# Patient Record
Sex: MALE | Race: BLACK OR AFRICAN AMERICAN | Employment: FULL TIME | URBAN - METROPOLITAN AREA
[De-identification: names, ages, dates, MRNs, and addresses within clinical notes are randomized per-mention and may not be internally consistent; named-entity substitution may affect disease eponyms.]

---

## 2018-04-30 ENCOUNTER — HOSPITAL ENCOUNTER (EMERGENCY)
Age: 33
Discharge: HOME | End: 2018-04-30
Attending: EMERGENCY MEDICINE

## 2018-04-30 VITALS
HEIGHT: 71 IN | TEMPERATURE: 98.7 F | HEART RATE: 88 BPM | SYSTOLIC BLOOD PRESSURE: 129 MMHG | DIASTOLIC BLOOD PRESSURE: 61 MMHG | OXYGEN SATURATION: 98 % | BODY MASS INDEX: 20.3 KG/M2 | WEIGHT: 145 LBS | RESPIRATION RATE: 16 BRPM

## 2018-04-30 DIAGNOSIS — S39.012A STRAIN OF LUMBAR REGION, INITIAL ENCOUNTER: Primary | ICD-10-CM

## 2018-04-30 LAB
BILIRUBIN URINE: NEGATIVE
BLOOD, URINE: NEGATIVE
CLARITY: CLEAR
COLOR: YELLOW
GLUCOSE URINE: NEGATIVE MG/DL
KETONES, URINE: NEGATIVE MG/DL
LEUKOCYTE ESTERASE, URINE: NEGATIVE
NITRITE, URINE: NEGATIVE
PH UA: 6 (ref 5–9)
PROTEIN UA: NEGATIVE MG/DL
SPECIFIC GRAVITY UA: >=1.03 (ref 1–1.03)
UROBILINOGEN, URINE: 0.2 E.U./DL

## 2018-04-30 PROCEDURE — 96372 THER/PROPH/DIAG INJ SC/IM: CPT

## 2018-04-30 PROCEDURE — 81003 URINALYSIS AUTO W/O SCOPE: CPT

## 2018-04-30 PROCEDURE — 6370000000 HC RX 637 (ALT 250 FOR IP): Performed by: EMERGENCY MEDICINE

## 2018-04-30 PROCEDURE — 6360000002 HC RX W HCPCS: Performed by: EMERGENCY MEDICINE

## 2018-04-30 PROCEDURE — 99284 EMERGENCY DEPT VISIT MOD MDM: CPT

## 2018-04-30 RX ORDER — DEXAMETHASONE SODIUM PHOSPHATE 10 MG/ML
10 INJECTION INTRAMUSCULAR; INTRAVENOUS ONCE
Status: COMPLETED | OUTPATIENT
Start: 2018-04-30 | End: 2018-04-30

## 2018-04-30 RX ORDER — CYCLOBENZAPRINE HCL 10 MG
10 TABLET ORAL 2 TIMES DAILY PRN
Qty: 20 TABLET | Refills: 0 | Status: SHIPPED | OUTPATIENT
Start: 2018-04-30 | End: 2022-08-17 | Stop reason: ALTCHOICE

## 2018-04-30 RX ORDER — CYCLOBENZAPRINE HCL 10 MG
10 TABLET ORAL ONCE
Status: COMPLETED | OUTPATIENT
Start: 2018-04-30 | End: 2018-04-30

## 2018-04-30 RX ORDER — KETOROLAC TROMETHAMINE 30 MG/ML
30 INJECTION, SOLUTION INTRAMUSCULAR; INTRAVENOUS ONCE
Status: COMPLETED | OUTPATIENT
Start: 2018-04-30 | End: 2018-04-30

## 2018-04-30 RX ORDER — PREDNISONE 10 MG/1
40 TABLET ORAL DAILY
Qty: 16 TABLET | Refills: 0 | Status: SHIPPED | OUTPATIENT
Start: 2018-04-30 | End: 2018-05-04

## 2018-04-30 RX ADMIN — CYCLOBENZAPRINE HYDROCHLORIDE 10 MG: 10 TABLET, FILM COATED ORAL at 21:37

## 2018-04-30 RX ADMIN — KETOROLAC TROMETHAMINE 30 MG: 30 INJECTION, SOLUTION INTRAMUSCULAR at 21:36

## 2018-04-30 RX ADMIN — DEXAMETHASONE SODIUM PHOSPHATE 10 MG: 10 INJECTION INTRAMUSCULAR; INTRAVENOUS at 21:36

## 2018-04-30 ASSESSMENT — ENCOUNTER SYMPTOMS
NAUSEA: 0
EYE DISCHARGE: 0
DIARRHEA: 0
VOMITING: 0
BACK PAIN: 1
WHEEZING: 0
SHORTNESS OF BREATH: 0
SORE THROAT: 0
SINUS PRESSURE: 0
COUGH: 0
ABDOMINAL PAIN: 0
EYE REDNESS: 0
EYE PAIN: 0

## 2018-04-30 ASSESSMENT — PAIN SCALES - GENERAL
PAINLEVEL_OUTOF10: 7
PAINLEVEL_OUTOF10: 7

## 2018-04-30 ASSESSMENT — PAIN DESCRIPTION - LOCATION: LOCATION: FLANK

## 2018-04-30 ASSESSMENT — PAIN DESCRIPTION - PAIN TYPE: TYPE: ACUTE PAIN

## 2019-09-12 ENCOUNTER — HOSPITAL ENCOUNTER (EMERGENCY)
Age: 34
Discharge: HOME | End: 2019-09-12

## 2019-09-12 VITALS
OXYGEN SATURATION: 95 % | SYSTOLIC BLOOD PRESSURE: 114 MMHG | RESPIRATION RATE: 18 BRPM | TEMPERATURE: 97.9 F | HEART RATE: 61 BPM | DIASTOLIC BLOOD PRESSURE: 69 MMHG

## 2019-09-12 DIAGNOSIS — K08.89 PAIN, DENTAL: Primary | ICD-10-CM

## 2019-09-12 DIAGNOSIS — K04.7 DENTAL INFECTION: ICD-10-CM

## 2019-09-12 PROCEDURE — 6370000000 HC RX 637 (ALT 250 FOR IP): Performed by: NURSE PRACTITIONER

## 2019-09-12 PROCEDURE — 99282 EMERGENCY DEPT VISIT SF MDM: CPT

## 2019-09-12 RX ORDER — AMOXICILLIN 250 MG/1
500 CAPSULE ORAL ONCE
Status: COMPLETED | OUTPATIENT
Start: 2019-09-12 | End: 2019-09-12

## 2019-09-12 RX ORDER — ACETAMINOPHEN 500 MG
1000 TABLET ORAL 4 TIMES DAILY PRN
Qty: 40 TABLET | Refills: 0 | Status: SHIPPED | OUTPATIENT
Start: 2019-09-12 | End: 2022-08-17 | Stop reason: ALTCHOICE

## 2019-09-12 RX ORDER — IBUPROFEN 800 MG/1
800 TABLET ORAL EVERY 6 HOURS PRN
Qty: 40 TABLET | Refills: 0 | Status: SHIPPED | OUTPATIENT
Start: 2019-09-12 | End: 2022-08-17 | Stop reason: ALTCHOICE

## 2019-09-12 RX ORDER — ACETAMINOPHEN 500 MG
1000 TABLET ORAL ONCE
Status: COMPLETED | OUTPATIENT
Start: 2019-09-12 | End: 2019-09-12

## 2019-09-12 RX ORDER — IBUPROFEN 800 MG/1
800 TABLET ORAL ONCE
Status: COMPLETED | OUTPATIENT
Start: 2019-09-12 | End: 2019-09-12

## 2019-09-12 RX ORDER — AMOXICILLIN 500 MG/1
500 CAPSULE ORAL 3 TIMES DAILY
Qty: 30 CAPSULE | Refills: 0 | Status: SHIPPED | OUTPATIENT
Start: 2019-09-12 | End: 2019-09-22

## 2019-09-12 RX ADMIN — AMOXICILLIN 500 MG: 250 CAPSULE ORAL at 21:57

## 2019-09-12 RX ADMIN — IBUPROFEN 800 MG: 800 TABLET, FILM COATED ORAL at 21:57

## 2019-09-12 RX ADMIN — ACETAMINOPHEN 1000 MG: 500 TABLET ORAL at 21:57

## 2019-09-12 ASSESSMENT — PAIN DESCRIPTION - LOCATION: LOCATION: TEETH

## 2019-09-12 ASSESSMENT — PAIN DESCRIPTION - PAIN TYPE: TYPE: ACUTE PAIN

## 2019-09-12 ASSESSMENT — PAIN SCALES - GENERAL: PAINLEVEL_OUTOF10: 10

## 2019-09-12 NOTE — LETTER
[facility] Emergency Department  [address]  [place] [address]  Phone: [phone]             September 12, 2019    Patient: Susi Cazares   YOB: 1985   Date of Visit: 9/12/2019       To Whom It May Concern:    Susi Cazares was seen and treated in our emergency department on 9/12/2019.  He may return to Work/School on the following date 9/14/2019      Sincerely,       Merle Wellington RN         Signature:__________________________________

## 2019-09-13 NOTE — ED PROVIDER NOTES
Clear to auscultation and breath sounds equal.    · CV: Regular rate and rhythm, normal heart sounds, without pathological murmurs, ectopy, gallops, or rubs. · Skin:  No rashes, erythema or lesions present, unless noted elsewhere. .  · Lymphatics: No lymphangitis or adenopathy noted. · Neurological:  Oriented. Motor functions intact. Lab / Imaging Results   (All laboratory and radiology results have been personally reviewed by myself)  Labs:  No results found for this visit on 09/12/19. Imaging: All Radiology results interpreted by Radiologist unless otherwise noted. No orders to display     ED Course / Medical Decision Making     Medications   ibuprofen (ADVIL;MOTRIN) tablet 800 mg (800 mg Oral Given 9/12/19 2157)   acetaminophen (TYLENOL) tablet 1,000 mg (1,000 mg Oral Given 9/12/19 2157)   amoxicillin (AMOXIL) capsule 500 mg (500 mg Oral Given 9/12/19 2157)        Consult(s):   Dental Resident was not consulted to see patient regarding complaint. Procedure(s):    none. Counseling/MDM:    The emergency provider has spoken with the patient and discussed todays results, in addition to providing specific details for the plan of care and counseling regarding the diagnosis and prognosis. Questions are answered at this time and they are agreeable with the plan. Assessment      1. Pain, dental    2.  Dental infection      Plan   Discharge to home  Patient condition is stable    New Medications     Discharge Medication List as of 9/12/2019  9:23 PM      START taking these medications    Details   acetaminophen (TYLENOL) 500 MG tablet Take 2 tablets by mouth 4 times daily as needed for Pain, Disp-40 tablet, R-0Print      ibuprofen (ADVIL;MOTRIN) 800 MG tablet Take 1 tablet by mouth every 6 hours as needed for Pain, Disp-40 tablet, R-0Print      Magic Mouthwash (MIRACLE MOUTHWASH) Swish and spit 5 mLs 4 times daily as needed for Irritation, Disp-240 mL, R-0Benadryl/nystatin/lidocaine  1:1:1Print amoxicillin (AMOXIL) 500 MG capsule Take 1 capsule by mouth 3 times daily for 10 days, Disp-30 capsule, R-0Print           Electronically signed by KARY Shetty CNP   DD: 9/12/19  **This report was transcribed using voice recognition software. Every effort was made to ensure accuracy; however, inadvertent computerized transcription errors may be present.   END OF ED PROVIDER NOTE     KARY Shetty CNP  09/13/19 0321

## 2022-08-17 ENCOUNTER — APPOINTMENT (OUTPATIENT)
Dept: CT IMAGING | Age: 37
DRG: 351 | End: 2022-08-17
Payer: MEDICAID

## 2022-08-17 ENCOUNTER — APPOINTMENT (OUTPATIENT)
Dept: GENERAL RADIOLOGY | Age: 37
DRG: 351 | End: 2022-08-17
Payer: MEDICAID

## 2022-08-17 ENCOUNTER — HOSPITAL ENCOUNTER (INPATIENT)
Age: 37
LOS: 16 days | Discharge: HOME | DRG: 351 | End: 2022-09-02
Attending: EMERGENCY MEDICINE | Admitting: STUDENT IN AN ORGANIZED HEALTH CARE EDUCATION/TRAINING PROGRAM
Payer: MEDICAID

## 2022-08-17 ENCOUNTER — APPOINTMENT (OUTPATIENT)
Dept: ULTRASOUND IMAGING | Age: 37
DRG: 351 | End: 2022-08-17
Payer: MEDICAID

## 2022-08-17 DIAGNOSIS — T80.1XXA IV INFILTRATE, INITIAL ENCOUNTER: ICD-10-CM

## 2022-08-17 DIAGNOSIS — E87.5 HYPERKALEMIA: ICD-10-CM

## 2022-08-17 DIAGNOSIS — N17.9 ACUTE KIDNEY INJURY (HCC): Primary | ICD-10-CM

## 2022-08-17 DIAGNOSIS — Z49.01 ENCOUNTER FOR DIALYSIS CATHETER CARE (HCC): ICD-10-CM

## 2022-08-17 DIAGNOSIS — D72.829 LEUKOCYTOSIS, UNSPECIFIED TYPE: ICD-10-CM
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
## 2022-08-17 DIAGNOSIS — T79.6XXA TRAUMATIC RHABDOMYOLYSIS, INITIAL ENCOUNTER (HCC): ICD-10-CM

## 2022-08-17 DIAGNOSIS — K72.00 SHOCK LIVER: ICD-10-CM

## 2022-08-17 LAB
ACETAMINOPHEN LEVEL: <5 MCG/ML (ref 10–30)
ACETAMINOPHEN LEVEL: <5 MCG/ML (ref 10–30)
ALBUMIN SERPL-MCNC: 3.1 G/DL (ref 3.5–5.2)
ALBUMIN SERPL-MCNC: 3.9 G/DL (ref 3.5–5.2)
ALP BLD-CCNC: 76 U/L (ref 40–129)
ALP BLD-CCNC: 90 U/L (ref 40–129)
ALT SERPL-CCNC: 1005 U/L (ref 0–40)
ALT SERPL-CCNC: 903 U/L (ref 0–40)
AMPHETAMINE SCREEN, URINE: NOT DETECTED
ANION GAP SERPL CALCULATED.3IONS-SCNC: 14 MMOL/L (ref 7–16)
ANION GAP SERPL CALCULATED.3IONS-SCNC: 16 MMOL/L (ref 7–16)
ANION GAP SERPL CALCULATED.3IONS-SCNC: 19 MMOL/L (ref 7–16)
AST SERPL-CCNC: 3096 U/L (ref 0–39)
AST SERPL-CCNC: 3665 U/L (ref 0–39)
BACTERIA: ABNORMAL /HPF
BARBITURATE SCREEN URINE: NOT DETECTED
BASOPHILS ABSOLUTE: 0.03 E9/L (ref 0–0.2)
BASOPHILS RELATIVE PERCENT: 0.2 % (ref 0–2)
BENZODIAZEPINE SCREEN, URINE: NOT DETECTED
BILIRUB SERPL-MCNC: 0.4 MG/DL (ref 0–1.2)
BILIRUB SERPL-MCNC: 0.4 MG/DL (ref 0–1.2)
BILIRUBIN DIRECT: <0.2 MG/DL (ref 0–0.3)
BILIRUBIN URINE: ABNORMAL
BILIRUBIN, INDIRECT: ABNORMAL MG/DL (ref 0–1)
BLOOD, URINE: ABNORMAL
BUN BLDV-MCNC: 32 MG/DL (ref 6–20)
BUN BLDV-MCNC: 35 MG/DL (ref 6–20)
BUN BLDV-MCNC: 40 MG/DL (ref 6–20)
CALCIUM SERPL-MCNC: 6.8 MG/DL (ref 8.6–10.2)
CALCIUM SERPL-MCNC: 6.8 MG/DL (ref 8.6–10.2)
CALCIUM SERPL-MCNC: 7.3 MG/DL (ref 8.6–10.2)
CANNABINOID SCREEN URINE: NOT DETECTED
CHLORIDE BLD-SCNC: 90 MMOL/L (ref 98–107)
CHLORIDE BLD-SCNC: 94 MMOL/L (ref 98–107)
CHLORIDE BLD-SCNC: 97 MMOL/L (ref 98–107)
CHLORIDE URINE RANDOM: 53 MMOL/L
CHP ED QC CHECK: NORMAL
CLARITY: ABNORMAL
CO2: 18 MMOL/L (ref 22–29)
CO2: 20 MMOL/L (ref 22–29)
CO2: 26 MMOL/L (ref 22–29)
COCAINE METABOLITE SCREEN URINE: POSITIVE
COLOR: ABNORMAL
CREAT SERPL-MCNC: 4.3 MG/DL (ref 0.7–1.2)
CREAT SERPL-MCNC: 4.4 MG/DL (ref 0.7–1.2)
CREAT SERPL-MCNC: 5 MG/DL (ref 0.7–1.2)
CREATININE URINE: 172 MG/DL (ref 40–278)
EKG ATRIAL RATE: 82 BPM
EKG P AXIS: 77 DEGREES
EKG P-R INTERVAL: 136 MS
EKG Q-T INTERVAL: 348 MS
EKG QRS DURATION: 88 MS
EKG QTC CALCULATION (BAZETT): 406 MS
EKG R AXIS: 54 DEGREES
EKG T AXIS: 52 DEGREES
EKG VENTRICULAR RATE: 82 BPM
EOSINOPHILS ABSOLUTE: 0.02 E9/L (ref 0.05–0.5)
EOSINOPHILS RELATIVE PERCENT: 0.1 % (ref 0–6)
ETHANOL: <10 MG/DL (ref 0–0.08)
FENTANYL SCREEN, URINE: POSITIVE
GFR AFRICAN AMERICAN: 16
GFR AFRICAN AMERICAN: 18
GFR AFRICAN AMERICAN: 19
GFR NON-AFRICAN AMERICAN: 16 ML/MIN/1.73
GFR NON-AFRICAN AMERICAN: 18 ML/MIN/1.73
GFR NON-AFRICAN AMERICAN: 19 ML/MIN/1.73
GLUCOSE BLD-MCNC: 124 MG/DL
GLUCOSE BLD-MCNC: 128 MG/DL (ref 74–99)
GLUCOSE BLD-MCNC: 146 MG/DL (ref 74–99)
GLUCOSE BLD-MCNC: 172 MG/DL
GLUCOSE BLD-MCNC: 180 MG/DL
GLUCOSE BLD-MCNC: 183 MG/DL (ref 74–99)
GLUCOSE BLD-MCNC: 187 MG/DL
GLUCOSE BLD-MCNC: 94 MG/DL
GLUCOSE URINE: 100 MG/DL
HCT VFR BLD CALC: 59.5 % (ref 37–54)
HEMOGLOBIN: 19.8 G/DL (ref 12.5–16.5)
IMMATURE GRANULOCYTES #: 0.1 E9/L
IMMATURE GRANULOCYTES %: 0.6 % (ref 0–5)
KETONES, URINE: ABNORMAL MG/DL
LACTIC ACID: 3.9 MMOL/L (ref 0.5–2.2)
LEUKOCYTE ESTERASE, URINE: ABNORMAL
LYMPHOCYTES ABSOLUTE: 1.39 E9/L (ref 1.5–4)
LYMPHOCYTES RELATIVE PERCENT: 8.2 % (ref 20–42)
Lab: ABNORMAL
MAGNESIUM: 2.3 MG/DL (ref 1.6–2.6)
MCH RBC QN AUTO: 29.8 PG (ref 26–35)
MCHC RBC AUTO-ENTMCNC: 33.3 % (ref 32–34.5)
MCV RBC AUTO: 89.5 FL (ref 80–99.9)
METER GLUCOSE: 124 MG/DL (ref 74–99)
METER GLUCOSE: 142 MG/DL (ref 74–99)
METER GLUCOSE: 172 MG/DL (ref 74–99)
METER GLUCOSE: 176 MG/DL (ref 74–99)
METER GLUCOSE: 180 MG/DL (ref 74–99)
METER GLUCOSE: 187 MG/DL (ref 74–99)
METER GLUCOSE: 199 MG/DL (ref 74–99)
METER GLUCOSE: 430 MG/DL (ref 74–99)
METER GLUCOSE: 94 MG/DL (ref 74–99)
METHADONE SCREEN, URINE: NOT DETECTED
MONOCYTES ABSOLUTE: 1.31 E9/L (ref 0.1–0.95)
MONOCYTES RELATIVE PERCENT: 7.7 % (ref 2–12)
NEUTROPHILS ABSOLUTE: 14.11 E9/L (ref 1.8–7.3)
NEUTROPHILS RELATIVE PERCENT: 83.2 % (ref 43–80)
NITRITE, URINE: POSITIVE
OPIATE SCREEN URINE: NOT DETECTED
OSMOLALITY URINE: 406 MOSM/KG (ref 300–900)
OXYCODONE URINE: NOT DETECTED
PDW BLD-RTO: 16 FL (ref 11.5–15)
PH UA: 6.5 (ref 5–9)
PHENCYCLIDINE SCREEN URINE: NOT DETECTED
PLATELET # BLD: 423 E9/L (ref 130–450)
PMV BLD AUTO: 9.4 FL (ref 7–12)
POTASSIUM REFLEX MAGNESIUM: 6.5 MMOL/L (ref 3.5–5)
POTASSIUM REFLEX MAGNESIUM: 8.3 MMOL/L (ref 3.5–5)
POTASSIUM SERPL-SCNC: 5.2 MMOL/L (ref 3.5–5)
POTASSIUM SERPL-SCNC: 5.6 MMOL/L (ref 3.5–5)
POTASSIUM, UR: 61.6 MMOL/L
PROTEIN PROTEIN: 346 MG/DL (ref 0–12)
PROTEIN UA: >=300 MG/DL
PROTEIN/CREAT RATIO: 2
PROTEIN/CREAT RATIO: 2 (ref 0–0.2)
RBC # BLD: 6.65 E12/L (ref 3.8–5.8)
RBC UA: ABNORMAL /HPF (ref 0–2)
SALICYLATE, SERUM: 0.5 MG/DL (ref 0–30)
SODIUM BLD-SCNC: 130 MMOL/L (ref 132–146)
SODIUM BLD-SCNC: 131 MMOL/L (ref 132–146)
SODIUM BLD-SCNC: 133 MMOL/L (ref 132–146)
SODIUM URINE: 82 MMOL/L
SPECIFIC GRAVITY UA: 1.02 (ref 1–1.03)
TOTAL PROTEIN: 6.3 G/DL (ref 6.4–8.3)
TOTAL PROTEIN: 7.8 G/DL (ref 6.4–8.3)
TRICYCLIC ANTIDEPRESSANTS SCREEN SERUM: NEGATIVE NG/ML
UROBILINOGEN, URINE: 2 E.U./DL
WBC # BLD: 17 E9/L (ref 4.5–11.5)
WBC UA: ABNORMAL /HPF (ref 0–5)

## 2022-08-17 PROCEDURE — 96374 THER/PROPH/DIAG INJ IV PUSH: CPT

## 2022-08-17 PROCEDURE — 2580000003 HC RX 258: Performed by: NURSE PRACTITIONER

## 2022-08-17 PROCEDURE — 93005 ELECTROCARDIOGRAM TRACING: CPT

## 2022-08-17 PROCEDURE — 6370000000 HC RX 637 (ALT 250 FOR IP): Performed by: EMERGENCY MEDICINE

## 2022-08-17 PROCEDURE — 84133 ASSAY OF URINE POTASSIUM: CPT

## 2022-08-17 PROCEDURE — 2580000003 HC RX 258: Performed by: EMERGENCY MEDICINE

## 2022-08-17 PROCEDURE — 73060 X-RAY EXAM OF HUMERUS: CPT

## 2022-08-17 PROCEDURE — 6360000002 HC RX W HCPCS: Performed by: INTERNAL MEDICINE

## 2022-08-17 PROCEDURE — 36415 COLL VENOUS BLD VENIPUNCTURE: CPT

## 2022-08-17 PROCEDURE — 99285 EMERGENCY DEPT VISIT HI MDM: CPT

## 2022-08-17 PROCEDURE — 6360000002 HC RX W HCPCS: Performed by: STUDENT IN AN ORGANIZED HEALTH CARE EDUCATION/TRAINING PROGRAM

## 2022-08-17 PROCEDURE — 94640 AIRWAY INHALATION TREATMENT: CPT

## 2022-08-17 PROCEDURE — 84300 ASSAY OF URINE SODIUM: CPT

## 2022-08-17 PROCEDURE — 85025 COMPLETE CBC W/AUTO DIFF WBC: CPT

## 2022-08-17 PROCEDURE — 2500000003 HC RX 250 WO HCPCS: Performed by: INTERNAL MEDICINE

## 2022-08-17 PROCEDURE — 6360000002 HC RX W HCPCS

## 2022-08-17 PROCEDURE — 80179 DRUG ASSAY SALICYLATE: CPT

## 2022-08-17 PROCEDURE — 80053 COMPREHEN METABOLIC PANEL: CPT

## 2022-08-17 PROCEDURE — 2580000003 HC RX 258

## 2022-08-17 PROCEDURE — 96361 HYDRATE IV INFUSION ADD-ON: CPT

## 2022-08-17 PROCEDURE — 76705 ECHO EXAM OF ABDOMEN: CPT

## 2022-08-17 PROCEDURE — 76770 US EXAM ABDO BACK WALL COMP: CPT

## 2022-08-17 PROCEDURE — 82436 ASSAY OF URINE CHLORIDE: CPT

## 2022-08-17 PROCEDURE — 83735 ASSAY OF MAGNESIUM: CPT

## 2022-08-17 PROCEDURE — 2500000003 HC RX 250 WO HCPCS: Performed by: EMERGENCY MEDICINE

## 2022-08-17 PROCEDURE — 2580000003 HC RX 258: Performed by: INTERNAL MEDICINE

## 2022-08-17 PROCEDURE — 83935 ASSAY OF URINE OSMOLALITY: CPT

## 2022-08-17 PROCEDURE — 2060000000 HC ICU INTERMEDIATE R&B

## 2022-08-17 PROCEDURE — 99254 IP/OBS CNSLTJ NEW/EST MOD 60: CPT | Performed by: ORTHOPAEDIC SURGERY

## 2022-08-17 PROCEDURE — 6370000000 HC RX 637 (ALT 250 FOR IP): Performed by: INTERNAL MEDICINE

## 2022-08-17 PROCEDURE — 96375 TX/PRO/DX INJ NEW DRUG ADDON: CPT

## 2022-08-17 PROCEDURE — 80143 DRUG ASSAY ACETAMINOPHEN: CPT

## 2022-08-17 PROCEDURE — 72125 CT NECK SPINE W/O DYE: CPT

## 2022-08-17 PROCEDURE — 80307 DRUG TEST PRSMV CHEM ANLYZR: CPT

## 2022-08-17 PROCEDURE — 73080 X-RAY EXAM OF ELBOW: CPT

## 2022-08-17 PROCEDURE — 84156 ASSAY OF PROTEIN URINE: CPT

## 2022-08-17 PROCEDURE — 6370000000 HC RX 637 (ALT 250 FOR IP): Performed by: NURSE PRACTITIONER

## 2022-08-17 PROCEDURE — 73090 X-RAY EXAM OF FOREARM: CPT

## 2022-08-17 PROCEDURE — 81001 URINALYSIS AUTO W/SCOPE: CPT

## 2022-08-17 PROCEDURE — 82077 ASSAY SPEC XCP UR&BREATH IA: CPT

## 2022-08-17 PROCEDURE — 82962 GLUCOSE BLOOD TEST: CPT

## 2022-08-17 PROCEDURE — 82550 ASSAY OF CK (CPK): CPT

## 2022-08-17 PROCEDURE — 99223 1ST HOSP IP/OBS HIGH 75: CPT | Performed by: STUDENT IN AN ORGANIZED HEALTH CARE EDUCATION/TRAINING PROGRAM

## 2022-08-17 PROCEDURE — 6370000000 HC RX 637 (ALT 250 FOR IP)

## 2022-08-17 PROCEDURE — 83605 ASSAY OF LACTIC ACID: CPT

## 2022-08-17 PROCEDURE — 82570 ASSAY OF URINE CREATININE: CPT

## 2022-08-17 PROCEDURE — 70450 CT HEAD/BRAIN W/O DYE: CPT

## 2022-08-17 PROCEDURE — 80048 BASIC METABOLIC PNL TOTAL CA: CPT

## 2022-08-17 PROCEDURE — 80076 HEPATIC FUNCTION PANEL: CPT

## 2022-08-17 PROCEDURE — 94664 DEMO&/EVAL PT USE INHALER: CPT

## 2022-08-17 PROCEDURE — 72170 X-RAY EXAM OF PELVIS: CPT

## 2022-08-17 PROCEDURE — 73030 X-RAY EXAM OF SHOULDER: CPT

## 2022-08-17 PROCEDURE — 84132 ASSAY OF SERUM POTASSIUM: CPT

## 2022-08-17 RX ORDER — DEXTROSE MONOHYDRATE 25 G/50ML
25 INJECTION, SOLUTION INTRAVENOUS ONCE
Status: COMPLETED | OUTPATIENT
Start: 2022-08-17 | End: 2022-08-17

## 2022-08-17 RX ORDER — POLYETHYLENE GLYCOL 3350 17 G/17G
17 POWDER, FOR SOLUTION ORAL DAILY PRN
Status: DISCONTINUED | OUTPATIENT
Start: 2022-08-17 | End: 2022-09-02 | Stop reason: HOSPADM

## 2022-08-17 RX ORDER — ACETAMINOPHEN 650 MG/1
650 SUPPOSITORY RECTAL EVERY 6 HOURS PRN
Status: CANCELLED | OUTPATIENT
Start: 2022-08-17

## 2022-08-17 RX ORDER — ENOXAPARIN SODIUM 100 MG/ML
30 INJECTION SUBCUTANEOUS DAILY
Status: CANCELLED | OUTPATIENT
Start: 2022-08-17

## 2022-08-17 RX ORDER — MORPHINE SULFATE 2 MG/ML
1 INJECTION, SOLUTION INTRAMUSCULAR; INTRAVENOUS EVERY 6 HOURS PRN
Status: DISCONTINUED | OUTPATIENT
Start: 2022-08-17 | End: 2022-08-18

## 2022-08-17 RX ORDER — ACETAMINOPHEN 325 MG/1
650 TABLET ORAL EVERY 6 HOURS PRN
Status: CANCELLED | OUTPATIENT
Start: 2022-08-17

## 2022-08-17 RX ORDER — SODIUM CHLORIDE 0.9 % (FLUSH) 0.9 %
5-40 SYRINGE (ML) INJECTION EVERY 12 HOURS SCHEDULED
Status: DISCONTINUED | OUTPATIENT
Start: 2022-08-17 | End: 2022-09-02 | Stop reason: HOSPADM

## 2022-08-17 RX ORDER — SODIUM CHLORIDE 9 MG/ML
1000 INJECTION, SOLUTION INTRAVENOUS CONTINUOUS
Status: DISCONTINUED | OUTPATIENT
Start: 2022-08-17 | End: 2022-08-17

## 2022-08-17 RX ORDER — DEXTROSE MONOHYDRATE 100 MG/ML
INJECTION, SOLUTION INTRAVENOUS CONTINUOUS PRN
Status: DISCONTINUED | OUTPATIENT
Start: 2022-08-17 | End: 2022-09-02 | Stop reason: HOSPADM

## 2022-08-17 RX ORDER — 0.9 % SODIUM CHLORIDE 0.9 %
1000 INTRAVENOUS SOLUTION INTRAVENOUS ONCE
Status: COMPLETED | OUTPATIENT
Start: 2022-08-17 | End: 2022-08-17

## 2022-08-17 RX ORDER — SODIUM CHLORIDE 9 MG/ML
INJECTION, SOLUTION INTRAVENOUS CONTINUOUS
Status: DISCONTINUED | OUTPATIENT
Start: 2022-08-17 | End: 2022-08-26

## 2022-08-17 RX ORDER — SODIUM CHLORIDE 0.9 % (FLUSH) 0.9 %
5-40 SYRINGE (ML) INJECTION PRN
Status: DISCONTINUED | OUTPATIENT
Start: 2022-08-17 | End: 2022-09-02 | Stop reason: HOSPADM

## 2022-08-17 RX ORDER — SODIUM CHLORIDE 9 MG/ML
INJECTION, SOLUTION INTRAVENOUS PRN
Status: DISCONTINUED | OUTPATIENT
Start: 2022-08-17 | End: 2022-09-02 | Stop reason: HOSPADM

## 2022-08-17 RX ORDER — CALCIUM GLUCONATE 94 MG/ML
1000 INJECTION, SOLUTION INTRAVENOUS ONCE
Status: COMPLETED | OUTPATIENT
Start: 2022-08-17 | End: 2022-08-17

## 2022-08-17 RX ADMIN — DEXTROSE MONOHYDRATE 25 G: 25 INJECTION, SOLUTION INTRAVENOUS at 18:40

## 2022-08-17 RX ADMIN — SODIUM BICARBONATE 50 MEQ: 84 INJECTION, SOLUTION INTRAVENOUS at 18:39

## 2022-08-17 RX ADMIN — CALCIUM GLUCONATE 1000 MG: 98 INJECTION, SOLUTION INTRAVENOUS at 11:46

## 2022-08-17 RX ADMIN — SODIUM ZIRCONIUM CYCLOSILICATE 10 G: 10 POWDER, FOR SUSPENSION ORAL at 20:00

## 2022-08-17 RX ADMIN — INSULIN HUMAN 10 UNITS: 100 INJECTION, SOLUTION PARENTERAL at 18:45

## 2022-08-17 RX ADMIN — SODIUM BICARBONATE: 84 INJECTION, SOLUTION INTRAVENOUS at 16:47

## 2022-08-17 RX ADMIN — SODIUM CHLORIDE 1000 ML: 9 INJECTION, SOLUTION INTRAVENOUS at 11:08

## 2022-08-17 RX ADMIN — Medication 10 ML: at 19:59

## 2022-08-17 RX ADMIN — DEXTROSE MONOHYDRATE 250 ML: 100 INJECTION, SOLUTION INTRAVENOUS at 11:45

## 2022-08-17 RX ADMIN — SODIUM ZIRCONIUM CYCLOSILICATE 10 G: 10 POWDER, FOR SUSPENSION ORAL at 13:00

## 2022-08-17 RX ADMIN — INSULIN HUMAN 10 UNITS: 100 INJECTION, SOLUTION PARENTERAL at 11:49

## 2022-08-17 RX ADMIN — ALBUTEROL SULFATE 10 MG: 2.5 SOLUTION RESPIRATORY (INHALATION) at 12:34

## 2022-08-17 RX ADMIN — CALCIUM GLUCONATE 1000 MG: 98 INJECTION, SOLUTION INTRAVENOUS at 18:51

## 2022-08-17 RX ADMIN — SODIUM CHLORIDE: 9 INJECTION, SOLUTION INTRAVENOUS at 22:04

## 2022-08-17 RX ADMIN — SODIUM BICARBONATE: 84 INJECTION, SOLUTION INTRAVENOUS at 12:16

## 2022-08-17 RX ADMIN — MORPHINE SULFATE 1 MG: 2 INJECTION, SOLUTION INTRAMUSCULAR; INTRAVENOUS at 20:10

## 2022-08-17 ASSESSMENT — ENCOUNTER SYMPTOMS
WHEEZING: 0
SHORTNESS OF BREATH: 0
BLOOD IN STOOL: 0
EYE DISCHARGE: 0
COUGH: 0
ABDOMINAL PAIN: 0
EYE REDNESS: 0
PHOTOPHOBIA: 0
DIARRHEA: 0
CONSTIPATION: 0
ABDOMINAL DISTENTION: 0
SORE THROAT: 0
SINUS PRESSURE: 0
BACK PAIN: 0
TROUBLE SWALLOWING: 0
VOMITING: 0
NAUSEA: 0

## 2022-08-17 ASSESSMENT — PAIN DESCRIPTION - ORIENTATION
ORIENTATION: LEFT
ORIENTATION: RIGHT
ORIENTATION: RIGHT

## 2022-08-17 ASSESSMENT — LIFESTYLE VARIABLES
HOW MANY STANDARD DRINKS CONTAINING ALCOHOL DO YOU HAVE ON A TYPICAL DAY: PATIENT DOES NOT DRINK
HOW OFTEN DO YOU HAVE A DRINK CONTAINING ALCOHOL: NEVER

## 2022-08-17 ASSESSMENT — PAIN - FUNCTIONAL ASSESSMENT
PAIN_FUNCTIONAL_ASSESSMENT: PREVENTS OR INTERFERES WITH MANY ACTIVE NOT PASSIVE ACTIVITIES
PAIN_FUNCTIONAL_ASSESSMENT: PREVENTS OR INTERFERES SOME ACTIVE ACTIVITIES AND ADLS
PAIN_FUNCTIONAL_ASSESSMENT: PREVENTS OR INTERFERES WITH MANY ACTIVE NOT PASSIVE ACTIVITIES

## 2022-08-17 ASSESSMENT — PAIN DESCRIPTION - LOCATION
LOCATION: LEG;SHOULDER
LOCATION: HIP;LEG
LOCATION: HIP;LEG

## 2022-08-17 ASSESSMENT — PAIN DESCRIPTION - PAIN TYPE
TYPE: ACUTE PAIN

## 2022-08-17 ASSESSMENT — PAIN SCALES - GENERAL
PAINLEVEL_OUTOF10: 10

## 2022-08-17 ASSESSMENT — PAIN DESCRIPTION - FREQUENCY
FREQUENCY: CONTINUOUS

## 2022-08-17 ASSESSMENT — PAIN DESCRIPTION - DESCRIPTORS
DESCRIPTORS: ACHING
DESCRIPTORS: SQUEEZING
DESCRIPTORS: SHARP

## 2022-08-17 ASSESSMENT — PAIN DESCRIPTION - ONSET
ONSET: ON-GOING
ONSET: ON-GOING

## 2022-08-17 NOTE — ED PROVIDER NOTES
[facility]  Department of Emergency Medicine     Written by: Mari Bowman MD  Patient Name: Cheyenne Aguilar  Attending Provider: Urvashi De Luna DO  Admit Date: 2022  9:25 AM  MRN: [id number]                   : 1985        Chief Complaint   Patient presents with    Leg Pain     Left leg numbness. Extremity Weakness     Numbess to left shoulder and hand    Fall     Last night, stated he passed out.     - Chief complaint    The patient is a 38-year-old male with no past medical history presenting with left leg pain, numbness to the left shoulder and hand, and a sustained fall last night. The patient reports \"taking a hit\" of his vape pen after getting home from work, and then immediately passing out. He then wakes up the next morning because his mother finds him on the floor. It was at this time he noted he had left upper and lower extremity soreness, numbness, tingling, and decreased strength. He called an ambulance and was brought to the hospital.  The patient says that his playpen only contains tobacco.  He denies alcohol or drug use. The patient denies headache, fevers, lightheadedness, dizziness, chest pain, shortness of breath, nausea, vomiting, abdominal pain, numbness, weakness, or loss of sensation prior to his fall. The patient also denies sore throat, cough, dysuria, increasing or decreasing urinary frequency, hematuria, blood in stool, constipation, diarrhea, leg swelling, leg pain, recent surgery, recent travel, or sick contacts. Review of Systems   Constitutional:  Negative for activity change, chills, fatigue and fever. HENT:  Negative for congestion, drooling, ear discharge, ear pain, mouth sores, postnasal drip, sinus pressure, sore throat and trouble swallowing. Eyes:  Negative for photophobia, discharge, redness and visual disturbance. Respiratory:  Negative for cough, shortness of breath and wheezing.     Cardiovascular:  Negative for chest pain and leg swelling. Gastrointestinal:  Negative for abdominal distention, abdominal pain, blood in stool, constipation, diarrhea, nausea and vomiting. Endocrine: Negative for cold intolerance and heat intolerance. Genitourinary:  Negative for decreased urine volume, difficulty urinating, dysuria, flank pain, frequency, hematuria and urgency. Musculoskeletal:  Negative for arthralgias, back pain, joint swelling and myalgias. Skin:  Negative for rash and wound. Allergic/Immunologic: Negative for immunocompromised state. Neurological:  Positive for syncope, weakness and numbness. Negative for dizziness, seizures, facial asymmetry, speech difficulty, light-headedness and headaches. Hematological:  Does not bruise/bleed easily. Psychiatric/Behavioral:  Negative for behavioral problems and hallucinations. Physical Exam  Constitutional:       General: He is not in acute distress. Appearance: Normal appearance. He is not toxic-appearing. HENT:      Head: Normocephalic. Abrasion present. No raccoon eyes, Mercer's sign, contusion or masses. Hair is normal.      Jaw: No tenderness, swelling or pain on movement. Right Ear: Hearing and tympanic membrane normal.      Left Ear: Hearing and tympanic membrane normal.      Nose: Nose normal. No nasal deformity or septal deviation. Right Nostril: No epistaxis or septal hematoma. Left Nostril: No epistaxis or septal hematoma. Mouth/Throat:      Lips: Pink. Mouth: Mucous membranes are moist. No injury, lacerations, oral lesions or angioedema. Tongue: No lesions. Pharynx: Oropharynx is clear. Comments: Evidence of tongue biting noted  Eyes:      General: No visual field deficit. Extraocular Movements: Extraocular movements intact. Conjunctiva/sclera: Conjunctivae normal.      Pupils: Pupils are equal, round, and reactive to light. Cardiovascular:      Rate and Rhythm: Normal rate and regular rhythm. Pulses: Normal pulses. Heart sounds: S1 normal and S2 normal.   Pulmonary:      Effort: Pulmonary effort is normal. No respiratory distress. Breath sounds: Normal breath sounds and air entry. No wheezing or rales. Abdominal:      General: Abdomen is flat. Bowel sounds are normal.      Palpations: Abdomen is soft. There is no mass. Tenderness: There is no abdominal tenderness. There is no right CVA tenderness, left CVA tenderness or guarding. Musculoskeletal:         General: No swelling. Right shoulder: Normal.      Left shoulder: Swelling and tenderness present. No deformity, effusion, laceration, bony tenderness or crepitus. Decreased range of motion. Decreased strength. Normal pulse. Right upper arm: Normal.      Left upper arm: No swelling, edema, deformity, lacerations, tenderness or bony tenderness. Right elbow: Normal.      Left elbow: No swelling, deformity, effusion or lacerations. Normal range of motion. No tenderness. Right forearm: Normal.      Left forearm: No swelling, edema, deformity, lacerations, tenderness or bony tenderness. Left wrist: No swelling, deformity, tenderness, bony tenderness, snuff box tenderness or crepitus. Normal range of motion. Normal pulse. Right hand: Normal.      Left hand: Swelling present. No tenderness or bony tenderness. Normal range of motion. Normal strength. Normal sensation. Normal capillary refill. Normal pulse. Cervical back: Full passive range of motion without pain, normal range of motion and neck supple. No swelling, edema, deformity, erythema, signs of trauma, lacerations, rigidity, tenderness, bony tenderness or crepitus. No pain with movement. Normal range of motion. Thoracic back: No swelling, edema, deformity, signs of trauma, lacerations or tenderness. Normal range of motion. Lumbar back: No swelling, edema, deformity, signs of trauma, lacerations, tenderness or bony tenderness.  Normal range of normal.         Behavior: Behavior is cooperative. EKG Interpretation    Interpreted by emergency department physician    Rhythm: normal sinus   Rate: normal  Axis: normal  Ectopy: none  Conduction: normal  ST Segments: nonspecific changes  T Waves: peaked in multiple  Q Waves: none    Clinical Impression: No evidence of acute ischemic injury but peaked T waves visible in multiple leads. Suspicious for hyperkalemia    Vibha Swartz MD    Procedures       MDM  Number of Diagnoses or Management Options  Acute kidney injury [facility])  Fall, initial encounter  Hyperkalemia  Leukocytosis, unspecified type  Shock liver  Traumatic rhabdomyolysis, initial encounter [facility])  Diagnosis management comments: The patient is a 58-year-old male with no past medical history presenting with left leg pain, numbness to the left shoulder and hand, and a sustained fall last night. At the time of examination the patient was vitally stable. EKG as above. CBC showed leukocytosis and polycythemia. CMP showed hyperkalemia at 8.3. It also showed hypochloremia, bicarb of 20, BUN 32, creatinine of 4.3, with a GFR of 19, hypocalcemia, ALT of 1005, and AST of 3665. CK levels were greater than 22,000. Hyperkalemia protocol was started. Patient was also started on 2 times maintenance fluids for rhabdomyolysis. Call out to nephrology was made to evaluate need for emergent hemodialysis. Dr. Ana Colon agreed with hyperkalemia protocol and suggested different maintenance fluids as outlined in the ED course. CT head and cervical spine showed no acute abnormality. Left shoulder and pelvis x-rays showed no acute abnormalities. Patient was reassessed and is resting comfortably. He is still experiencing some discomfort, numbness, and tingling. Serum drug screen is negative for ethanol, acetaminophen and salicylate. Repeat BMP showed reduction of the patient's potassium level to 5.6.   Please run this time that Dr. Varun Monzon, intensivist, was consulted about the need for intensive care admission for this patient. At this time the patient does not require intensive care unit admission as there is no need for emergent or urgent hemodialysis or need for sedation. Dr. Lamont Tyson believes this patient could be managed on an intermediate care with telemetry. Dr. Rhianna Mendez, hospitalist, was consulted for admission and agreed to admit the patient to telemetry intermediate care. She asked that GI be consulted for patient's shock liver. Dr. Macho Mejia, gastroenterologist, recommended further liver labs and right upper quadrant ultrasound and to be reached out to later following results. At this time urine studies returned. Urine drug screen was positive for cocaine and fentanyl. Urinalysis revealed large bilirubin and blood with few RBCs. Patient was educated about his condition and demonstrated good understanding. At time of admission patient questions and was vitally stable. ED Course as of 08/17/22 2057   Wed Aug 17, 2022   1150 Consult to nephrology, spoke with Dr. Bryanna Sheth. Agrees with hyperkalemia protocol and reassessment of potassium, can repeat treatment if potassium is still elevated. Recommended Lokelma 10 g every 8 hours x 3 doses. Recommended D5W with 150 sodium bicarb at 250 cc an hour x2 L. He will follow inpatient course in the ICU, but does not appear to need emergent dialysis from the ED at this time [PB]   1300 Patient reassessed, continues to be vitally stable. Still reporting weakness and pain on the left side of his body. [VG]   1520 Drug Screen Comment[de-identified] see below [VG]   1 Spoke with Dr. Lamont Tyson, intensivist, about potential need for ICU admission for this patient. He suggested that since the patient's potassium level has improved significantly and he is mentating well and not requiring any sedation the patient does not need to be admitted to the unit but can be managed with intermediate care on telemetry.  [VG]   5809 Spoke with Dr. Es Reese who agreed to admit the patient to intermediate care on telemetry. She also asked that gastroenterology be consulted for shock liver in the setting of rhabdomyolysis and hyperkalemia. [VG]   65 Spoke with Dr. Mitra Rogers, Gastroenterologist, who suggested that we collect a PT/INR as well as dedicated hepatic function to assess extent of liver disease. He also suggested a right upper quadrant ultrasound to visualize the liver. He also has to be kept in the loop when these results are back. Report given to [facility] on the fourth floor. [VG]      ED Course User Index  [PB] Sherry Coy,   [VG] Roselia Bobby MD        ----------------------------------------------- PAST HISTORY --------------------------------------------  Past Medical History:  has a past medical history of Femur fracture ([facility] [address] Past Surgical History:  has no past surgical history on file. Social History:  reports that he has been smoking cigarettes. He has been smoking an average of .5 packs per day. He has never used smokeless tobacco. He reports current drug use. He reports that he does not drink alcohol. Family History: family history includes Cancer in his brother; Diabetes in his father and mother; Hypertension in his father and mother. The patients home medications have been reviewed. Allergies: Patient has no known allergies.     ------------------------------------------------ RESULTS ---------------------------------------------------    Labs:  Results for orders placed or performed during the hospital encounter of 08/17/22   CBC with Auto Differential   Result Value Ref Range    WBC 17.0 (H) 4.5 - 11.5 E9/L    RBC 6.65 (H) 3.80 - 5.80 E12/L    Hemoglobin 19.8 (H) 12.5 - 16.5 g/dL    Hematocrit 59.5 (H) 37.0 - 54.0 %    MCV 89.5 80.0 - 99.9 fL    MCH 29.8 26.0 - 35.0 pg    MCHC 33.3 32.0 - 34.5 %    RDW 16.0 (H) 11.5 - 15.0 fL    Platelets 850 384 - 957 E9/L    MPV 9.4 7.0 - 12.0 fL    Neutrophils % 83.2 (H) 43.0 - 80.0 %    Immature Granulocytes % 0.6 0.0 - 5.0 %    Lymphocytes % 8.2 (L) 20.0 - 42.0 %    Monocytes % 7.7 2.0 - 12.0 %    Eosinophils % 0.1 0.0 - 6.0 %    Basophils % 0.2 0.0 - 2.0 %    Neutrophils Absolute 14.11 (H) 1.80 - 7.30 E9/L    Immature Granulocytes # 0.10 E9/L    Lymphocytes Absolute 1.39 (L) 1.50 - 4.00 E9/L    Monocytes Absolute 1.31 (H) 0.10 - 0.95 E9/L    Eosinophils Absolute 0.02 (L) 0.05 - 0.50 E9/L    Basophils Absolute 0.03 0.00 - 0.20 E9/L   Comprehensive Metabolic Panel w/ Reflex to MG   Result Value Ref Range    Sodium 133 132 - 146 mmol/L    Potassium reflex Magnesium 8.3 (HH) 3.5 - 5.0 mmol/L    Chloride 94 (L) 98 - 107 mmol/L    CO2 20 (L) 22 - 29 mmol/L    Anion Gap 19 (H) 7 - 16 mmol/L    Glucose 128 (H) 74 - 99 mg/dL    BUN 32 (H) 6 - 20 mg/dL    Creatinine 4.3 (H) 0.7 - 1.2 mg/dL    GFR Non-African American 19 >=60 mL/min/1.73    GFR African American 19     Calcium 7.3 (L) 8.6 - 10.2 mg/dL    Total Protein 7.8 6.4 - 8.3 g/dL    Albumin 3.9 3.5 - 5.2 g/dL    Total Bilirubin 0.4 0.0 - 1.2 mg/dL    Alkaline Phosphatase 90 40 - 129 U/L    ALT 1,005 (H) 0 - 40 U/L    AST 3,665 (H) 0 - 39 U/L   CK   Result Value Ref Range    Total CK >22,000 (H) 20 - 200 U/L   Potassium w/ Reflex to Magnesium   Result Value Ref Range    Potassium reflex Magnesium 6.5 (H) 3.5 - 5.0 mmol/L   Urinalysis with Microscopic   Result Value Ref Range    Color, UA BROWN (A) Straw/Yellow    Clarity, UA CLOUDY (A) Clear    Glucose, Ur 100 (A) Negative mg/dL    Bilirubin Urine LARGE (A) Negative    Ketones, Urine TRACE (A) Negative mg/dL    Specific Gravity, UA 1.025 1.005 - 1.030    Blood, Urine LARGE (A) Negative    pH, UA 6.5 5.0 - 9.0    Protein, UA >=300 (A) Negative mg/dL    Urobilinogen, Urine 2.0 (A) <2.0 E.U./dL    Nitrite, Urine POSITIVE (A) Negative    Leukocyte Esterase, Urine SMALL (A) Negative    WBC, UA 10-20 (A) 0 - 5 /HPF    RBC, UA 5-10 (A) 0 - 2 /HPF    Bacteria, UA RARE (A) None Seen /HPF   URINE DRUG SCREEN   Result Value Ref Range    Amphetamine Screen, Urine NOT DETECTED Negative <1000 ng/mL    Barbiturate Screen, Ur NOT DETECTED Negative < 200 ng/mL    Benzodiazepine Screen, Urine NOT DETECTED Negative < 200 ng/mL    Cannabinoid Scrn, Ur NOT DETECTED Negative < 50ng/mL    Cocaine Metabolite Screen, Urine POSITIVE (A) Negative < 300 ng/mL    Opiate Scrn, Ur NOT DETECTED Negative < 300ng/mL    PCP Screen, Urine NOT DETECTED Negative < 25 ng/mL    Methadone Screen, Urine NOT DETECTED Negative <300 ng/mL    Oxycodone Urine NOT DETECTED Negative <100 ng/mL    FENTANYL SCREEN, URINE POSITIVE (A) Negative <1 ng/mL    Drug Screen Comment: see below    Basic Metabolic Panel   Result Value Ref Range    Sodium 131 (L) 132 - 146 mmol/L    Potassium 5.6 (H) 3.5 - 5.0 mmol/L    Chloride 97 (L) 98 - 107 mmol/L    CO2 18 (L) 22 - 29 mmol/L    Anion Gap 16 7 - 16 mmol/L    Glucose 183 (H) 74 - 99 mg/dL    BUN 35 (H) 6 - 20 mg/dL    Creatinine 4.4 (H) 0.7 - 1.2 mg/dL    GFR Non-African American 18 >=60 mL/min/1.73    GFR African American 18     Calcium 6.8 (L) 8.6 - 10.2 mg/dL   Protein / creatinine ratio, urine   Result Value Ref Range    Protein, Ur 346 (H) 0 - 12 mg/dL    Creatinine, Ur 172 40 - 278 mg/dL    Protein/Creat Ratio 2.0 (H) 0.0 - 0.2    Protein/Creat Ratio 2.0    Osmolality, Urine   Result Value Ref Range    Osmolality, Ur 406 300 - 900 mOsm/kg   Serum Drug Screen   Result Value Ref Range    Ethanol Lvl <10 mg/dL    Acetaminophen Level <5.0 (L) 10.0 - 89.9 mcg/mL    Salicylate, Serum 0.5 0.0 - 30.0 mg/dL   Urine electrolytes   Result Value Ref Range    Sodium, Ur 82 Not Established mmol/L    Potassium, Ur 61.6 Not Established mmol/L    Chloride 53 Not Established mmol/L   Acetaminophen Level   Result Value Ref Range    Acetaminophen Level <5.0 (L) 10.0 - 30.0 mcg/mL   Hepatic Function Panel   Result Value Ref Range    Total Protein 6.3 (L) 6.4 - 8.3 g/dL    Albumin 3.1 (L) 3.5 - 5.2 g/dL Alkaline Phosphatase 76 40 - 129 U/L     (H) 0 - 40 U/L    AST 3,096 (H) 0 - 39 U/L    Total Bilirubin 0.4 0.0 - 1.2 mg/dL    Bilirubin, Direct <0.2 0.0 - 0.3 mg/dL    Bilirubin, Indirect see below 0.0 - 1.0 mg/dL   POCT Glucose   Result Value Ref Range    Glucose 94 mg/dL    QC OK? pass    POCT Glucose   Result Value Ref Range    Glucose 124 mg/dL    QC OK? ok    POCT Glucose   Result Value Ref Range    Glucose 172 mg/dL    QC OK? pass    POCT Glucose   Result Value Ref Range    Glucose 180 mg/dL    QC OK? ok    POCT Glucose   Result Value Ref Range    Glucose 187 mg/dL    QC OK? ok    POCT Glucose   Result Value Ref Range    Meter Glucose 94 74 - 99 mg/dL   POCT Glucose   Result Value Ref Range    Meter Glucose 124 (H) 74 - 99 mg/dL   POCT Glucose   Result Value Ref Range    Meter Glucose 172 (H) 74 - 99 mg/dL   POCT Glucose   Result Value Ref Range    Meter Glucose 180 (H) 74 - 99 mg/dL   POCT Glucose   Result Value Ref Range    Meter Glucose 430 (H) 74 - 99 mg/dL   POCT Glucose   Result Value Ref Range    Meter Glucose 187 (H) 74 - 99 mg/dL   POCT Glucose   Result Value Ref Range    Meter Glucose 176 (H) 74 - 99 mg/dL   POCT Glucose   Result Value Ref Range    Meter Glucose 199 (H) 74 - 99 mg/dL   EKG 12 Lead   Result Value Ref Range    Ventricular Rate 82 BPM    Atrial Rate 82 BPM    P-R Interval 136 ms    QRS Duration 88 ms    Q-T Interval 348 ms    QTc Calculation (Bazett) 406 ms    P Axis 77 degrees    R Axis 54 degrees    T Axis 52 degrees     Imaging: All Radiology results interpreted by Radiologist unless otherwise noted. US GALLBLADDER RUQ   Final Result   Unremarkable right upper quadrant ultrasound. US RETROPERITONEAL COMPLETE   Final Result   Bilateral echogenic renal cortices. Echogenic debris within the floor of the urinary bladder. Follow-up   recommended. CT Cervical Spine WO Contrast   Final Result   No acute abnormality of the cervical spine.          CT Head WO Contrast   Final Result   No acute intracranial abnormality. XR SHOULDER LEFT (MIN 2 VIEWS)   Final Result   No acute abnormality. XR PELVIS (1-2 VIEWS)   Final Result   No acute abnormality of the pelvis. US ABDOMEN COMPLETE    (Results Pending)   XR HUMERUS LEFT (MIN 2 VIEWS)    (Results Pending)   XR RADIUS ULNA LEFT (2 VIEWS)    (Results Pending)   XR ELBOW LEFT (MIN 3 VIEWS)    (Results Pending)       ---------------------------- NURSING NOTES AND VITALS REVIEWED -------------------------   The nursing notes within the ED encounter and vital signs as below have been reviewed.    BP (!) 142/96   Pulse 83   Temp 97.8 °F (36.6 °C) (Oral)   Resp 22   Ht 5' 10\" (1.778 m)   Wt 165 lb (74.8 kg)   SpO2 99%   BMI 23.68 kg/m²   Oxygen Saturation Interpretation: Normal      ------------------------------------------PROGRESS NOTES -------------------------------------------    ED COURSE MEDICATIONS:                Medications   glucose chewable tablet 16 g (has no administration in time range)   dextrose bolus 10% 125 mL (has no administration in time range)     Or   dextrose bolus 10% 250 mL (has no administration in time range)   glucagon (rDNA) injection 1 mg (has no administration in time range)   dextrose 10 % infusion (has no administration in time range)   sodium zirconium cyclosilicate (LOKELMA) oral suspension 10 g (10 g Oral Given 8/17/22 2000)   sodium bicarbonate 150 mEq in dextrose 5 % 1,000 mL infusion ( IntraVENous New Bag 8/17/22 1647)   sodium chloride flush 0.9 % injection 5-40 mL (10 mLs IntraVENous Given 8/17/22 1959)   sodium chloride flush 0.9 % injection 5-40 mL (has no administration in time range)   0.9 % sodium chloride infusion (has no administration in time range)   polyethylene glycol (GLYCOLAX) packet 17 g (has no administration in time range)   morphine (PF) injection 1 mg (1 mg IntraVENous Given 8/17/22 2010)   0.9 % sodium chloride bolus (0 mLs IntraVENous re-evaluations, IV medications, cardiac monitoring, continuous pulse oximetry, and complex medical decision making and emergency management    This patient has remained hemodynamically stable, improved, and been closely monitored during their ED course. DISPOSITION:  Disposition: Admit to telemetry. Patient condition is stable. CRITICAL CARE:   40 MINUTES. Please note that the withdrawal or failure to initiate urgent interventions for this patient would likely result in a life threatening deterioration or permanent disability. Accordingly this patient received the above mentioned time, excluding separately billable procedures. Patient was seen and evaluated by myself and my attending Michael Scanlon DO. Assessment and Plan discussed with attending provider, please see attestation for final plan of care.      MD Justus García MD  Resident  08/17/22 3597

## 2022-08-17 NOTE — CONSULTS
Department of Orthopedic Surgery  [facility] Alexei Garcia MD  Consult      Reason for Consult:  concern for left arm compartment syndrome    Consulting Physician: Dr Jonny Singh MD    HISTORY OF PRESENT ILLNESS:                The patient is a 42 y.o. male who was admitted early today with Rhabdomyolysis. Patient was found by father late last night on floor. Patient was able to be awakened but did not speak until today. Patient relates no pain in left arm until IV was placed. IV has since been removed. Localized swelling at old IV site. Denies numbness or tingling in left arm. Patient denies IV drug abuse but does admit to use of \"pills\". Past Medical History:        Diagnosis Date    Femur fracture [facility])      Past Surgical History:    History reviewed. No pertinent surgical history. Current Medications:   Current Facility-Administered Medications: glucose chewable tablet 16 g, 4 tablet, Oral, PRN  dextrose bolus 10% 125 mL, 125 mL, IntraVENous, PRN **OR** dextrose bolus 10% 250 mL, 250 mL, IntraVENous, PRN  glucagon (rDNA) injection 1 mg, 1 mg, SubCUTAneous, PRN  dextrose 10 % infusion, , IntraVENous, Continuous PRN  sodium zirconium cyclosilicate (LOKELMA) oral suspension 10 g, 10 g, Oral, Q8H  sodium bicarbonate 150 mEq in dextrose 5 % 1,000 mL infusion, , IntraVENous, Continuous  sodium chloride flush 0.9 % injection 5-40 mL, 5-40 mL, IntraVENous, 2 times per day  sodium chloride flush 0.9 % injection 5-40 mL, 5-40 mL, IntraVENous, PRN  0.9 % sodium chloride infusion, , IntraVENous, PRN  polyethylene glycol (GLYCOLAX) packet 17 g, 17 g, Oral, Daily PRN  calcium gluconate 1,000 mg in dextrose 5 % 100 mL IVPB, 1,000 mg, IntraVENous, Once  morphine (PF) injection 1 mg, 1 mg, IntraVENous, Q6H PRN  Allergies:  Patient has no known allergies. Social History:   TOBACCO:   reports that he has been smoking cigarettes. He has been smoking an average of .5 packs per day.  He has never used smokeless tobacco.  ETOH: reports no history of alcohol use. DRUGS:   reports current drug use. ACTIVITIES OF DAILY LIVING:    OCCUPATION:    Family History:       Problem Relation Age of Onset    Diabetes Mother     Hypertension Mother     Hypertension Father     Diabetes Father     Cancer Brother        REVIEW OF SYSTEMS:  Admits to unknown \"drug\" use. Pain to LUE/LLE. Loss of consciousness. Denies fevers, chills, cp, sob, n/v, ha, vision/hearing changes, wt changes, hot/cold flashes, other open skin lesions, diarrhea, constipation, dysuria/hematuria unless noted in HPI. Complete ROS performed with the patient and is otherwise negative. PHYSICAL EXAM:    VITALS:  BP (!) 124/93   Pulse 91   Temp 98.1 °F (36.7 °C) (Oral)   Resp 20   Ht 5' 10\" (1.778 m)   Wt 165 lb (74.8 kg)   SpO2 97%   BMI 23.68 kg/m²   CONSTITUTIONAL:  awake, alert, cooperative, no apparent distress, and appears stated age  EYES:  Lids and lashes normal, pupils equal, round and reactive to light, extra ocular muscles intact, sclera clear, conjunctiva normal  ENT:  Normocephalic, without obvious abnormality, atraumatic, sinuses nontender on palpation, external ears without lesions, oral pharynx with moist mucus membranes, tonsils without erythema or exudates, gums normal and good dentition. NECK:  Supple, symmetrical, trachea midline, no adenopathy, thyroid symmetric, not enlarged and no tenderness, skin normal  NEUROLOGIC:  Awake, alert, oriented to name, place and time. Cranial nerves II-XII are grossly intact. Motor is 5 out of 5 bilaterally. Sensory is intact. MUSCULOSKELETAL:    Left upper extremity: Nontender over the shoulder. Soft and compressible anterior and posterior upper arm compartments. Nontender over the medial lateral elbow. Puncture wound of the antecubital fossa with localized swelling in this region. No warmth erythema. Anterior and posterior forearm compartments soft and compressible. 2+ radial and ulnar pulses.   Hand is warm and well perfused and brisk capillary refill of the thumb index middle ring and small fingers. Able to fully flex and extend the thumb index middle ring and small fingers. Mild pain with passive extension of the index through small fingers. Sensation intact in the radial, ulnar, and median nerve distributions distally. Motor testing 5/5 grossly. Decreased elbow flexion extension secondary discomfort over the anterior antecubital fossa region. Vearl Pippins DATA:    CBC:   Lab Results   Component Value Date/Time    WBC 17.0 08/17/2022 10:38 AM    RBC 6.65 08/17/2022 10:38 AM    HGB 19.8 08/17/2022 10:38 AM    HCT 59.5 08/17/2022 10:38 AM    MCV 89.5 08/17/2022 10:38 AM    MCH 29.8 08/17/2022 10:38 AM    MCHC 33.3 08/17/2022 10:38 AM    RDW 16.0 08/17/2022 10:38 AM     08/17/2022 10:38 AM    MPV 9.4 08/17/2022 10:38 AM     PT/INR:  No results found for: PROTIME, INR    Radiology Review: X-rays of the left shoulder from earlier today at 10:25 AM were reviewed. Negative for any acute fracture dislocations. X-rays of the left humerus, left elbow, and left forearm were ordered but not completed at the time of this dictation. Toxicology positive for cocaine and fentanyl. IMPRESSION:  No signs of arm or forearm compartment syndrome at this time  Findings are compatible with a IV infiltrate of the left antecubital fossa. PLAN:  I have explained today's findings with the patient. He does not have any findings suggestive of a compartment syndrome at this time. Findings are compatible with a IV infiltrated antecubital fossa. Recommended local treatments with ice and compression. We will await x-ray findings. Anticipate these findings to be negative for fracture given patient's clinical history.     Electronically signed by Basim Castañeda MD on 8/17/2022 at 7:49 PM

## 2022-08-17 NOTE — ED NOTES
Lab reporting critical Potassium level of 8.3; LIP notified, new orders for hypokalemia protocol entered.      Chhaya Fischer, CHAPO  08/17/22 2165

## 2022-08-17 NOTE — H&P
[facility]   History and Physical      CHIEF COMPLAINT:  S/P fall LLE/pain and numbness    History of Present Illness:  42 y.o. male with no significant medical history presents with LLE numbness and pain following fall at home. Pt complaint is moderate in severity, persistent, worsened by nothing. Pt states he does not recall what happened. Mother at bedside. States last known well about 3pm yesterday. States father went to check on pt at about midnight. Found pt on floor and not responsive. States pt did awaken, but did not begin to speak until this morning. Continued pain pt brought into ED  for further evaluation. When asked about drug use pt states \"Pills\" when asked what type of pills, pt states he does not know. Denies N/VD, fevers, chills, CP, SOB. Renal US Bilateral echogenic renal cortices. Echogenic debris within floor bladder. CT Head negative. CT Cervical Spine negative. XR shoulder negative. XR Pelvis negative. K+ 5.6. Na+ 131. BUN/Crt 35/4.4. WBC 17.0. H/H 19.8/59.5. ALT 1,005. AST 3665. CCK >22K. Ethanol<10. Cynthia Bloodgood UA+nitrates Small Leukocyte Esterase. Urine culture pending. Nephrology and GI consulted. Decision to admit patient for further evaluation and treatment. Informant(s) for H&P:Patient, mother, and EMR    REVIEW OF SYSTEMS:  Admits to unknown \"drug\" use. Pain to LUE/LLE. Loss of consciousness. Denies fevers, chills, cp, sob, n/v, ha, vision/hearing changes, wt changes, hot/cold flashes, other open skin lesions, diarrhea, constipation, dysuria/hematuria unless noted in HPI. Complete ROS performed with the patient and is otherwise negative. PMH:  No past medical history on file. Surgical History:  No past surgical history on file. Medications Prior to Admission:    Prior to Admission medications    Medication Sig Start Date End Date Taking?  Authorizing Provider   acetaminophen (TYLENOL) 500 MG tablet Take 2 tablets by mouth 4 times daily as needed for Pain 9/12/19 9/22/19  Drucella Tacoma, APRN - CNP   ibuprofen (ADVIL;MOTRIN) 800 MG tablet Take 1 tablet by mouth every 6 hours as needed for Pain 9/12/19 9/22/19  Drucella Tacoma, APRN - CNP   Magic Mouthwash (MIRACLE MOUTHWASH) Swish and spit 5 mLs 4 times daily as needed for Irritation 9/12/19   Drucella Tacoma, APRN - CNP   cyclobenzaprine (FLEXERIL) 10 MG tablet Take 1 tablet by mouth 2 times daily as needed for Muscle spasms 4/30/18   Forrestine Grater, DO   albuterol sulfate HFA (PROVENTIL HFA) 108 (90 Base) MCG/ACT inhaler Inhale 2 puffs into the lungs every 4 hours as needed for Wheezing 9/14/17 9/14/18  Carla Ann PA-C       Allergies:    Patient has no known allergies. Social History:    reports that he has been smoking. He has been smoking an average of .5 packs per day. He has never used smokeless tobacco. He reports that he does not drink alcohol and does not use drugs. Family History:   family history is not on file.  Reviewed with mother updated  PHYSICAL EXAM:  Vitals:  /70   Pulse 84   Temp 97.9 °F (36.6 °C) (Oral)   Resp 18   Ht 5' 10\" (1.778 m)   Wt 165 lb (74.8 kg)   SpO2 98%   BMI 23.68 kg/m²     General Appearance: alert and oriented to person, place and time and in no acute distress  Skin: warm and dry  Head: normocephalic and atraumatic  Eyes: pupils equal, round, and reactive to light, extraocular eye movements intact, conjunctivae normal  Neck: neck supple and non tender without mass   Pulmonary/Chest: clear to auscultation bilaterally- no wheezes, rales or rhonchi, normal air movement, no respiratory distress  Cardiovascular: normal rate, normal S1 and S2 and no RGM  Abdomen: soft, non-tender, non-distended, normal bowel sounds  Extremities: no cyanosis, no clubbing and no edema  Neurologic: no cranial nerve deficit and speech normal    LABS:  Recent Labs     08/17/22  1038 08/17/22  1150 08/17/22  1401 08/17/22  1411 08/17/22  1508 08/17/22  1558     -- 131*  --   --   --    K 8.3*  --  5.6*  --   --   --    CL 94*  --  97*  --   --   --    CO2 20*  --  18*  --   --   --    BUN 32*  --  35*  --   --   --    CREATININE 4.3*  --  4.4*  --   --   --    GLUCOSE 128*   < > 183* 172 180 187   CALCIUM 7.3*  --  6.8*  --   --   --     < > = values in this interval not displayed. Recent Labs     08/17/22  1038   WBC 17.0*   RBC 6.65*   HGB 19.8*   HCT 59.5*   MCV 89.5   MCH 29.8   MCHC 33.3   RDW 16.0*      MPV 9.4       No results for input(s): POCGLU in the last 72 hours. Radiology: XR PELVIS (1-2 VIEWS)    Result Date: 8/17/2022  EXAMINATION: ONE XRAY VIEW OF THE PELVIS 8/17/2022 10:21 am COMPARISON: None. HISTORY: ORDERING SYSTEM PROVIDED HISTORY: fall TECHNOLOGIST PROVIDED HISTORY: Reason for exam:->fall FINDINGS: No evidence of pelvic fracture. Bilateral hips demonstrate normal alignment. No focal osseous lesion. SI joints are symmetric. No acute abnormality of the pelvis. CT Head WO Contrast    Result Date: 8/17/2022  EXAMINATION: CT OF THE HEAD WITHOUT CONTRAST  8/17/2022 10:34 am TECHNIQUE: CT of the head was performed without the administration of intravenous contrast. Automated exposure control, iterative reconstruction, and/or weight based adjustment of the mA/kV was utilized to reduce the radiation dose to as low as reasonably achievable. COMPARISON: None. HISTORY: ORDERING SYSTEM PROVIDED HISTORY: fall TECHNOLOGIST PROVIDED HISTORY: Reason for exam:->fall Has a \"code stroke\" or \"stroke alert\" been called? ->No Decision Support Exception - unselect if not a suspected or confirmed emergency medical condition->Emergency Medical Condition (MA) FINDINGS: BRAIN/VENTRICLES: There is no acute intracranial hemorrhage, mass effect or midline shift. No abnormal extra-axial fluid collection. The gray-white differentiation is maintained without evidence of an acute infarct. There is no evidence of hydrocephalus.  ORBITS: The visualized portion of the orbits demonstrate no acute abnormality. SINUSES: The visualized paranasal sinuses and mastoid air cells demonstrate no acute abnormality. SOFT TISSUES/SKULL:  No acute abnormality of the visualized skull or soft tissues. No acute intracranial abnormality. CT Cervical Spine WO Contrast    Result Date: 8/17/2022  EXAMINATION: CT OF THE CERVICAL SPINE WITHOUT CONTRAST 8/17/2022 10:34 am TECHNIQUE: CT of the cervical spine was performed without the administration of intravenous contrast. Multiplanar reformatted images are provided for review. Automated exposure control, iterative reconstruction, and/or weight based adjustment of the mA/kV was utilized to reduce the radiation dose to as low as reasonably achievable. COMPARISON: None. HISTORY: ORDERING SYSTEM PROVIDED HISTORY: fall TECHNOLOGIST PROVIDED HISTORY: Reason for exam:->fall Decision Support Exception - unselect if not a suspected or confirmed emergency medical condition->Emergency Medical Condition (MA) FINDINGS: BONES/ALIGNMENT: There is no acute fracture or traumatic malalignment. There is straightening of lordotic curvature likely due to muscular spasm and/or positioning of the neck. DEGENERATIVE CHANGES: No significant degenerative changes. SOFT TISSUES: There is no prevertebral soft tissue swelling. Lung apices: Small right apical bullae. No acute abnormality of the cervical spine. XR SHOULDER LEFT (MIN 2 VIEWS)    Result Date: 8/17/2022  EXAMINATION: THREE XRAY VIEWS OF THE LEFT SHOULDER 8/17/2022 10:21 am COMPARISON: None. HISTORY: ORDERING SYSTEM PROVIDED HISTORY: fall TECHNOLOGIST PROVIDED HISTORY: Reason for exam:->fall FINDINGS: Glenohumeral joint is normally aligned. No evidence of acute fracture or dislocation. No abnormal periarticular calcifications. The [facility] joint is unremarkable in appearance. Visualized lung is unremarkable. No acute abnormality.      US RETROPERITONEAL COMPLETE    Result Date: 8/17/2022  EXAMINATION: RETROPERITONEAL ULTRASOUND OF THE KIDNEYS AND URINARY BLADDER 8/17/2022 COMPARISON: None HISTORY: ORDERING SYSTEM PROVIDED HISTORY: helga, r/o hydronephrosis TECHNOLOGIST PROVIDED HISTORY: Reason for exam:->helga, r/o hydronephrosis What reading provider will be dictating this exam?->CRC FINDINGS: Kidneys: The right kidney measures 10.0 cm in length and the left kidney measures 11.6 cm in length. Bilateral echogenic renal cortices. No evidence of hydronephrosis or intrarenal stones. Bladder: Echogenic debris within the floor of the urinary bladder. Bilateral echogenic renal cortices. Echogenic debris within the floor of the urinary bladder. Follow-up recommended. EKG: Interpretation per ED Physician: No evidence of acute ischemic injury, but peaked T waves visible in multiple leads. ASSESSMENT:      Principal Problem:    Hyperkalemia  Resolved Problems:    * No resolved hospital problems. *      PLAN:    Rhabdomyolysis- S/P Fall at home. Unknown downtime. Last well 3pm. Found at about midnight by father. CK >22K. Received 1LNSS bolus. HCO3 infusion initiated. Continue NSS @250/hr. Follow CK. HELGA- BUN/Crt 35/4.4. 1L NSS bolus. HCO3 infusion initiated. Avoid Nephro toxic agents. Continue IVF hydration. Nephrology input appreciated. Hyperkalemia- K+ 5.6. Ca+Gluc/HCO3/Regular Insulin/Lokelma given in ED Course. EKG No evidence of acute ischemic injury, but peaked T waves visible in multiple leads. Nephrology input appreciated. Suspected Shock Liver- In the setting of Rhabdomyolysis. Does admit to taking \"Pills\" unknow what type. Will check Acetaminophen level. Ethanol <10. UDS pending. Abdomina; US pending. Hepatitis panel/HIV/INR pending. ALT 1005. AST 3665. Avoind heptotoxic agents GI contacted while in ED course with recommendations and updates per ED physician. GI input appreciated   Unknown Drug Use- Admits to taking \"pills\". Not sure as to what. UDS pending.  Social work for possible community resources/DC planning.  cessation. Follow for withdrawal. Consider COWS protocol. Elevated LFTs- ALT 1005 AST 3665. Bilirubin 0.4. Alk Phos 90. Monitor. Elevated Glucose- No known DM. Glucose 187. A1C in am. Follow      Code Status: Full   DVT prophylaxis: Heparin    NOTE: This report was transcribed using voice recognition software. Every effort was made to ensure accuracy; however, inadvertent computerized transcription errors may be present. Electronically signed by Tapan Ahn CNP on 8/17/2022 at 4:15 PM

## 2022-08-17 NOTE — PROGRESS NOTES
Dr. Andrew Blackman called the unit to let me know she talked to the house doctor and he does not do central lines so we can call the icu NP to see if they will do it, called Ian Rice and he let me know he is not covered  to place a central line on thefloor that we would have to transfer a patient down to icu to get a central line. Also spoke with IV team RN who stated the pt has no viable compressive veins and a picc is not an option she searched with the ultrasound machine for a vein. Instructed the pt would need an IJ will follow up with the RN, and hospitalist to see how we will obtain labs. Also called down to the ER to see if previous RN could let us know which medication was given to cause the infiltrate that RN  was not here.

## 2022-08-18 ENCOUNTER — APPOINTMENT (OUTPATIENT)
Dept: GENERAL RADIOLOGY | Age: 37
DRG: 351 | End: 2022-08-18
Payer: MEDICAID

## 2022-08-18 ENCOUNTER — APPOINTMENT (OUTPATIENT)
Dept: ULTRASOUND IMAGING | Age: 37
DRG: 351 | End: 2022-08-18
Payer: MEDICAID

## 2022-08-18 LAB
ALBUMIN SERPL-MCNC: 2.6 G/DL (ref 3.5–5.2)
ALP BLD-CCNC: 76 U/L (ref 40–129)
ALT SERPL-CCNC: 743 U/L (ref 0–40)
ANION GAP SERPL CALCULATED.3IONS-SCNC: 14 MMOL/L (ref 7–16)
ANION GAP SERPL CALCULATED.3IONS-SCNC: 14 MMOL/L (ref 7–16)
ANION GAP SERPL CALCULATED.3IONS-SCNC: 17 MMOL/L (ref 7–16)
AST SERPL-CCNC: 2297 U/L (ref 0–39)
BACTERIA: ABNORMAL /HPF
BASOPHILS ABSOLUTE: 0.02 E9/L (ref 0–0.2)
BASOPHILS RELATIVE PERCENT: 0.1 % (ref 0–2)
BILIRUB SERPL-MCNC: 0.4 MG/DL (ref 0–1.2)
BILIRUBIN URINE: ABNORMAL
BLOOD, URINE: ABNORMAL
BUN BLDV-MCNC: 40 MG/DL (ref 6–20)
BUN BLDV-MCNC: 44 MG/DL (ref 6–20)
BUN BLDV-MCNC: 48 MG/DL (ref 6–20)
CALCIUM SERPL-MCNC: 6.3 MG/DL (ref 8.6–10.2)
CALCIUM SERPL-MCNC: 6.4 MG/DL (ref 8.6–10.2)
CALCIUM SERPL-MCNC: 7.8 MG/DL (ref 8.6–10.2)
CHLORIDE BLD-SCNC: 89 MMOL/L (ref 98–107)
CHLORIDE BLD-SCNC: 90 MMOL/L (ref 98–107)
CHLORIDE BLD-SCNC: 92 MMOL/L (ref 98–107)
CHOLESTEROL, TOTAL: 156 MG/DL (ref 0–199)
CLARITY: ABNORMAL
CO2: 23 MMOL/L (ref 22–29)
CO2: 23 MMOL/L (ref 22–29)
CO2: 28 MMOL/L (ref 22–29)
COLOR: ABNORMAL
CREAT SERPL-MCNC: 5.5 MG/DL (ref 0.7–1.2)
CREAT SERPL-MCNC: 6.1 MG/DL (ref 0.7–1.2)
CREAT SERPL-MCNC: 7.5 MG/DL (ref 0.7–1.2)
EOSINOPHILS ABSOLUTE: 0.01 E9/L (ref 0.05–0.5)
EOSINOPHILS RELATIVE PERCENT: 0.1 % (ref 0–6)
GFR AFRICAN AMERICAN: 10
GFR AFRICAN AMERICAN: 12
GFR AFRICAN AMERICAN: 14
GFR NON-AFRICAN AMERICAN: 10 ML/MIN/1.73
GFR NON-AFRICAN AMERICAN: 12 ML/MIN/1.73
GFR NON-AFRICAN AMERICAN: 14 ML/MIN/1.73
GLUCOSE BLD-MCNC: 110 MG/DL (ref 74–99)
GLUCOSE BLD-MCNC: 139 MG/DL (ref 74–99)
GLUCOSE BLD-MCNC: 180 MG/DL (ref 74–99)
GLUCOSE URINE: 100 MG/DL
HBA1C MFR BLD: 5.9 % (ref 4–5.6)
HCT VFR BLD CALC: 45.8 % (ref 37–54)
HDLC SERPL-MCNC: 39 MG/DL
HEMOGLOBIN: 15.9 G/DL (ref 12.5–16.5)
IMMATURE GRANULOCYTES #: 0.04 E9/L
IMMATURE GRANULOCYTES %: 0.3 % (ref 0–5)
KETONES, URINE: ABNORMAL MG/DL
LDL CHOLESTEROL CALCULATED: 92 MG/DL (ref 0–99)
LEUKOCYTE ESTERASE, URINE: ABNORMAL
LYMPHOCYTES ABSOLUTE: 2.06 E9/L (ref 1.5–4)
LYMPHOCYTES RELATIVE PERCENT: 14.5 % (ref 20–42)
MAGNESIUM: 2 MG/DL (ref 1.6–2.6)
MCH RBC QN AUTO: 29.6 PG (ref 26–35)
MCHC RBC AUTO-ENTMCNC: 34.7 % (ref 32–34.5)
MCV RBC AUTO: 85.1 FL (ref 80–99.9)
METER GLUCOSE: 104 MG/DL (ref 74–99)
METER GLUCOSE: 121 MG/DL (ref 74–99)
MONOCYTES ABSOLUTE: 1.22 E9/L (ref 0.1–0.95)
MONOCYTES RELATIVE PERCENT: 8.6 % (ref 2–12)
NEUTROPHILS ABSOLUTE: 10.88 E9/L (ref 1.8–7.3)
NEUTROPHILS RELATIVE PERCENT: 76.4 % (ref 43–80)
NITRITE, URINE: POSITIVE
PDW BLD-RTO: 14.5 FL (ref 11.5–15)
PH UA: 7 (ref 5–9)
PHOSPHORUS: 6.5 MG/DL (ref 2.5–4.5)
PLATELET # BLD: 294 E9/L (ref 130–450)
PMV BLD AUTO: 9.3 FL (ref 7–12)
POTASSIUM REFLEX MAGNESIUM: 4.6 MMOL/L (ref 3.5–5)
POTASSIUM SERPL-SCNC: 4.7 MMOL/L (ref 3.5–5)
POTASSIUM SERPL-SCNC: 5 MMOL/L (ref 3.5–5)
PROTEIN UA: >=300 MG/DL
RBC # BLD: 5.38 E12/L (ref 3.8–5.8)
RBC UA: ABNORMAL /HPF (ref 0–2)
SODIUM BLD-SCNC: 129 MMOL/L (ref 132–146)
SODIUM BLD-SCNC: 129 MMOL/L (ref 132–146)
SODIUM BLD-SCNC: 132 MMOL/L (ref 132–146)
SPECIFIC GRAVITY UA: 1.02 (ref 1–1.03)
TOTAL CK: ABNORMAL U/L (ref 20–200)
TOTAL PROTEIN: 5.2 G/DL (ref 6.4–8.3)
TRIGL SERPL-MCNC: 125 MG/DL (ref 0–149)
UROBILINOGEN, URINE: 2 E.U./DL
VLDLC SERPL CALC-MCNC: 25 MG/DL
WBC # BLD: 14.2 E9/L (ref 4.5–11.5)
WBC UA: ABNORMAL /HPF (ref 0–5)

## 2022-08-18 PROCEDURE — 80048 BASIC METABOLIC PNL TOTAL CA: CPT

## 2022-08-18 PROCEDURE — 99233 SBSQ HOSP IP/OBS HIGH 50: CPT | Performed by: PHYSICIAN ASSISTANT

## 2022-08-18 PROCEDURE — 2580000003 HC RX 258: Performed by: INTERNAL MEDICINE

## 2022-08-18 PROCEDURE — 02HV33Z INSERTION OF INFUSION DEVICE INTO SUPERIOR VENA CAVA, PERCUTANEOUS APPROACH: ICD-10-PCS | Performed by: STUDENT IN AN ORGANIZED HEALTH CARE EDUCATION/TRAINING PROGRAM

## 2022-08-18 PROCEDURE — 36415 COLL VENOUS BLD VENIPUNCTURE: CPT

## 2022-08-18 PROCEDURE — 80053 COMPREHEN METABOLIC PANEL: CPT

## 2022-08-18 PROCEDURE — 2580000003 HC RX 258: Performed by: NURSE PRACTITIONER

## 2022-08-18 PROCEDURE — 82550 ASSAY OF CK (CPK): CPT

## 2022-08-18 PROCEDURE — 82962 GLUCOSE BLOOD TEST: CPT

## 2022-08-18 PROCEDURE — 83735 ASSAY OF MAGNESIUM: CPT

## 2022-08-18 PROCEDURE — 81001 URINALYSIS AUTO W/SCOPE: CPT

## 2022-08-18 PROCEDURE — 87088 URINE BACTERIA CULTURE: CPT

## 2022-08-18 PROCEDURE — 99233 SBSQ HOSP IP/OBS HIGH 50: CPT | Performed by: STUDENT IN AN ORGANIZED HEALTH CARE EDUCATION/TRAINING PROGRAM

## 2022-08-18 PROCEDURE — 99253 IP/OBS CNSLTJ NEW/EST LOW 45: CPT | Performed by: SURGERY

## 2022-08-18 PROCEDURE — 85025 COMPLETE CBC W/AUTO DIFF WBC: CPT

## 2022-08-18 PROCEDURE — 6370000000 HC RX 637 (ALT 250 FOR IP): Performed by: NURSE PRACTITIONER

## 2022-08-18 PROCEDURE — 93971 EXTREMITY STUDY: CPT

## 2022-08-18 PROCEDURE — 6360000002 HC RX W HCPCS: Performed by: INTERNAL MEDICINE

## 2022-08-18 PROCEDURE — 84100 ASSAY OF PHOSPHORUS: CPT

## 2022-08-18 PROCEDURE — 80061 LIPID PANEL: CPT

## 2022-08-18 PROCEDURE — 73552 X-RAY EXAM OF FEMUR 2/>: CPT

## 2022-08-18 PROCEDURE — 99232 SBSQ HOSP IP/OBS MODERATE 35: CPT | Performed by: ORTHOPAEDIC SURGERY

## 2022-08-18 PROCEDURE — 2060000000 HC ICU INTERMEDIATE R&B

## 2022-08-18 PROCEDURE — 6360000002 HC RX W HCPCS: Performed by: NURSE PRACTITIONER

## 2022-08-18 PROCEDURE — 83036 HEMOGLOBIN GLYCOSYLATED A1C: CPT

## 2022-08-18 RX ORDER — HEPARIN SODIUM (PORCINE) LOCK FLUSH IV SOLN 100 UNIT/ML 100 UNIT/ML
100 SOLUTION INTRAVENOUS PRN
Status: DISCONTINUED | OUTPATIENT
Start: 2022-08-18 | End: 2022-09-02 | Stop reason: HOSPADM

## 2022-08-18 RX ORDER — MORPHINE SULFATE 4 MG/ML
4 INJECTION, SOLUTION INTRAMUSCULAR; INTRAVENOUS EVERY 4 HOURS PRN
Status: DISCONTINUED | OUTPATIENT
Start: 2022-08-18 | End: 2022-08-19

## 2022-08-18 RX ADMIN — CALCIUM GLUCONATE 2000 MG: 98 INJECTION, SOLUTION INTRAVENOUS at 19:53

## 2022-08-18 RX ADMIN — MORPHINE SULFATE 4 MG: 4 INJECTION, SOLUTION INTRAMUSCULAR; INTRAVENOUS at 10:15

## 2022-08-18 RX ADMIN — MORPHINE SULFATE 4 MG: 4 INJECTION, SOLUTION INTRAMUSCULAR; INTRAVENOUS at 18:48

## 2022-08-18 RX ADMIN — MORPHINE SULFATE 4 MG: 4 INJECTION, SOLUTION INTRAMUSCULAR; INTRAVENOUS at 05:24

## 2022-08-18 RX ADMIN — SODIUM CHLORIDE: 9 INJECTION, SOLUTION INTRAVENOUS at 04:08

## 2022-08-18 RX ADMIN — Medication 10 ML: at 14:49

## 2022-08-18 RX ADMIN — SODIUM ZIRCONIUM CYCLOSILICATE 10 G: 10 POWDER, FOR SUSPENSION ORAL at 04:06

## 2022-08-18 RX ADMIN — MORPHINE SULFATE 4 MG: 4 INJECTION, SOLUTION INTRAMUSCULAR; INTRAVENOUS at 14:46

## 2022-08-18 RX ADMIN — SODIUM CHLORIDE: 9 INJECTION, SOLUTION INTRAVENOUS at 23:07

## 2022-08-18 RX ADMIN — MORPHINE SULFATE 4 MG: 4 INJECTION, SOLUTION INTRAMUSCULAR; INTRAVENOUS at 01:16

## 2022-08-18 ASSESSMENT — PAIN - FUNCTIONAL ASSESSMENT
PAIN_FUNCTIONAL_ASSESSMENT: PREVENTS OR INTERFERES SOME ACTIVE ACTIVITIES AND ADLS
PAIN_FUNCTIONAL_ASSESSMENT: PREVENTS OR INTERFERES WITH MANY ACTIVE NOT PASSIVE ACTIVITIES
PAIN_FUNCTIONAL_ASSESSMENT: PREVENTS OR INTERFERES SOME ACTIVE ACTIVITIES AND ADLS

## 2022-08-18 ASSESSMENT — PAIN DESCRIPTION - ONSET
ONSET: ON-GOING
ONSET: ON-GOING

## 2022-08-18 ASSESSMENT — PAIN SCALES - GENERAL
PAINLEVEL_OUTOF10: 10
PAINLEVEL_OUTOF10: 7
PAINLEVEL_OUTOF10: 10

## 2022-08-18 ASSESSMENT — PAIN DESCRIPTION - LOCATION
LOCATION: ARM;LEG
LOCATION: LEG;ARM
LOCATION: ARM;LEG

## 2022-08-18 ASSESSMENT — PAIN DESCRIPTION - ORIENTATION
ORIENTATION: LEFT

## 2022-08-18 ASSESSMENT — PAIN DESCRIPTION - PAIN TYPE
TYPE: ACUTE PAIN

## 2022-08-18 ASSESSMENT — PAIN DESCRIPTION - DESCRIPTORS
DESCRIPTORS: SHARP;SHOOTING
DESCRIPTORS: SHOOTING;SHARP

## 2022-08-18 ASSESSMENT — PAIN DESCRIPTION - FREQUENCY
FREQUENCY: CONTINUOUS
FREQUENCY: CONTINUOUS

## 2022-08-18 NOTE — PROGRESS NOTES
Dr. Cisco Rothman notified that stat IR PICC would have to be done downtown. Per Dr. Cisco Rothman no transfer downtown, surgical residents will place central line today. IR and bedside RN updated.

## 2022-08-18 NOTE — CARE COORDINATION
Met w/ patient. Explained role of  and plan of care. Lives w/ his mother in an apartment-6 steps to entrance. No Hx DME or HHC. Hx inpatient drug rehab [facility] approx 2 months ago. [facility] offered and declined. [facility] list offered and declined. States he is not interested in rehab. Does not have a PCP- PCP Pre-service # placed on discharge instructions. Does not drive. Per pt, plan is to return home on discharge- states a friend or family member will provide transportation.  Will follow Ivonne Rogel RNcase manager

## 2022-08-18 NOTE — PROCEDURES
Gulshan Garza is a 42 y.o. male patient. 1. Hyperkalemia    2. Traumatic rhabdomyolysis, initial encounter ([facility] [address]    3. Shock liver    4. Fall, initial encounter    5. Leukocytosis, unspecified type    6. Acute kidney injury [facility])      Past Medical History:   Diagnosis Date    Femur fracture (HCC)      Blood pressure (!) 143/94, pulse 74, temperature 97.7 °F (36.5 °C), temperature source Oral, resp. rate 20, height 5' 10\" (1.778 m), weight 165 lb (74.8 kg), SpO2 97 %. Central Line    Date/Time: 8/18/2022 1:44 PM  Performed by: Polly Hawthorne MD  Authorized by: Polly Hawthorne MD   Consent: Verbal consent obtained. Written consent obtained.   Risks and benefits: risks, benefits and alternatives were discussed  Consent given by: patient  Patient understanding: patient states understanding of the procedure being performed  Patient consent: the patient's understanding of the procedure matches consent given  Procedure consent: procedure consent matches procedure scheduled  Patient identity confirmed: arm band  Indications: vascular access  Anesthesia: local infiltration    Anesthesia:  Local Anesthetic: lidocaine 1% with epinephrine    Sedation:  Patient sedated: no    Preparation: skin prepped with 2% chlorhexidine  Skin prep agent dried: skin prep agent completely dried prior to procedure  Sterile barriers: all five maximum sterile barriers used - cap, mask, sterile gown, sterile gloves, and large sterile sheet  Hand hygiene: hand hygiene performed prior to central venous catheter insertion  Location details: right femoral  Patient position: flat  Catheter type: triple lumen  Catheter size: 8.5 Fr  Pre-procedure: landmarks identified  Ultrasound guidance: yes  Sterile ultrasound techniques: sterile gel and sterile probe covers were used  Number of attempts: 1  Successful placement: yes  Post-procedure: line sutured and dressing applied  Assessment: blood return through all ports and free fluid flow  Patient tolerance: patient tolerated the procedure well with no immediate complications        Verna Edge MD  8/18/2022

## 2022-08-18 NOTE — CONSULTS
Department of Orthopedic Surgery  History and Physical      CHIEF COMPLAINT:  left thigh pain    HISTORY OF PRESENT ILLNESS:                The patient is a 42 y.o. male who presents with left thigh pain. Patient has been seen in the hospital with IV infiltrate of the left antecubital fossa. Patient was admitted with Rhabdomyolysis. Patient relates no pain in left arm until IV was placed. IV has since been removed. Localized swelling at old IV site. Denies numbness or tingling in left arm. Patient denies IV drug abuse but does admit to use of \"pills\". He reports continued pain to his left upper extremity. This now includes his shoulder. We were then consulted for left thigh pain. Patient reports pain started yesterday. He reports \"numbness\" to his lateral thigh but when questioned further he starts this feels more like \"fullness\" to his thigh. He denies any trauma to his thigh. On exam patient is very tired and hard to keep awake. Past Medical History:        Diagnosis Date    Femur fracture [facility])      Past Surgical History:    History reviewed. No pertinent surgical history.   Current Medications:   Current Facility-Administered Medications: morphine sulfate (PF) injection 4 mg, 4 mg, IntraVENous, Q4H PRN  heparin flush 100 UNIT/ML injection 100 Units, 100 Units, IntraCATHeter, PRN  glucose chewable tablet 16 g, 4 tablet, Oral, PRN  dextrose bolus 10% 125 mL, 125 mL, IntraVENous, PRN **OR** dextrose bolus 10% 250 mL, 250 mL, IntraVENous, PRN  glucagon (rDNA) injection 1 mg, 1 mg, SubCUTAneous, PRN  dextrose 10 % infusion, , IntraVENous, Continuous PRN  sodium chloride flush 0.9 % injection 5-40 mL, 5-40 mL, IntraVENous, 2 times per day  sodium chloride flush 0.9 % injection 5-40 mL, 5-40 mL, IntraVENous, PRN  0.9 % sodium chloride infusion, , IntraVENous, PRN  polyethylene glycol (GLYCOLAX) packet 17 g, 17 g, Oral, Daily PRN  0.9 % sodium chloride infusion, , IntraVENous, Continuous  Allergies:  Patient has no known allergies. Social History:   TOBACCO:   reports that he has been smoking cigarettes. He has been smoking an average of .5 packs per day. He has never used smokeless tobacco.  ETOH:   reports no history of alcohol use. DRUGS:   reports current drug use. ACTIVITIES OF DAILY LIVING:    OCCUPATION:    Family History:       Problem Relation Age of Onset    Diabetes Mother     Hypertension Mother     Hypertension Father     Diabetes Father     Cancer Brother        REVIEW OF SYSTEMS:  CONSTITUTIONAL:  negative  EYES:  negative  HEENT:  negative  RESPIRATORY:  negative  CARDIOVASCULAR:  negative  MUSCULOSKELETAL:  left thigh and left arm pain  NEUROLOGICAL:  negative    PHYSICAL EXAM:    VITALS:  BP (!) 142/76   Pulse 67   Temp 98.2 °F (36.8 °C) (Axillary)   Resp 16   Ht 5' 10\" (1.778 m)   Wt 165 lb (74.8 kg)   SpO2 100%   BMI 23.68 kg/m²   CONSTITUTIONAL:  awake, alert, cooperative, no apparent distress, and appears stated age    MUSCULOSKELETAL:      Left upper extremity: skin intact. Left shoulder with mild erythema to the lateral portion of his shoulder. Left upper extremity swollen including hand and fingers. Compartments are firm but compressible. Forearm with fullness proximally near elbow. Firm and compressible distal forearm volar and dorsal. 2+ radial and ulnar pulses at wrist. Able to flex and extend digits, wrist, and elbow with mild discomfort. Left upper extremity warm. BCR fingers. Radial, ulnar and median nerves intact to light touch      Left lower extremity: skin is intact. No erythema or signs of infection. Thigh swollen with compartments that are firm but compressible with some discomfort to the lateral thigh. Mild discomfort with passive and active knee flexion and extension and hip internal and external rotation. 2+ DP/PT pulses. No pain to the knee, calf, or ankle. Distal sensory intact. Extremity warm and well perfused. Brisk capillary refill.        DATA:    CBC:   Lab Results Component Value Date/Time    WBC 14.2 08/18/2022 05:50 AM    RBC 5.38 08/18/2022 05:50 AM    HGB 15.9 08/18/2022 05:50 AM    HCT 45.8 08/18/2022 05:50 AM    MCV 85.1 08/18/2022 05:50 AM    MCH 29.6 08/18/2022 05:50 AM    MCHC 34.7 08/18/2022 05:50 AM    RDW 14.5 08/18/2022 05:50 AM     08/18/2022 05:50 AM    MPV 9.3 08/18/2022 05:50 AM     PT/INR:  No results found for: PROTIME, INR    Radiology Review:  Xray: x-rays of the left femur were obtained today in the office and reviewed with the patient. 2 views: AP/lateral : demonstrate no acute fractures or dislocations  Impression: no acute fractures or dislocations     IMPRESSION:  Left forearm IV infiltrate  Left thigh pain/swelling/contusion    PLAN:  Discussed findings with the patient at today's visit. Compartments are warm, well perfused, and compressible to the left upper and lower extremity. No signs of compartment syndrome at this time to the left upper and lower extremity. Continue with ice and compression and conservative management. Discussed with Dr. Lavern Funk. All questions answered.

## 2022-08-18 NOTE — PROGRESS NOTES
Dr Lacey Cassidy notified pt still c/o pain left leg,  thigh area is swollen, pedal pulses 2+, limited movement

## 2022-08-18 NOTE — PLAN OF CARE
Called regarding increased pain in left arm. Pain medication increased earlier from 1 mg of morphine to 4 mg of morphine. Patient seen and physically assessed by me. Sleeping and had to be woke up for assessment. CWS intact with 2 + radial pulses bilat. Patient encouraged to keep arm elevated. Will not increase pain medication at this time.

## 2022-08-18 NOTE — PROGRESS NOTES
Patient resting comfortably in bed. Easily awoken. Complains of pain in left arm and leg. No new complaints. Past Medical History:   Diagnosis Date    Femur fracture ([address]      History reviewed. No pertinent surgical history.     Current Facility-Administered Medications:     morphine sulfate (PF) injection 4 mg, 4 mg, IntraVENous, Q4H PRN, KARY Ya - CNP, 4 mg at 22 0524    glucose chewable tablet 16 g, 4 tablet, Oral, PRN, KARY Osborne - CNP    dextrose bolus 10% 125 mL, 125 mL, IntraVENous, PRN **OR** dextrose bolus 10% 250 mL, 250 mL, IntraVENous, PRN, KARY Osborne - CNP    glucagon (rDNA) injection 1 mg, 1 mg, SubCUTAneous, PRN, KARY Osborne - CNP    dextrose 10 % infusion, , IntraVENous, Continuous PRN, KARY Osborne - CNP    sodium chloride flush 0.9 % injection 5-40 mL, 5-40 mL, IntraVENous, 2 times per day, KARY Osborne - CNP, 10 mL at 22 1959    sodium chloride flush 0.9 % injection 5-40 mL, 5-40 mL, IntraVENous, PRN, KARY Osborne - CNP    0.9 % sodium chloride infusion, , IntraVENous, PRN, KARY Osborne - CNP    polyethylene glycol (GLYCOLAX) packet 17 g, 17 g, Oral, Daily PRN, KARY Osborne - CNP    0.9 % sodium chloride infusion, , IntraVENous, Continuous, Gosia Calderon MD, Last Rate: 200 mL/hr at 22 0408, New Bag at 22 0408  No Known Allergies  Social History     Socioeconomic History    Marital status: Single     Spouse name: Not on file    Number of children: Not on file    Years of education: Not on file    Highest education level: Not on file   Occupational History    Not on file   Tobacco Use    Smoking status: Every Day     Packs/day: 0.50     Types: Cigarettes     Last attempt to quit: 2017     Years since quittin.6    Smokeless tobacco: Never   Vaping Use    Vaping Use: Every day   Substance and Sexual Activity    Alcohol use: No    Drug use: Yes     Comment: \"Pills\" unknown pill type    Sexual activity: Not on file   Other Topics Concern    Not on file   Social History Narrative    Not on file     Social Determinants of Health     Financial Resource Strain: Not on file   Food Insecurity: Not on file   Transportation Needs: Not on file   Physical Activity: Not on file   Stress: Not on file   Social Connections: Not on file   Intimate Partner Violence: Not on file   Housing Stability: Not on file     Family History   Problem Relation Age of Onset    Diabetes Mother     Hypertension Mother     Hypertension Father     Diabetes Father     Cancer Brother        Skin: (-) rash,(-) psoriasis,(-) eczema, (-)skin cancer. Musculoskeletal: (-) fractures,  (-) dislocations,(-) collagen vascular disease, (-) fibromyalgia, (-) multiple sclerosis, (-) muscular dystrophy, (-) RSD,(-) joint pain (-)swelling, (-) joint pain,swelling. Neurologic: (-) epilepsy, (-)seizures,(-) brain tumor,(-) TIA, (-)stroke, (-)headaches, (-)Parkinson disease,(-) memory loss, (-) LOC. Cardiovascular: (-) Chest pain, (-) swelling in legs/feet, (-) SOB, (-) cramping in legs/feet with walking. SUBJECTIVE:      Constitutional:    The patient is alert and oriented x 3, appears to be stated age and in no distress. Left UE: Arm soft and compressible. Forearm with fullness proximally near elbow. Soft and compressible distal forearm volar and dorsal. 2+ radial and ulnar pulses at wrist. Able to flex and extend digits with mild discomfort. BCR fingers. Radial, ulnar and median nerves intact to light touch    Xrays:     EXAMINATION:   THREE X-RAY VIEWS OF THE LEFT ELBOW       8/17/2022 7:58 pm       COMPARISON:   None       HISTORY:   ORDERING SYSTEM PROVIDED HISTORY: Pain   TECHNOLOGIST PROVIDED HISTORY:   Reason for Exam:  Pain       FINDINGS:   Alignment: No traumatic malalignment. Osseous: Intact without a definite acute fracture identified.        Joints: No significant arthritic disease is present. Other: There is prominent appearing soft tissue swelling and apparently   linear air anteriorly, advise clinical correlation. Impression   No acute fracture is identified. Prominent soft tissue swelling and apparent air tracking. RECOMMENDATION:   Clinical correlation. MINATION:   TWO XRAY VIEWS OF THE LEFT FOREARM       8/17/2022 7:58 pm       COMPARISON:   None. HISTORY:   ORDERING SYSTEM PROVIDED HISTORY: Pain   TECHNOLOGIST PROVIDED HISTORY:   Reason for exam:->Pain       FINDINGS:   Alignment: No traumatic malalignment. Osseous: Intact without a definite acute fracture identified. Joints: No significant arthritic disease is present. Other: There is prominent appearing soft tissue swelling and apparently   linear air anteriorly, advise clinical correlation for relating to procedure   versus complication. .. Impression   No acute fracture is identified. Prominent soft tissue swelling an apparent air tracking. Results were discussed with the ordering physician. RECOMMENDATION:   Clinical correlation. Radiographic findings reviewed with patient      Impression: Left forearm IV infiltrate      Plan:   X-rays demonstrate air within anterior proximal forearm soft tissues consistent with IV infiltrate. Local care for IV infiltrate. Will continue to monitor compartments.     Electronically signed by Delane Romberg, MD on 8/18/2022 at 6:58 AM

## 2022-08-18 NOTE — PROGRESS NOTES
Paged Dr. Delmar Velarde to see if there are any surgical residents that can place a central line in this patient so we can obtain labs and monitor pt throughout the night. Dr. Margarita Websters us know there is no residents in house to assist with line placement that we can obtain tomorrow.

## 2022-08-18 NOTE — CONSULTS
swollen    LABS:    CBC  Recent Labs     08/17/22  1038   WBC 17.0*   HGB 19.8*   HCT 59.5*        BMP  Recent Labs     08/18/22  0120   *   K 4.6   CL 89*   CO2 23   BUN 40*   CREATININE 5.5*   CALCIUM 7.8*     Liver Function  Recent Labs     08/17/22  1401   BILITOT 0.4   BILIDIR <0.2   AST 3,096*   *   ALKPHOS 76   PROT 6.3*   LABALBU 3.1*     No results for input(s): LACTATE in the last 72 hours. No results for input(s): INR, PTT in the last 72 hours. Invalid input(s): PT    RADIOLOGY    XR PELVIS (1-2 VIEWS)    Result Date: 8/17/2022  EXAMINATION: ONE XRAY VIEW OF THE PELVIS 8/17/2022 10:21 am COMPARISON: None. HISTORY: ORDERING SYSTEM PROVIDED HISTORY: fall TECHNOLOGIST PROVIDED HISTORY: Reason for exam:->fall FINDINGS: No evidence of pelvic fracture. Bilateral hips demonstrate normal alignment. No focal osseous lesion. SI joints are symmetric. No acute abnormality of the pelvis. XR HUMERUS LEFT (MIN 2 VIEWS)    Result Date: 8/17/2022  EXAMINATION: TWO XRAY VIEWS OF THE LEFT HUMERUS 8/17/2022 7:58 pm COMPARISON: None. HISTORY: ORDERING SYSTEM PROVIDED HISTORY: Pain TECHNOLOGIST PROVIDED HISTORY: Reason for exam:->Pain FINDINGS: Alignment: No traumatic malalignment. Osseous: Intact without a definite acute fracture identified. Joints: No significant arthritic disease is present. Other: There is prominent appearing soft tissue swelling and apparently linear air anteriorly, advise clinical correlation. .     No acute fracture is identified. Prominent soft tissue swelling an apparent air tracking in the antecubital region. RECOMMENDATION: Clinical correlation. XR ELBOW LEFT (MIN 3 VIEWS)    Result Date: 8/17/2022  EXAMINATION: THREE X-RAY VIEWS OF THE LEFT ELBOW 8/17/2022 7:58 pm COMPARISON: None HISTORY: ORDERING SYSTEM PROVIDED HISTORY: Pain TECHNOLOGIST PROVIDED HISTORY: Reason for Exam:  Pain FINDINGS: Alignment: No traumatic malalignment.  Osseous: Intact without a definite acute fracture identified. Joints: No significant arthritic disease is present. Other: There is prominent appearing soft tissue swelling and apparently linear air anteriorly, advise clinical correlation. No acute fracture is identified. Prominent soft tissue swelling and apparent air tracking. RECOMMENDATION: Clinical correlation. XR RADIUS ULNA LEFT (2 VIEWS)    Result Date: 8/17/2022  EXAMINATION: TWO XRAY VIEWS OF THE LEFT FOREARM 8/17/2022 7:58 pm COMPARISON: None. HISTORY: ORDERING SYSTEM PROVIDED HISTORY: Pain TECHNOLOGIST PROVIDED HISTORY: Reason for exam:->Pain FINDINGS: Alignment: No traumatic malalignment. Osseous: Intact without a definite acute fracture identified. Joints: No significant arthritic disease is present. Other: There is prominent appearing soft tissue swelling and apparently linear air anteriorly, advise clinical correlation for relating to procedure versus complication. .. No acute fracture is identified. Prominent soft tissue swelling an apparent air tracking. Results were discussed with the ordering physician. RECOMMENDATION: Clinical correlation. CT Head WO Contrast    Result Date: 8/17/2022  EXAMINATION: CT OF THE HEAD WITHOUT CONTRAST  8/17/2022 10:34 am TECHNIQUE: CT of the head was performed without the administration of intravenous contrast. Automated exposure control, iterative reconstruction, and/or weight based adjustment of the mA/kV was utilized to reduce the radiation dose to as low as reasonably achievable. COMPARISON: None. HISTORY: ORDERING SYSTEM PROVIDED HISTORY: fall TECHNOLOGIST PROVIDED HISTORY: Reason for exam:->fall Has a \"code stroke\" or \"stroke alert\" been called? ->No Decision Support Exception - unselect if not a suspected or confirmed emergency medical condition->Emergency Medical Condition (MA) FINDINGS: BRAIN/VENTRICLES: There is no acute intracranial hemorrhage, mass effect or midline shift. No abnormal extra-axial fluid collection.   The gray-white differentiation is maintained without evidence of an acute infarct. There is no evidence of hydrocephalus. ORBITS: The visualized portion of the orbits demonstrate no acute abnormality. SINUSES: The visualized paranasal sinuses and mastoid air cells demonstrate no acute abnormality. SOFT TISSUES/SKULL:  No acute abnormality of the visualized skull or soft tissues. No acute intracranial abnormality. CT Cervical Spine WO Contrast    Result Date: 8/17/2022  EXAMINATION: CT OF THE CERVICAL SPINE WITHOUT CONTRAST 8/17/2022 10:34 am TECHNIQUE: CT of the cervical spine was performed without the administration of intravenous contrast. Multiplanar reformatted images are provided for review. Automated exposure control, iterative reconstruction, and/or weight based adjustment of the mA/kV was utilized to reduce the radiation dose to as low as reasonably achievable. COMPARISON: None. HISTORY: ORDERING SYSTEM PROVIDED HISTORY: fall TECHNOLOGIST PROVIDED HISTORY: Reason for exam:->fall Decision Support Exception - unselect if not a suspected or confirmed emergency medical condition->Emergency Medical Condition (MA) FINDINGS: BONES/ALIGNMENT: There is no acute fracture or traumatic malalignment. There is straightening of lordotic curvature likely due to muscular spasm and/or positioning of the neck. DEGENERATIVE CHANGES: No significant degenerative changes. SOFT TISSUES: There is no prevertebral soft tissue swelling. Lung apices: Small right apical bullae. No acute abnormality of the cervical spine. XR SHOULDER LEFT (MIN 2 VIEWS)    Result Date: 8/17/2022  EXAMINATION: THREE XRAY VIEWS OF THE LEFT SHOULDER 8/17/2022 10:21 am COMPARISON: None. HISTORY: ORDERING SYSTEM PROVIDED HISTORY: fall TECHNOLOGIST PROVIDED HISTORY: Reason for exam:->fall FINDINGS: Glenohumeral joint is normally aligned. No evidence of acute fracture or dislocation. No abnormal periarticular calcifications.   The [facility] joint is unremarkable in appearance. Visualized lung is unremarkable. No acute abnormality. US GALLBLADDER RUQ    Result Date: 8/17/2022  EXAMINATION: RIGHT UPPER QUADRANT ULTRASOUND 8/17/2022 5:35 pm COMPARISON: None. HISTORY: ORDERING SYSTEM PROVIDED HISTORY: shock liver TECHNOLOGIST PROVIDED HISTORY: Reason for exam:->shock liver What reading provider will be dictating this exam?->CRC FINDINGS: LIVER:  The liver demonstrates normal echogenicity without evidence of intrahepatic biliary ductal dilatation. BILIARY SYSTEM:  Gallbladder is unremarkable without evidence of pericholecystic fluid, wall thickening or stones. Negative sonographic Davies's sign. Common bile duct is within normal limits measuring . PANCREAS:  Visualized portions of the pancreas are unremarkable. OTHER: No evidence of right upper quadrant ascites. Unremarkable right upper quadrant ultrasound. US RETROPERITONEAL COMPLETE    Result Date: 8/17/2022  EXAMINATION: RETROPERITONEAL ULTRASOUND OF THE KIDNEYS AND URINARY BLADDER 8/17/2022 COMPARISON: None HISTORY: ORDERING SYSTEM PROVIDED HISTORY: ryne, r/o hydronephrosis TECHNOLOGIST PROVIDED HISTORY: Reason for exam:->ryne, r/o hydronephrosis What reading provider will be dictating this exam?->CRC FINDINGS: Kidneys: The right kidney measures 10.0 cm in length and the left kidney measures 11.6 cm in length. Bilateral echogenic renal cortices. No evidence of hydronephrosis or intrarenal stones. Bladder: Echogenic debris within the floor of the urinary bladder. Bilateral echogenic renal cortices. Echogenic debris within the floor of the urinary bladder. Follow-up recommended.        ASSESSMENT:  42 y.o. male with infiltrated IV and unable to obtain IV adequate access    PLAN:  - Discussed with Dr. Flaca Santillan    Electronically signed by Verna Edge MD on 8/18/22 at 6:31 AM EDT    Attending Physician Statement:    Chief Complaint:   Chief Complaint   Patient presents with    Leg Pain     Left

## 2022-08-18 NOTE — PROGRESS NOTES
[facility] Progress Note    Admitting Date and Time: 8/17/2022  9:25 AM  Admit Dx: Hyperkalemia [E87.5]  Shock liver [K72.00]  Acute kidney injury ([facility] [address] [N17.9]  Fall, initial encounter [W19. XXXA]  Traumatic rhabdomyolysis, initial encounter ([facility] Genevieve Arechiga. 6XXA]  Leukocytosis, unspecified type [D72.829]    Subjective:  Patient is being followed for Hyperkalemia [E87.5]  Shock liver [K72.00]  Acute kidney injury ([facility] [address] [N17.9]  Fall, initial encounter [W19. XXXA]  Traumatic rhabdomyolysis, initial encounter ([facility] Genevieve Maciasevaria. 6XXA]  Leukocytosis, unspecified type [D72.829]   Pt  still feels 10/10 pain left upper and lower extremity. Per RN: No major concerns except worsening left LL swelling, needs IV central access.     ROS: denies fever, chills, cp, sob, n/v, HA unless stated above.      sodium chloride flush  5-40 mL IntraVENous 2 times per day     morphine, 4 mg, Q4H PRN  glucose, 4 tablet, PRN  dextrose bolus, 125 mL, PRN   Or  dextrose bolus, 250 mL, PRN  glucagon (rDNA), 1 mg, PRN  dextrose, , Continuous PRN  sodium chloride flush, 5-40 mL, PRN  sodium chloride, , PRN  polyethylene glycol, 17 g, Daily PRN         Objective:    BP (!) 143/94   Pulse 74   Temp 97.7 °F (36.5 °C) (Oral)   Resp 20   Ht 5' 10\" (1.778 m)   Wt 165 lb (74.8 kg)   SpO2 97%   BMI 23.68 kg/m²     General Appearance: alert and oriented to person, place and time and in mod acute distress from pain  Skin: warm and dry, bruises left face, tattoos  Head: normocephalic and atraumatic  Eyes: pupils equal, round, and reactive to light, extraocular eye movements intact, conjunctivae normal  Neck: neck supple and non tender without mass   Pulmonary/Chest: clear to auscultation bilaterally- no wheezes, rales or rhonchi, normal air movement, no respiratory distress  Cardiovascular: normal rate, normal S1 and S2 and no carotid bruits  Abdomen: soft, non-tender, non-distended, normal bowel sounds, no masses or organomegaly  Extremities: no cyanosis, no clubbing and + edema, swelling, restricted ROM, pain exacerbated by passive stretch left upper and lower extremity, very firm to palpation, paresthesias, numbness+, distal pulses palpable, rt femoral cvc  Neurologic: no cranial nerve deficit and speech normal      Recent Labs     08/17/22  2045 08/18/22  0120 08/18/22  0550   * 129* 132   K 5.2* 4.6 5.0   CL 90* 89* 90*   CO2 26 23 28   BUN 40* 40* 44*   CREATININE 5.0* 5.5* 6.1*   GLUCOSE 146* 180* 139*   CALCIUM 6.8* 7.8* 6.4*       Recent Labs     08/17/22  1038 08/18/22  0550   WBC 17.0* 14.2*   RBC 6.65* 5.38   HGB 19.8* 15.9   HCT 59.5* 45.8   MCV 89.5 85.1   MCH 29.8 29.6   MCHC 33.3 34.7*   RDW 16.0* 14.5    294   MPV 9.4 9.3       Micro:  No components found for: [facility])    Radiology:   XR PELVIS (1-2 VIEWS)    Result Date: 8/17/2022  EXAMINATION: ONE XRAY VIEW OF THE PELVIS 8/17/2022 10:21 am COMPARISON: None. HISTORY: ORDERING SYSTEM PROVIDED HISTORY: fall TECHNOLOGIST PROVIDED HISTORY: Reason for exam:->fall FINDINGS: No evidence of pelvic fracture. Bilateral hips demonstrate normal alignment. No focal osseous lesion. SI joints are symmetric. No acute abnormality of the pelvis. XR HUMERUS LEFT (MIN 2 VIEWS)    Result Date: 8/17/2022  EXAMINATION: TWO XRAY VIEWS OF THE LEFT HUMERUS 8/17/2022 7:58 pm COMPARISON: None. HISTORY: ORDERING SYSTEM PROVIDED HISTORY: Pain TECHNOLOGIST PROVIDED HISTORY: Reason for exam:->Pain FINDINGS: Alignment: No traumatic malalignment. Osseous: Intact without a definite acute fracture identified. Joints: No significant arthritic disease is present. Other: There is prominent appearing soft tissue swelling and apparently linear air anteriorly, advise clinical correlation. .     No acute fracture is identified. Prominent soft tissue swelling an apparent air tracking in the antecubital region. RECOMMENDATION: Clinical correlation.      XR ELBOW LEFT (MIN 3 VIEWS)    Result Date: Decision Support Exception - unselect if not a suspected or confirmed emergency medical condition->Emergency Medical Condition (MA) FINDINGS: BRAIN/VENTRICLES: There is no acute intracranial hemorrhage, mass effect or midline shift. No abnormal extra-axial fluid collection. The gray-white differentiation is maintained without evidence of an acute infarct. There is no evidence of hydrocephalus. ORBITS: The visualized portion of the orbits demonstrate no acute abnormality. SINUSES: The visualized paranasal sinuses and mastoid air cells demonstrate no acute abnormality. SOFT TISSUES/SKULL:  No acute abnormality of the visualized skull or soft tissues. No acute intracranial abnormality. CT Cervical Spine WO Contrast    Result Date: 8/17/2022  EXAMINATION: CT OF THE CERVICAL SPINE WITHOUT CONTRAST 8/17/2022 10:34 am TECHNIQUE: CT of the cervical spine was performed without the administration of intravenous contrast. Multiplanar reformatted images are provided for review. Automated exposure control, iterative reconstruction, and/or weight based adjustment of the mA/kV was utilized to reduce the radiation dose to as low as reasonably achievable. COMPARISON: None. HISTORY: ORDERING SYSTEM PROVIDED HISTORY: fall TECHNOLOGIST PROVIDED HISTORY: Reason for exam:->fall Decision Support Exception - unselect if not a suspected or confirmed emergency medical condition->Emergency Medical Condition (MA) FINDINGS: BONES/ALIGNMENT: There is no acute fracture or traumatic malalignment. There is straightening of lordotic curvature likely due to muscular spasm and/or positioning of the neck. DEGENERATIVE CHANGES: No significant degenerative changes. SOFT TISSUES: There is no prevertebral soft tissue swelling. Lung apices: Small right apical bullae. No acute abnormality of the cervical spine.      XR SHOULDER LEFT (MIN 2 VIEWS)    Result Date: 8/17/2022  EXAMINATION: THREE XRAY VIEWS OF THE LEFT SHOULDER 8/17/2022 10:21 am COMPARISON: None. HISTORY: ORDERING SYSTEM PROVIDED HISTORY: fall TECHNOLOGIST PROVIDED HISTORY: Reason for exam:->fall FINDINGS: Glenohumeral joint is normally aligned. No evidence of acute fracture or dislocation. No abnormal periarticular calcifications. [facility] joint is unremarkable in appearance. Visualized lung is unremarkable. No acute abnormality. US GALLBLADDER RUQ    Result Date: 8/17/2022  EXAMINATION: RIGHT UPPER QUADRANT ULTRASOUND 8/17/2022 5:35 pm COMPARISON: None. HISTORY: ORDERING SYSTEM PROVIDED HISTORY: shock liver TECHNOLOGIST PROVIDED HISTORY: Reason for exam:->shock liver What reading provider will be dictating this exam?->CRC FINDINGS: LIVER:  The liver demonstrates normal echogenicity without evidence of intrahepatic biliary ductal dilatation. BILIARY SYSTEM:  Gallbladder is unremarkable without evidence of pericholecystic fluid, wall thickening or stones. Negative sonographic Davies's sign. Common bile duct is within normal limits measuring . PANCREAS:  Visualized portions of the pancreas are unremarkable. OTHER: No evidence of right upper quadrant ascites. Unremarkable right upper quadrant ultrasound. US RETROPERITONEAL COMPLETE    Result Date: 8/17/2022  EXAMINATION: RETROPERITONEAL ULTRASOUND OF THE KIDNEYS AND URINARY BLADDER 8/17/2022 COMPARISON: None HISTORY: ORDERING SYSTEM PROVIDED HISTORY: ryne, r/o hydronephrosis TECHNOLOGIST PROVIDED HISTORY: Reason for exam:->ryne, r/o hydronephrosis What reading provider will be dictating this exam?->CRC FINDINGS: Kidneys: The right kidney measures 10.0 cm in length and the left kidney measures 11.6 cm in length. Bilateral echogenic renal cortices. No evidence of hydronephrosis or intrarenal stones. Bladder: Echogenic debris within the floor of the urinary bladder. Bilateral echogenic renal cortices. Echogenic debris within the floor of the urinary bladder. Follow-up recommended.        Assessment:    Principal Problem: Hyperkalemia  Active Problems:    Acute kidney injury ([facility] [address]    Fall    IV infiltrate, initial encounter  Resolved Problems:    * No resolved hospital problems. *      Plan:  Traumatic Rhabdomyolysis- questionable mech Fall - Hyperkalemia, acidosis, HELGA- Vigorous hydration, IVFs with bicarb, f/u CK, Nephrology on board, s/p  Ca+Gluc/HCO3/Regular Insulin/Lokelma, EKG with changes related to hyperkalemia. Transaminitis - ? Shock liver vs Drug overdose - utox positive for cocaine, fentanyl, tylenol level wnl, hepatitis panel, hiv, vdrl, INR, USG GB/Liver unremarkable, Ethanol <10, GI consulted. 3. Hyperglycemia - No known DM. Glucose 187 on admission. A1C - 5.9,  lipid panel wnl. Follow, currently on IVFs with dextrose. 4. Possible UTI - leukocytosis, UA abnormal, debris in bladder on US retroperitoneum, f/u urine cx.  5. Incipient compartment synd - Left upper arm and left upper thigh, \"out of proportion pain \"swelling, decreased ROM, paresthesias, numbness, pain exacerbated by passive stretch left upper and lower extremity, very firm to palpation, increased girth relative to contralateral extremity, in the settings of rhabdomyolysis, hyperkalemia, acidosis, though pulses intact with intact motor strength is usually a late finding), consulted ortho for urgent consult, prn analgesics for severe 10/10 pain. Dopplers neg for clot, imaging neg for fractures. Will defer further surgical management including prophylactic fasciotomy to ortho surgeon per his expertise. Rt femoral CVC for vascular access. DVT prophylaxis - SCDs      NOTE: This report was transcribed using voice recognition software. Every effort was made to ensure accuracy; however, inadvertent computerized transcription errors may be present.   Electronically signed by Joshua Schwarz MD on 8/18/2022 at 12:17 PM

## 2022-08-18 NOTE — CONSULTS
to show normal echogenicity without evidence of intrahepatic biliary ductal dilatation. Viral hepatitis panel is pending    Information sources:   -Patient  -medical record  -health care team    PMHx:  Past Medical History:   Diagnosis Date    Femur fracture [facility])        PSHx:History reviewed. No pertinent surgical history.     Meds:  Current Facility-Administered Medications   Medication Dose Route Frequency Provider Last Rate Last Admin    morphine sulfate (PF) injection 4 mg  4 mg IntraVENous Q4H PRN Oral Mars, APRN - CNP   4 mg at 08/18/22 1015    glucose chewable tablet 16 g  4 tablet Oral PRN Saint Franck, APRN - CNP        dextrose bolus 10% 125 mL  125 mL IntraVENous PRN Saint Franck, APRN - CNP        Or    dextrose bolus 10% 250 mL  250 mL IntraVENous PRN Saint Franck, APRN - CNP        glucagon (rDNA) injection 1 mg  1 mg SubCUTAneous PRN Saint Franck, APRN - CNP        dextrose 10 % infusion   IntraVENous Continuous PRN Saint Franck, APRN - CNP        sodium chloride flush 0.9 % injection 5-40 mL  5-40 mL IntraVENous 2 times per day Saint Franck, APRN - CNP   10 mL at 08/17/22 1959    sodium chloride flush 0.9 % injection 5-40 mL  5-40 mL IntraVENous PRN Saint Franck, APRN - CNP        0.9 % sodium chloride infusion   IntraVENous PRN Saint Franck, APRN - CNP        polyethylene glycol (GLYCOLAX) packet 17 g  17 g Oral Daily PRN Saint Franck, APRN - CNP        0.9 % sodium chloride infusion   IntraVENous Continuous Marci Nolasco  mL/hr at 08/18/22 0408 New Bag at 08/18/22 0408        SocHx:  Social History     Socioeconomic History    Marital status: Single     Spouse name: Not on file    Number of children: Not on file    Years of education: Not on file    Highest education level: Not on file   Occupational History    Not on file   Tobacco Use    Smoking status: Every Day     Packs/day: 0.50     Types: Cigarettes     Last attempt to quit: 12/12/2017 Years since quittin.6    Smokeless tobacco: Never   Vaping Use    Vaping Use: Every day   Substance and Sexual Activity    Alcohol use: No    Drug use: Yes     Comment: \"Pills\" unknown pill type    Sexual activity: Not on file   Other Topics Concern    Not on file   Social History Narrative    Not on file     Social Determinants of Health     Financial Resource Strain: Not on file   Food Insecurity: Not on file   Transportation Needs: Not on file   Physical Activity: Not on file   Stress: Not on file   Social Connections: Not on file   Intimate Partner Violence: Not on file   Housing Stability: Not on file       FamHx:  Family History   Problem Relation Age of Onset    Diabetes Mother     Hypertension Mother     Hypertension Father     Diabetes Father     Cancer Brother        Allergy:No Known Allergies      ROS: As described in the HPI and in addition is negative upon detailed review of systems or unobtainable unless otherwise stated in this dictation. PE:  BP (!) 143/94   Pulse 74   Temp 97.7 °F (36.5 °C) (Oral)   Resp 20   Ht 5' 10\" (1.778 m)   Wt 165 lb (74.8 kg)   SpO2 97%   BMI 23.68 kg/m²     Gen.: NAD/-American male. AAOx3  Head: Chronic with some bruising on left eye/eyebrow  Eyes: Anicteric sclera/EOM I  ENT: Moist oral mucosa/no discharge nose ears  Neck: Supple with trachea midline  Chest: CTA B  Cor: Regular  Abd.: Soft/nondistended nontender  Extr.:  No significant peripheral edema  Muscles:  Tone and bulk, consistent with age and condition  Skin: Warm and dry/anicteric/multiple tattoos        DATA:     Lab Results   Component Value Date/Time    WBC 14.2 2022 05:50 AM    RBC 5.38 2022 05:50 AM    HGB 15.9 2022 05:50 AM    HCT 45.8 2022 05:50 AM    MCV 85.1 2022 05:50 AM    MCH 29.6 2022 05:50 AM    MCHC 34.7 2022 05:50 AM    RDW 14.5 2022 05:50 AM     2022 05:50 AM    MPV 9.3 2022 05:50 AM     Lab Results Component Value Date/Time     08/18/2022 05:50 AM    K 5.0 08/18/2022 05:50 AM    K 4.6 08/18/2022 01:20 AM    CL 90 08/18/2022 05:50 AM    CO2 28 08/18/2022 05:50 AM    BUN 44 08/18/2022 05:50 AM    CREATININE 6.1 08/18/2022 05:50 AM    CALCIUM 6.4 08/18/2022 05:50 AM    PROT 5.2 08/18/2022 05:50 AM    LABALBU 2.6 08/18/2022 05:50 AM    BILITOT 0.4 08/18/2022 05:50 AM    ALKPHOS 76 08/18/2022 05:50 AM    AST 2,297 08/18/2022 05:50 AM     08/18/2022 05:50 AM     No results found for: LIPASE  No results found for: AMYLASE      ASSESSMENT/PLAN:  Patient Active Problem List   Diagnosis    Hyperkalemia    Acute kidney injury ([address]    Fall    IV infiltrate, initial encounter     1. Transaminitis  -Likely from muscle origin with possible component of shock liver precipitated by LOC/cocaine  -Monitor liver synthetic function with INR and monitor patient mental status  -Await viral hepatitis serology  -Have low threshold for transfer to tertiary care facility in case of precipitous worsening in liver profile/function and mental status changes    2. Loss of consciousness/substance abuse/rhabdomyolysis/renal failure/pertinent consultants  -Per admitting/pertinent consultants    Thank you for the opportunity to see this patient in consultation    Aretha Lane MD  8/18/2022  1:15 PM    NOTE:  This report was transcribed using voice recognition software. Every effort was made to ensure accuracy; however, inadvertent computerized transcription errors may be present.

## 2022-08-18 NOTE — PROGRESS NOTES
Ir order received for STAT IR PICC placement, unable to place line until tomorrow therefore patient to be transferred to IR Main for placement. Floor RN notified to obtain PT/INR for procedure, maintain NPO status and keep blood thinners on hold for procedure. Main IR notified of PICC placement order.

## 2022-08-18 NOTE — PROGRESS NOTES
Patient continues to complain of increase in pain in left arm and leg after pain medication given. Henrry Hernandez NP notified.

## 2022-08-19 ENCOUNTER — APPOINTMENT (OUTPATIENT)
Dept: CT IMAGING | Age: 37
DRG: 351 | End: 2022-08-19
Payer: MEDICAID

## 2022-08-19 LAB
ALBUMIN SERPL-MCNC: 2.3 G/DL (ref 3.5–5.2)
ALP BLD-CCNC: 60 U/L (ref 40–129)
ALT SERPL-CCNC: 582 U/L (ref 0–40)
ANION GAP SERPL CALCULATED.3IONS-SCNC: 15 MMOL/L (ref 7–16)
AST SERPL-CCNC: 1681 U/L (ref 0–39)
BASOPHILS ABSOLUTE: 0.01 E9/L (ref 0–0.2)
BASOPHILS RELATIVE PERCENT: 0.1 % (ref 0–2)
BILIRUB SERPL-MCNC: 0.4 MG/DL (ref 0–1.2)
BILIRUBIN DIRECT: <0.2 MG/DL (ref 0–0.3)
BILIRUBIN, INDIRECT: NORMAL MG/DL (ref 0–1)
BUN BLDV-MCNC: 54 MG/DL (ref 6–20)
CALCIUM SERPL-MCNC: 6.5 MG/DL (ref 8.6–10.2)
CHLORIDE BLD-SCNC: 93 MMOL/L (ref 98–107)
CO2: 22 MMOL/L (ref 22–29)
CREAT SERPL-MCNC: 8.6 MG/DL (ref 0.7–1.2)
EOSINOPHILS ABSOLUTE: 0.01 E9/L (ref 0.05–0.5)
EOSINOPHILS RELATIVE PERCENT: 0.1 % (ref 0–6)
GFR AFRICAN AMERICAN: 8
GFR NON-AFRICAN AMERICAN: 8 ML/MIN/1.73
GLUCOSE BLD-MCNC: 98 MG/DL (ref 74–99)
HCT VFR BLD CALC: 34.7 % (ref 37–54)
HEMOGLOBIN: 12 G/DL (ref 12.5–16.5)
IMMATURE GRANULOCYTES #: 0.04 E9/L
IMMATURE GRANULOCYTES %: 0.4 % (ref 0–5)
INR BLD: 1
LYMPHOCYTES ABSOLUTE: 1.94 E9/L (ref 1.5–4)
LYMPHOCYTES RELATIVE PERCENT: 18.9 % (ref 20–42)
MAGNESIUM: 2 MG/DL (ref 1.6–2.6)
MCH RBC QN AUTO: 30 PG (ref 26–35)
MCHC RBC AUTO-ENTMCNC: 34.6 % (ref 32–34.5)
MCV RBC AUTO: 86.8 FL (ref 80–99.9)
MONOCYTES ABSOLUTE: 0.8 E9/L (ref 0.1–0.95)
MONOCYTES RELATIVE PERCENT: 7.8 % (ref 2–12)
NEUTROPHILS ABSOLUTE: 7.49 E9/L (ref 1.8–7.3)
NEUTROPHILS RELATIVE PERCENT: 72.7 % (ref 43–80)
PDW BLD-RTO: 14.1 FL (ref 11.5–15)
PHOSPHORUS: 7.1 MG/DL (ref 2.5–4.5)
PLATELET # BLD: 207 E9/L (ref 130–450)
PMV BLD AUTO: 9.6 FL (ref 7–12)
POTASSIUM REFLEX MAGNESIUM: 4.4 MMOL/L (ref 3.5–5)
PROTHROMBIN TIME: 10.9 SEC (ref 9.3–12.4)
RBC # BLD: 4 E12/L (ref 3.8–5.8)
SODIUM BLD-SCNC: 130 MMOL/L (ref 132–146)
TOTAL CK: ABNORMAL U/L (ref 20–200)
TOTAL PROTEIN: 4.8 G/DL (ref 6.4–8.3)
WBC # BLD: 10.3 E9/L (ref 4.5–11.5)

## 2022-08-19 PROCEDURE — 80053 COMPREHEN METABOLIC PANEL: CPT

## 2022-08-19 PROCEDURE — 6360000002 HC RX W HCPCS: Performed by: STUDENT IN AN ORGANIZED HEALTH CARE EDUCATION/TRAINING PROGRAM

## 2022-08-19 PROCEDURE — 85025 COMPLETE CBC W/AUTO DIFF WBC: CPT

## 2022-08-19 PROCEDURE — 2580000003 HC RX 258: Performed by: INTERNAL MEDICINE

## 2022-08-19 PROCEDURE — 85610 PROTHROMBIN TIME: CPT

## 2022-08-19 PROCEDURE — 99232 SBSQ HOSP IP/OBS MODERATE 35: CPT | Performed by: PHYSICIAN ASSISTANT

## 2022-08-19 PROCEDURE — 73700 CT LOWER EXTREMITY W/O DYE: CPT

## 2022-08-19 PROCEDURE — 82550 ASSAY OF CK (CPK): CPT

## 2022-08-19 PROCEDURE — 36415 COLL VENOUS BLD VENIPUNCTURE: CPT

## 2022-08-19 PROCEDURE — 83735 ASSAY OF MAGNESIUM: CPT

## 2022-08-19 PROCEDURE — 6360000002 HC RX W HCPCS: Performed by: NURSE PRACTITIONER

## 2022-08-19 PROCEDURE — 80076 HEPATIC FUNCTION PANEL: CPT

## 2022-08-19 PROCEDURE — 84100 ASSAY OF PHOSPHORUS: CPT

## 2022-08-19 PROCEDURE — 99233 SBSQ HOSP IP/OBS HIGH 50: CPT | Performed by: STUDENT IN AN ORGANIZED HEALTH CARE EDUCATION/TRAINING PROGRAM

## 2022-08-19 PROCEDURE — 2580000003 HC RX 258: Performed by: NURSE PRACTITIONER

## 2022-08-19 PROCEDURE — 2060000000 HC ICU INTERMEDIATE R&B

## 2022-08-19 RX ORDER — MORPHINE SULFATE 4 MG/ML
4 INJECTION, SOLUTION INTRAMUSCULAR; INTRAVENOUS EVERY 6 HOURS PRN
Status: DISCONTINUED | OUTPATIENT
Start: 2022-08-19 | End: 2022-08-21

## 2022-08-19 RX ADMIN — Medication 100 UNITS: at 06:17

## 2022-08-19 RX ADMIN — MORPHINE SULFATE 4 MG: 4 INJECTION, SOLUTION INTRAMUSCULAR; INTRAVENOUS at 00:01

## 2022-08-19 RX ADMIN — MORPHINE SULFATE 4 MG: 4 INJECTION, SOLUTION INTRAMUSCULAR; INTRAVENOUS at 15:53

## 2022-08-19 RX ADMIN — SODIUM CHLORIDE: 9 INJECTION, SOLUTION INTRAVENOUS at 13:46

## 2022-08-19 RX ADMIN — MORPHINE SULFATE 4 MG: 4 INJECTION, SOLUTION INTRAMUSCULAR; INTRAVENOUS at 10:10

## 2022-08-19 RX ADMIN — SODIUM CHLORIDE: 9 INJECTION, SOLUTION INTRAVENOUS at 21:02

## 2022-08-19 RX ADMIN — MORPHINE SULFATE 4 MG: 4 INJECTION, SOLUTION INTRAMUSCULAR; INTRAVENOUS at 22:22

## 2022-08-19 RX ADMIN — MORPHINE SULFATE 4 MG: 4 INJECTION, SOLUTION INTRAMUSCULAR; INTRAVENOUS at 06:10

## 2022-08-19 RX ADMIN — SODIUM CHLORIDE: 9 INJECTION, SOLUTION INTRAVENOUS at 04:03

## 2022-08-19 RX ADMIN — Medication 10 ML: at 22:32

## 2022-08-19 ASSESSMENT — PAIN DESCRIPTION - DESCRIPTORS
DESCRIPTORS: SHARP;SHOOTING
DESCRIPTORS: ACHING
DESCRIPTORS: SHOOTING;SHARP

## 2022-08-19 ASSESSMENT — PAIN SCALES - GENERAL
PAINLEVEL_OUTOF10: 10

## 2022-08-19 ASSESSMENT — PAIN DESCRIPTION - LOCATION
LOCATION: LEG;ARM
LOCATION: LEG;ARM;HIP
LOCATION: ARM;LEG
LOCATION: LEG

## 2022-08-19 ASSESSMENT — PAIN DESCRIPTION - ORIENTATION
ORIENTATION: LEFT

## 2022-08-19 ASSESSMENT — PAIN - FUNCTIONAL ASSESSMENT
PAIN_FUNCTIONAL_ASSESSMENT: PREVENTS OR INTERFERES SOME ACTIVE ACTIVITIES AND ADLS
PAIN_FUNCTIONAL_ASSESSMENT: PREVENTS OR INTERFERES SOME ACTIVE ACTIVITIES AND ADLS

## 2022-08-19 NOTE — PLAN OF CARE
Problem: Pain  Goal: Verbalizes/displays adequate comfort level or baseline comfort level  8/19/2022 1059 by Jefry Jones RN  Outcome: Progressing     Problem: Safety - Adult  Goal: Free from fall injury  8/19/2022 1059 by Jefry Jones RN  Outcome: Progressing     Problem: Skin/Tissue Integrity  Goal: Absence of new skin breakdown  Description: 1. Monitor for areas of redness and/or skin breakdown  2. Assess vascular access sites hourly  3. Every 4-6 hours minimum:  Change oxygen saturation probe site  4. Every 4-6 hours:  If on nasal continuous positive airway pressure, respiratory therapy assess nares and determine need for appliance change or resting period.   8/19/2022 1059 by Jefry Jones RN  Outcome: Progressing

## 2022-08-19 NOTE — PROGRESS NOTES
Chief Complaint   Patient presents with    Leg Pain     Left leg numbness. Extremity Weakness     Numbess to left shoulder and hand    Fall     Last night, stated he passed out. Denver Plump is a 42y.o. year old who is admitted for rhabdomyolysis. Patient seen yesterday for left arm and left swelling. He does report this morning that his pain is still present but is improved from yesterday. He reports \"numbness\" to his lateral thigh but when questioned further he starts this feels more like \"fullness\" to his thigh. He states the same with his left upper arm. He has no numbness or tingling to his left upper or lower extremity upon detailed questioning. Past Medical History:   Diagnosis Date    Femur fracture ([facility] [address]      History reviewed. No pertinent surgical history.     Current Facility-Administered Medications:     morphine sulfate (PF) injection 4 mg, 4 mg, IntraVENous, Q4H PRN, Spencer Albright, APRN - CNP, 4 mg at 08/19/22 0610    heparin flush 100 UNIT/ML injection 100 Units, 100 Units, IntraCATHeter, PRN, Jabari Salguero MD, 100 Units at 08/19/22 0617    glucose chewable tablet 16 g, 4 tablet, Oral, PRN, Poli Viet, APRN - CNP    dextrose bolus 10% 125 mL, 125 mL, IntraVENous, PRN **OR** dextrose bolus 10% 250 mL, 250 mL, IntraVENous, PRN, Poli Viet, APRN - CNP    glucagon (rDNA) injection 1 mg, 1 mg, SubCUTAneous, PRN, Poli Viet, APRN - CNP    dextrose 10 % infusion, , IntraVENous, Continuous PRN, Poli Viet, APRN - CNP    sodium chloride flush 0.9 % injection 5-40 mL, 5-40 mL, IntraVENous, 2 times per day, Poli Viet, APRN - CNP, 10 mL at 08/18/22 1449    sodium chloride flush 0.9 % injection 5-40 mL, 5-40 mL, IntraVENous, PRN, Poli Viet, APRN - CNP    0.9 % sodium chloride infusion, , IntraVENous, PRN, Poli Viet, APRN - CNP    polyethylene glycol (GLYCOLAX) packet 17 g, 17 g, Oral, Daily PRN, Poli Viet, APRN - CNP    0.9 % sodium chloride infusion, , IntraVENous, Continuous, Lex Cline MD, Last Rate: 200 mL/hr at 22 0403, New Bag at 22 0403  No Known Allergies  Social History     Socioeconomic History    Marital status: Single     Spouse name: Not on file    Number of children: Not on file    Years of education: Not on file    Highest education level: Not on file   Occupational History    Not on file   Tobacco Use    Smoking status: Every Day     Packs/day: 0.50     Types: Cigarettes     Last attempt to quit: 2017     Years since quittin.6    Smokeless tobacco: Never   Vaping Use    Vaping Use: Every day   Substance and Sexual Activity    Alcohol use: No    Drug use: Yes     Comment: \"Pills\" unknown pill type    Sexual activity: Not on file   Other Topics Concern    Not on file   Social History Narrative    Not on file     Social Determinants of Health     Financial Resource Strain: Not on file   Food Insecurity: Not on file   Transportation Needs: Not on file   Physical Activity: Not on file   Stress: Not on file   Social Connections: Not on file   Intimate Partner Violence: Not on file   Housing Stability: Not on file     Family History   Problem Relation Age of Onset    Diabetes Mother     Hypertension Mother     Hypertension Father     Diabetes Father     Cancer Brother        Skin: (-) rash,(-) psoriasis,(-) eczema, (-)skin cancer. Musculoskeletal: left arm and leg pain  Neurologic: (-) epilepsy, (-)seizures,(-) brain tumor,(-) TIA, (-)stroke, (-)headaches, (-)Parkinson disease,(-) memory loss, (-) LOC. Cardiovascular: (-) Chest pain, (-) swelling in legs/feet, (-) SOB    SUBJECTIVE:      Constitutional:    The patient is alert and oriented x 3, appears to be stated age and in no distress. Left upper extremity: skin intact. Left shoulder with mild erythema to the lateral portion of his shoulder. Left upper extremity swollen including hand and fingers. Compartments are firm but compressible.  Forearm with fullness proximally near elbow. Firm and compressible distal forearm volar and dorsal. 2+ radial and ulnar pulses at wrist. Able to flex and extend digits, wrist, and elbow with mild discomfort. Left upper extremity warm and well perfused. BCR fingers. Radial, ulnar and median nerves intact to light touch. No numbness. - wartenbergs and cross finger testing. Left lower extremity: skin is intact. No erythema or signs of infection. Thigh swollen with compartments that are firm but compressible with most discomfort to the lateral thigh. Some discomfort with passive and active knee flexion and extension and hip internal and external rotation. 2+ DP/PT pulses. No pain to the knee, calf, or ankle. Distal sensory intact with no numbness. Extremity warm and well perfused. Brisk capillary refill. Impression:   Left upper extremity swelling and pain with left forearm IV infiltrate  Left thigh swelling/pain  Rhabdomyolysis    Plan:   Discussed findings with the patient at today's visit. Compartments are warm, well perfused, and remain compressible to the left upper and lower extremity. Patient does report that his pain is still present but has improved when compared to his pain yesterday. Continue with ice and compression and conservative management. Discussed in detail with Dr. Terrell Ferro. Will continue to monitor. All questions answered.

## 2022-08-19 NOTE — PROGRESS NOTES
[facility] MD Florentino Greenwood, MD Liban Arroyo, MD Dariel Latif, CNP   Linda Anguiano, ANP  Progress Note    8/18/2022    SUBJECTIVE:   8/18: Complaining of pain in his left upper and lower extremities. Tingling in both extremities. Swelling, though in the left upper extremity due to infiltrated IV. (-) sob on room air. No cp/palp Appetite poor      PROBLEM LIST:    Principal Problem:    Hyperkalemia  Active Problems:    Acute kidney injury ([address]    Fall    IV infiltrate, initial encounter  Resolved Problems:    * No resolved hospital problems. *         DIET:    ADULT DIET;  Regular     MEDS (scheduled):    sodium chloride flush  5-40 mL IntraVENous 2 times per day       MEDS (infusions):   dextrose      sodium chloride      sodium chloride 200 mL/hr at 08/18/22 2307       MEDS (prn):  morphine, heparin flush, glucose, dextrose bolus **OR** dextrose bolus, glucagon (rDNA), dextrose, sodium chloride flush, sodium chloride, polyethylene glycol    PHYSICAL EXAM:     Patient Vitals for the past 24 hrs:   BP Temp Temp src Pulse Resp SpO2   08/18/22 1945 (!) 143/88 99 °F (37.2 °C) Oral 77 18 98 %   08/18/22 1600 (!) 142/76 98.2 °F (36.8 °C) Axillary 67 16 100 %   08/18/22 0706 (!) 143/94 97.7 °F (36.5 °C) Oral 74 20 97 %   08/18/22 0515 (!) 132/91 98.5 °F (36.9 °C) Oral 79 22 97 %   08/18/22 0100 134/87 97.7 °F (36.5 °C) Oral 80 20 98 %   @      Intake/Output Summary (Last 24 hours) at 8/18/2022 2343  Last data filed at 8/18/2022 1958  Gross per 24 hour   Intake 675 ml   Output 410 ml   Net 265 ml         Wt Readings from Last 3 Encounters:   08/17/22 165 lb (74.8 kg)   04/30/18 145 lb (65.8 kg)   12/14/17 155 lb (70.3 kg)       Constitutional:  in no acute distress  HEENT: NC/AT, EOMI, sclera and conjunctiva are clear and anicteric, mucus membranes moist  Neck: Trachea midline, no JVD  Cardiovascular: S1, S2 regular rhythm, no murmur,or rub  Respiratory:  No crackles, no wheeze  Gastrointestinal:  Soft, nontender, nondistended, NABS  Ext: Left lower extremity edema, mild, though otherwise no edema, feet warm  Skin: dry, no rash  Neuro: awake, alert, interactive. Moves all 4 extremities      DATA:    Recent Labs     08/17/22  1038 08/18/22  0550   WBC 17.0* 14.2*   HGB 19.8* 15.9   HCT 59.5* 45.8   MCV 89.5 85.1    294     Recent Labs     08/17/22  1038 08/17/22  1038 08/17/22  1401 08/17/22  1541 08/17/22  2045 08/18/22  0120 08/18/22  0550 08/18/22  1800     --  131*  --  130* 129* 132 129*   K 8.3*   < > 5.6*   < > 5.2* 4.6 5.0 4.7   CL 94*  --  97*  --  90* 89* 90* 92*   CO2 20*  --  18*  --  26 23 28 23   MG  --   --   --   --  2.3  --  2.0  --    PHOS  --   --   --   --   --   --  6.5*  --    BUN 32*  --  35*  --  40* 40* 44* 48*   CREATININE 4.3*  --  4.4*  --  5.0* 5.5* 6.1* 7.5*   ALT 1,005*  --  903*  --   --   --  743*  --    AST 3,665*  --  3,096*  --   --   --  2,297*  --    BILIDIR  --   --  <0.2  --   --   --   --   --    BILITOT 0.4  --  0.4  --   --   --  0.4  --    ALKPHOS 90  --  76  --   --   --  76  --     < > = values in this interval not displayed. Lab Results   Component Value Date    LABPROT 2.0 (H) 08/17/2022    LABPROT 2.0 08/17/2022       ASSESSMENT / RECOMMENDATIONS:    1. Acute kidney injury, severe, oliguric secondary to rhabdomyolysis, itself severe    2. Rhabdomyolysis, severe, total CK this morning 155,080. Does not appear to have compartment syndrome in either left upper or lower extremity, though both are quite sore, and he complains of tingling. 3.  Hyperkalemia due to HELGA, and rhabdomyolysis. 4.  Hyperphosphatemia, due to rhabdomyolysis, HELGA    5.   Transaminitis, improving    Continue IV fluid resuscitation with normal saline at 200 cc/h  Acute hemodialysis not warranted at this point, though if azotemia continues to progress, may be looking at that need in the near future  Follow labs, UO  Continue supportive care      Electronically signed by Marcelle Ko MD on 8/18/2022

## 2022-08-19 NOTE — PROGRESS NOTES
[facility] Progress Note    Admitting Date and Time: 8/17/2022  9:25 AM  Admit Dx: Hyperkalemia [E87.5]  Shock liver [K72.00]  Acute kidney injury ([address] [N17.9]  Fall, initial encounter [W19. XXXA]  Traumatic rhabdomyolysis, initial encounter ([address] Hershal Brine. 6XXA]  Leukocytosis, unspecified type [D72.829]    Subjective:  Patient is being followed for Hyperkalemia [E87.5]  Shock liver [K72.00]  Acute kidney injury ([address] [N17.9]  Fall, initial encounter [W19. XXXA]  Traumatic rhabdomyolysis, initial encounter ([facility] [address] Hershal Brine. 6XXA]  Leukocytosis, unspecified type [D72.829]   Pt  reports his pain left upper and lower extremity slightly improved but still requiring morphine 4 mg q4hrs, will try wean to q 6hrs. Per RN: No major concerns.     ROS: denies fever, chills, cp, sob, n/v, HA unless stated above.      sodium chloride flush  5-40 mL IntraVENous 2 times per day     morphine, 4 mg, Q6H PRN  heparin flush, 100 Units, PRN  glucose, 4 tablet, PRN  dextrose bolus, 125 mL, PRN   Or  dextrose bolus, 250 mL, PRN  glucagon (rDNA), 1 mg, PRN  dextrose, , Continuous PRN  sodium chloride flush, 5-40 mL, PRN  sodium chloride, , PRN  polyethylene glycol, 17 g, Daily PRN       Objective:    /86   Pulse 82   Temp 98.1 °F (36.7 °C) (Oral)   Resp 18   Ht 5' 10\" (1.778 m)   Wt 189 lb 11.2 oz (86 kg)   SpO2 100%   BMI 27.22 kg/m²     General Appearance: alert and oriented to person, place and time and in mod acute distress from pain  Skin: warm and dry, bruises left face, tattoos  Head: normocephalic and atraumatic  Eyes: pupils equal, round, and reactive to light, extraocular eye movements intact, conjunctivae normal  Neck: neck supple and non tender without mass   Pulmonary/Chest: clear to auscultation bilaterally- no wheezes, rales or rhonchi, normal air movement, no respiratory distress  Cardiovascular: normal rate, normal S1 and S2 and no carotid bruits  Abdomen: soft, non-tender, non-distended, normal bowel sounds, no masses or organomegaly  Extremities: no cyanosis, no clubbing and + edema, swelling, restricted ROM, pain exacerbated by passive stretch left upper and lower extremity, firm to palpation, paresthesias, numbness+, distal pulses palpable, rt femoral cvc  Neurologic: no cranial nerve deficit and speech normal      Recent Labs     08/18/22  0550 08/18/22  1800 08/19/22  0545    129* 130*   K 5.0 4.7 4.4   CL 90* 92* 93*   CO2 28 23 22   BUN 44* 48* 54*   CREATININE 6.1* 7.5* 8.6*   GLUCOSE 139* 110* 98   CALCIUM 6.4* 6.3* 6.5*       Recent Labs     08/17/22  1038 08/18/22  0550 08/19/22  0545   WBC 17.0* 14.2* 10.3   RBC 6.65* 5.38 4.00   HGB 19.8* 15.9 12.0*   HCT 59.5* 45.8 34.7*   MCV 89.5 85.1 86.8   MCH 29.8 29.6 30.0   MCHC 33.3 34.7* 34.6*   RDW 16.0* 14.5 14.1    294 207   MPV 9.4 9.3 9.6       Micro:  No components found for: [facility])    Radiology:   XR PELVIS (1-2 VIEWS)    Result Date: 8/17/2022  EXAMINATION: ONE XRAY VIEW OF THE PELVIS 8/17/2022 10:21 am COMPARISON: None. HISTORY: ORDERING SYSTEM PROVIDED HISTORY: fall TECHNOLOGIST PROVIDED HISTORY: Reason for exam:->fall FINDINGS: No evidence of pelvic fracture. Bilateral hips demonstrate normal alignment. No focal osseous lesion. SI joints are symmetric. No acute abnormality of the pelvis. XR HUMERUS LEFT (MIN 2 VIEWS)    Result Date: 8/17/2022  EXAMINATION: TWO XRAY VIEWS OF THE LEFT HUMERUS 8/17/2022 7:58 pm COMPARISON: None. HISTORY: ORDERING SYSTEM PROVIDED HISTORY: Pain TECHNOLOGIST PROVIDED HISTORY: Reason for exam:->Pain FINDINGS: Alignment: No traumatic malalignment. Osseous: Intact without a definite acute fracture identified. Joints: No significant arthritic disease is present. Other: There is prominent appearing soft tissue swelling and apparently linear air anteriorly, advise clinical correlation. .     No acute fracture is identified.  Prominent soft tissue swelling an apparent air tracking in the antecubital HISTORY: Reason for exam:->fall Has a \"code stroke\" or \"stroke alert\" been called? ->No Decision Support Exception - unselect if not a suspected or confirmed emergency medical condition->Emergency Medical Condition (MA) FINDINGS: BRAIN/VENTRICLES: There is no acute intracranial hemorrhage, mass effect or midline shift. No abnormal extra-axial fluid collection. The gray-white differentiation is maintained without evidence of an acute infarct. There is no evidence of hydrocephalus. ORBITS: The visualized portion of the orbits demonstrate no acute abnormality. SINUSES: The visualized paranasal sinuses and mastoid air cells demonstrate no acute abnormality. SOFT TISSUES/SKULL:  No acute abnormality of the visualized skull or soft tissues. No acute intracranial abnormality. CT Cervical Spine WO Contrast    Result Date: 8/17/2022  EXAMINATION: CT OF THE CERVICAL SPINE WITHOUT CONTRAST 8/17/2022 10:34 am TECHNIQUE: CT of the cervical spine was performed without the administration of intravenous contrast. Multiplanar reformatted images are provided for review. Automated exposure control, iterative reconstruction, and/or weight based adjustment of the mA/kV was utilized to reduce the radiation dose to as low as reasonably achievable. COMPARISON: None. HISTORY: ORDERING SYSTEM PROVIDED HISTORY: fall TECHNOLOGIST PROVIDED HISTORY: Reason for exam:->fall Decision Support Exception - unselect if not a suspected or confirmed emergency medical condition->Emergency Medical Condition (MA) FINDINGS: BONES/ALIGNMENT: There is no acute fracture or traumatic malalignment. There is straightening of lordotic curvature likely due to muscular spasm and/or positioning of the neck. DEGENERATIVE CHANGES: No significant degenerative changes. SOFT TISSUES: There is no prevertebral soft tissue swelling. Lung apices: Small right apical bullae. No acute abnormality of the cervical spine.      XR SHOULDER LEFT (MIN 2 VIEWS)    Result Date: 8/17/2022  EXAMINATION: THREE XRAY VIEWS OF THE LEFT SHOULDER 8/17/2022 10:21 am COMPARISON: None. HISTORY: ORDERING SYSTEM PROVIDED HISTORY: fall TECHNOLOGIST PROVIDED HISTORY: Reason for exam:->fall FINDINGS: Glenohumeral joint is normally aligned. No evidence of acute fracture or dislocation. No abnormal periarticular calcifications. The [facility] joint is unremarkable in appearance. Visualized lung is unremarkable. No acute abnormality. US GALLBLADDER RUQ    Result Date: 8/17/2022  EXAMINATION: RIGHT UPPER QUADRANT ULTRASOUND 8/17/2022 5:35 pm COMPARISON: None. HISTORY: ORDERING SYSTEM PROVIDED HISTORY: shock liver TECHNOLOGIST PROVIDED HISTORY: Reason for exam:->shock liver What reading provider will be dictating this exam?->CRC FINDINGS: LIVER:  The liver demonstrates normal echogenicity without evidence of intrahepatic biliary ductal dilatation. BILIARY SYSTEM:  Gallbladder is unremarkable without evidence of pericholecystic fluid, wall thickening or stones. Negative sonographic Davies's sign. Common bile duct is within normal limits measuring . PANCREAS:  Visualized portions of the pancreas are unremarkable. OTHER: No evidence of right upper quadrant ascites. Unremarkable right upper quadrant ultrasound. US RETROPERITONEAL COMPLETE    Result Date: 8/17/2022  EXAMINATION: RETROPERITONEAL ULTRASOUND OF THE KIDNEYS AND URINARY BLADDER 8/17/2022 COMPARISON: None HISTORY: ORDERING SYSTEM PROVIDED HISTORY: ryne, r/o hydronephrosis TECHNOLOGIST PROVIDED HISTORY: Reason for exam:->ryne, r/o hydronephrosis What reading provider will be dictating this exam?->CRC FINDINGS: Kidneys: The right kidney measures 10.0 cm in length and the left kidney measures 11.6 cm in length. Bilateral echogenic renal cortices. No evidence of hydronephrosis or intrarenal stones. Bladder: Echogenic debris within the floor of the urinary bladder. Bilateral echogenic renal cortices.  Echogenic debris within the floor of the urinary bladder. Follow-up recommended. Assessment:    Principal Problem:    Hyperkalemia  Active Problems:    Acute kidney injury ([address]    Fall    IV infiltrate, initial encounter  Resolved Problems:    * No resolved hospital problems. *      Plan:  Traumatic Rhabdomyolysis- questionable mech Fall - Hyperkalemia, acidosis, HELGA worsening- Vigorous hydration, IVFs with bicarb, f/u CK, Nephrology on board, s/p  Ca+Gluc/HCO3/Regular Insulin/Lokelma, EKG with changes related to hyperkalemia. Transaminitis - ? Shock liver vs Drug overdose - utox positive for cocaine, fentanyl, tylenol level wnl, hepatitis panel, hiv, vdrl, INR, USG GB/Liver unremarkable, Ethanol <10, GI consulted. 3. Hyperglycemia - No known DM. Glucose 187 on admission. A1C - 5.9,  lipid panel wnl. Follow, currently on IVFs with dextrose. 4. Possible UTI - leukocytosis, UA abnormal, debris in bladder on US retroperitoneum, f/u urine cx.  5.  Contusions/ myalgias vs  Incipient compartment synd  ( ruled out per ortho)- - Left upper arm and left upper thigh, \"out of proportion pain \"swelling, decreased ROM, paresthesias, numbness, pain exacerbated by passive stretch left upper and lower extremity, very firm to palpation, increased girth relative to contralateral extremity, in the settings of rhabdomyolysis, hyperkalemia, acidosis, though pulses intact with intact motor strength is usually a late finding), consulted ortho for urgent consult, prn analgesics. Dopplers neg for clot, imaging neg for fractures. Will defer further surgical management including prophylactic fasciotomy to ortho surgeon per his expertise. Since pain improving will wean morphine 4 mg q4hrs to q 6hrs. Rt femoral CVC for vascular access. DVT prophylaxis - SCDs      NOTE: This report was transcribed using voice recognition software. Every effort was made to ensure accuracy; however, inadvertent computerized transcription errors may be present.   Electronically signed by Felipe Mims MD on 8/19/2022 at 1:54 PM

## 2022-08-19 NOTE — PROGRESS NOTES
PROGRESS NOTE  By Lencho Copeland M.D. [facility]  Dr. Kamar So M.D.,  Dr. Tate Sweet M.D.,   Dr. Aubrey Emery D.O.,  Dr. Fermin Martell M.D.,  Dr. Tate Hazel D.O.,  [facility], D.O. [place] Plump  42 y.o.  male    SUBJECTIVE:  Denies abdominal pain. Denies nausea vomiting. Reports to be tolerating diet but no bowel movements yet    OBJECTIVE:    /86   Pulse 82   Temp 98.1 °F (36.7 °C) (Oral)   Resp 18   Ht 5' 10\" (1.778 m)   Wt 189 lb 11.2 oz (86 kg)   SpO2 100%   BMI 27.22 kg/m²     General: NAD/-American male. HEENT: Anicteric sclera/moist oral mucosa  Neck: Appears supple with trachea midline  Chest: Symmetric excursion/nonlabored respirations  Cor: Regular  Abd.: Soft and nondistended  Extr.:  No peripheral edema  Skin: Warm and dry      DATA:    Monitor data reviewed -sinus rhythm noted. Lab Results   Component Value Date/Time    WBC 10.3 08/19/2022 05:45 AM    RBC 4.00 08/19/2022 05:45 AM    HGB 12.0 08/19/2022 05:45 AM    HCT 34.7 08/19/2022 05:45 AM    MCV 86.8 08/19/2022 05:45 AM    MCH 30.0 08/19/2022 05:45 AM    MCHC 34.6 08/19/2022 05:45 AM    RDW 14.1 08/19/2022 05:45 AM     08/19/2022 05:45 AM    MPV 9.6 08/19/2022 05:45 AM     Lab Results   Component Value Date/Time     08/19/2022 05:45 AM    K 4.4 08/19/2022 05:45 AM    CL 93 08/19/2022 05:45 AM    CO2 22 08/19/2022 05:45 AM    BUN 54 08/19/2022 05:45 AM    CREATININE 8.6 08/19/2022 05:45 AM    CALCIUM 6.5 08/19/2022 05:45 AM    PROT 4.8 08/19/2022 05:45 AM    LABALBU 2.3 08/19/2022 05:45 AM    BILITOT 0.4 08/19/2022 05:45 AM    ALKPHOS 60 08/19/2022 05:45 AM    AST 1,681 08/19/2022 05:45 AM     08/19/2022 05:45 AM     No results found for: LIPASE  No results found for: AMYLASE      ASSESSMENT/PLAN:  Patient Active Problem List   Diagnosis    Hyperkalemia    Acute kidney injury ([facility] [address]    Fall    IV infiltrate, initial encounter     1.   Transaminitis  -Likely from muscle origin with possible component of shock liver precipitated by LOC/cocaine  -Monitor liver synthetic function with INR and monitor patient mental status  -Await viral hepatitis serology  -Have low threshold for transfer to tertiary care facility in case of precipitous worsening in liver profile/function and mental status changes     2. Loss of consciousness/substance abuse/rhabdomyolysis/renal failure/pertinent consultants  -Per admitting/pertinent consultants     Jeremy Lorenzo MD  8/19/2022  1:46 PM    NOTE:  This report was transcribed using voice recognition software. Every effort was made to ensure accuracy; however, inadvertent computerized transcription errors may be present.

## 2022-08-19 NOTE — PROGRESS NOTES
Patient ambulating with walking in room. Reports left arm is much better. Still complaining of left thigh pain. Past Medical History:   Diagnosis Date    Femur fracture ([address]      History reviewed. No pertinent surgical history.     Current Facility-Administered Medications:     morphine sulfate (PF) injection 4 mg, 4 mg, IntraVENous, Q6H PRN, Lisa Anderson MD    heparin flush 100 UNIT/ML injection 100 Units, 100 Units, IntraCATHeter, PRN, Lisa Anderson MD, 100 Units at 22 0617    glucose chewable tablet 16 g, 4 tablet, Oral, PRN, Jeremias Thurmond, APRN - CNP    dextrose bolus 10% 125 mL, 125 mL, IntraVENous, PRN **OR** dextrose bolus 10% 250 mL, 250 mL, IntraVENous, PRN, Jeremias Thurmond, APRN - CNP    glucagon (rDNA) injection 1 mg, 1 mg, SubCUTAneous, PRN, Jeremias Reva, APRN - CNP    dextrose 10 % infusion, , IntraVENous, Continuous PRN, Jeremias Thurmond, APRN - CNP    sodium chloride flush 0.9 % injection 5-40 mL, 5-40 mL, IntraVENous, 2 times per day, Jeremisa Thurmond, APRN - CNP, 10 mL at 22 1449    sodium chloride flush 0.9 % injection 5-40 mL, 5-40 mL, IntraVENous, PRN, Jeremias Thurmond, APRN - CNP    0.9 % sodium chloride infusion, , IntraVENous, PRN, Jeremias Thurmond, APRN - CNP    polyethylene glycol (GLYCOLAX) packet 17 g, 17 g, Oral, Daily PRN, Jeremias Thurmond, APRN - CNP    0.9 % sodium chloride infusion, , IntraVENous, Continuous, Marimar Wilson MD, Last Rate: 200 mL/hr at 22 0403, New Bag at 22 0403  No Known Allergies  Social History     Socioeconomic History    Marital status: Single     Spouse name: Not on file    Number of children: Not on file    Years of education: Not on file    Highest education level: Not on file   Occupational History    Not on file   Tobacco Use    Smoking status: Every Day     Packs/day: 0.50     Types: Cigarettes     Last attempt to quit: 2017     Years since quittin.6    Smokeless tobacco: Never   Vaping Use    Vaping Use: Every day   Substance and Sexual Activity    Alcohol use: No    Drug use: Yes     Comment: \"Pills\" unknown pill type    Sexual activity: Not on file   Other Topics Concern    Not on file   Social History Narrative    Not on file     Social Determinants of Health     Financial Resource Strain: Not on file   Food Insecurity: Not on file   Transportation Needs: Not on file   Physical Activity: Not on file   Stress: Not on file   Social Connections: Not on file   Intimate Partner Violence: Not on file   Housing Stability: Not on file     Family History   Problem Relation Age of Onset    Diabetes Mother     Hypertension Mother     Hypertension Father     Diabetes Father     Cancer Brother        Skin: (-) rash,(-) psoriasis,(-) eczema, (-)skin cancer. Musculoskeletal: left thigh pain  Neurologic: (-) epilepsy, (-)seizures,(-) brain tumor,(-) TIA, (-)stroke, (-)headaches, (-)Parkinson disease,(-) memory loss, (-) LOC. Cardiovascular: (-) Chest pain, (-) swelling in legs/feet, (-) SOB, (-) cramping in legs/feet with walking. SUBJECTIVE:      Constitutional:    The patient is alert and oriented x 3, appears to be stated age and in no distress. Left UE: all compartments soft and compressible. FROM of shoulder, elbow and hand. Radial, ulnar, median nerves intact. Motor 5/5. 2+ radial pulse with BCR all fingers    Left LE: Focal swelling anterior and lateral thigh. Knee flexion 90 degrees with moderate discomfort. Able to ambulate on left LE. Compartments compressible anterior, medial and posterior. DP/PT pulses palpable. GS/TA/EHL 5/5. Impression: Resolving left arm IV infiltrate  Left thigh hematoma/focal swelling. Compartments remain compressible.      Plan:     CT scan left thigh  NWB/Elevation/ICE to left thigh   Pain control    Electronically signed by Yehuda Westbrook MD on 8/19/2022 at 1:10 PM

## 2022-08-19 NOTE — CARE COORDINATION
Plan remains to return home w/ his mother on discharge. Still requiring ivf and iv pain med. . Does not have a PCP so would not be eligible for [facility] on discharge- PCP Pre-service # on discharge instructions. Declining Addiction recovery rehab or info.  No needs Funmilayo Pink, RNcase manager

## 2022-08-19 NOTE — PROGRESS NOTES
[facility] MD Alexis Hall, MD David Syed, HUMBERTO Anguiano, ANGELO  Progress Note    8/19/2022    SUBJECTIVE:   8/18: Complaining of pain in his left upper and lower extremities. Tingling in both extremities. Swelling, though in the left upper extremity due to infiltrated IV. (-) sob on room air. No cp/palp Appetite poor      PROBLEM LIST:    Principal Problem:    Hyperkalemia  Active Problems:    Acute kidney injury ([address]    Fall    IV infiltrate, initial encounter  Resolved Problems:    * No resolved hospital problems. *         DIET:    ADULT DIET;  Regular     MEDS (scheduled):    sodium chloride flush  5-40 mL IntraVENous 2 times per day       MEDS (infusions):   dextrose      sodium chloride      sodium chloride 200 mL/hr at 08/19/22 1346       MEDS (prn):  morphine, heparin flush, glucose, dextrose bolus **OR** dextrose bolus, glucagon (rDNA), dextrose, sodium chloride flush, sodium chloride, polyethylene glycol    PHYSICAL EXAM:     Patient Vitals for the past 24 hrs:   BP Temp Temp src Pulse Resp SpO2 Weight   08/19/22 1630 (!) 159/108 -- -- 70 -- 99 % --   08/19/22 0646 128/86 98.1 °F (36.7 °C) Oral 82 18 100 % --   08/19/22 0610 117/77 -- -- 88 -- -- --   08/19/22 0601 -- -- -- -- -- -- 189 lb 11.2 oz (86 kg)   08/18/22 2345 (!) 148/88 99 °F (37.2 °C) Oral 80 18 100 % --   08/18/22 1945 (!) 143/88 99 °F (37.2 °C) Oral 77 18 98 % --   @      Intake/Output Summary (Last 24 hours) at 8/19/2022 1727  Last data filed at 8/19/2022 1532  Gross per 24 hour   Intake 675 ml   Output 200 ml   Net 475 ml         Wt Readings from Last 3 Encounters:   08/19/22 189 lb 11.2 oz (86 kg)   04/30/18 145 lb (65.8 kg)   12/14/17 155 lb (70.3 kg)       Constitutional:  in no acute distress  HEENT: NC/AT, EOMI, sclera and conjunctiva are clear and anicteric, mucus membranes moist  Neck: Trachea midline, no JVD  Cardiovascular: S1, S2 regular rhythm, no murmur,or rub  Respiratory:  No crackles, no wheeze  Gastrointestinal:  Soft, nontender, nondistended, NABS  Ext: Left lower extremity edema, mild, though otherwise no edema, feet warm  Skin: dry, no rash  Neuro: awake, alert, interactive. Moves all 4 extremities      DATA:    Recent Labs     08/17/22  1038 08/18/22  0550 08/19/22  0545   WBC 17.0* 14.2* 10.3   HGB 19.8* 15.9 12.0*   HCT 59.5* 45.8 34.7*   MCV 89.5 85.1 86.8    294 207     Recent Labs     08/17/22  1401 08/17/22  1541 08/17/22  2045 08/18/22  0120 08/18/22  0550 08/18/22  1800 08/19/22  0545   *  --  130*   < > 132 129* 130*   K 5.6*   < > 5.2*   < > 5.0 4.7 4.4   CL 97*  --  90*   < > 90* 92* 93*   CO2 18*  --  26   < > 28 23 22   MG  --   --  2.3  --  2.0  --  2.0   PHOS  --   --   --   --  6.5*  --  7.1*   BUN 35*  --  40*   < > 44* 48* 54*   CREATININE 4.4*  --  5.0*   < > 6.1* 7.5* 8.6*   *  --   --   --  743*  --  582*   AST 3,096*  --   --   --  2,297*  --  1,681*   BILIDIR <0.2  --   --   --   --   --  <0.2   BILITOT 0.4  --   --   --  0.4  --  0.4   ALKPHOS 76  --   --   --  76  --  60    < > = values in this interval not displayed. Lab Results   Component Value Date    LABPROT 2.0 (H) 08/17/2022    LABPROT 2.0 08/17/2022       ASSESSMENT     1. Acute kidney injury, severe, oliguric secondary to rhabdomyolysis, itself severe    2. Rhabdomyolysis, severe, total CK on presentation 398,000, improving to total CK 8/18 morning 155,080. Awaiting reporting of this morning's true total CK (currently just listed as greater than 22,000). Does not appear to have compartment syndrome in either left upper or lower extremity, though both are quite sore, and he complains of tingling. 3.  Hyperkalemia due to HELGA, and rhabdomyolysis. 4.  Hyperphosphatemia, due to rhabdomyolysis, HELGA    5.   Transaminitis, improving    Total CK improving at a brisk pace,though quite severe on presentation  Pain in right upper and lower extremity are improving, as is the swelling.   Pending enzymes improving  Sodium improving  Azotemia progressing  Oliguric      RECOMMENDATIONS  Continue IV fluid resuscitation with normal saline at 200 cc/h  Acute hemodialysis not warranted at this point, though if azotemia continues to progress, may be looking at that need in the near future  Follow labs, UO  Continue supportive care    D/w'd Dr Ellie Marroquin      Electronically signed by Gosia Calderon MD on 8/19/2022

## 2022-08-20 LAB
ALBUMIN SERPL-MCNC: 2.2 G/DL (ref 3.5–5.2)
ALP BLD-CCNC: 52 U/L (ref 40–129)
ALT SERPL-CCNC: 521 U/L (ref 0–40)
ANION GAP SERPL CALCULATED.3IONS-SCNC: 16 MMOL/L (ref 7–16)
AST SERPL-CCNC: 1413 U/L (ref 0–39)
BASOPHILS ABSOLUTE: 0.01 E9/L (ref 0–0.2)
BASOPHILS RELATIVE PERCENT: 0.1 % (ref 0–2)
BILIRUB SERPL-MCNC: 0.5 MG/DL (ref 0–1.2)
BUN BLDV-MCNC: 61 MG/DL (ref 6–20)
CALCIUM SERPL-MCNC: 6.4 MG/DL (ref 8.6–10.2)
CHLORIDE BLD-SCNC: 92 MMOL/L (ref 98–107)
CO2: 18 MMOL/L (ref 22–29)
CREAT SERPL-MCNC: 9.8 MG/DL (ref 0.7–1.2)
EOSINOPHILS ABSOLUTE: 0.07 E9/L (ref 0.05–0.5)
EOSINOPHILS RELATIVE PERCENT: 0.9 % (ref 0–6)
GFR AFRICAN AMERICAN: 7
GFR NON-AFRICAN AMERICAN: 7 ML/MIN/1.73
GLUCOSE BLD-MCNC: 83 MG/DL (ref 74–99)
HCT VFR BLD CALC: 29.5 % (ref 37–54)
HEMOGLOBIN: 10.5 G/DL (ref 12.5–16.5)
IMMATURE GRANULOCYTES #: 0.02 E9/L
IMMATURE GRANULOCYTES %: 0.2 % (ref 0–5)
LYMPHOCYTES ABSOLUTE: 1.92 E9/L (ref 1.5–4)
LYMPHOCYTES RELATIVE PERCENT: 23.8 % (ref 20–42)
MCH RBC QN AUTO: 29.6 PG (ref 26–35)
MCHC RBC AUTO-ENTMCNC: 35.6 % (ref 32–34.5)
MCV RBC AUTO: 83.1 FL (ref 80–99.9)
METER GLUCOSE: 92 MG/DL (ref 74–99)
METER GLUCOSE: 94 MG/DL (ref 74–99)
MONOCYTES ABSOLUTE: 0.55 E9/L (ref 0.1–0.95)
MONOCYTES RELATIVE PERCENT: 6.8 % (ref 2–12)
NEUTROPHILS ABSOLUTE: 5.5 E9/L (ref 1.8–7.3)
NEUTROPHILS RELATIVE PERCENT: 68.2 % (ref 43–80)
PDW BLD-RTO: 13.9 FL (ref 11.5–15)
PLATELET # BLD: 188 E9/L (ref 130–450)
PMV BLD AUTO: 9.5 FL (ref 7–12)
POTASSIUM REFLEX MAGNESIUM: 4.2 MMOL/L (ref 3.5–5)
RBC # BLD: 3.55 E12/L (ref 3.8–5.8)
SODIUM BLD-SCNC: 126 MMOL/L (ref 132–146)
TOTAL PROTEIN: 4.6 G/DL (ref 6.4–8.3)
URINE CULTURE, ROUTINE: NORMAL
WBC # BLD: 8.1 E9/L (ref 4.5–11.5)

## 2022-08-20 PROCEDURE — 2060000000 HC ICU INTERMEDIATE R&B

## 2022-08-20 PROCEDURE — 85025 COMPLETE CBC W/AUTO DIFF WBC: CPT

## 2022-08-20 PROCEDURE — 80053 COMPREHEN METABOLIC PANEL: CPT

## 2022-08-20 PROCEDURE — 2500000003 HC RX 250 WO HCPCS: Performed by: INTERNAL MEDICINE

## 2022-08-20 PROCEDURE — 36415 COLL VENOUS BLD VENIPUNCTURE: CPT

## 2022-08-20 PROCEDURE — 2580000003 HC RX 258: Performed by: NURSE PRACTITIONER

## 2022-08-20 PROCEDURE — 6370000000 HC RX 637 (ALT 250 FOR IP): Performed by: NURSE PRACTITIONER

## 2022-08-20 PROCEDURE — 6360000002 HC RX W HCPCS: Performed by: STUDENT IN AN ORGANIZED HEALTH CARE EDUCATION/TRAINING PROGRAM

## 2022-08-20 PROCEDURE — 2580000003 HC RX 258: Performed by: INTERNAL MEDICINE

## 2022-08-20 PROCEDURE — 99232 SBSQ HOSP IP/OBS MODERATE 35: CPT | Performed by: STUDENT IN AN ORGANIZED HEALTH CARE EDUCATION/TRAINING PROGRAM

## 2022-08-20 PROCEDURE — 82962 GLUCOSE BLOOD TEST: CPT

## 2022-08-20 PROCEDURE — 51798 US URINE CAPACITY MEASURE: CPT

## 2022-08-20 RX ORDER — BUMETANIDE 0.25 MG/ML
2 INJECTION, SOLUTION INTRAMUSCULAR; INTRAVENOUS ONCE
Status: COMPLETED | OUTPATIENT
Start: 2022-08-20 | End: 2022-08-20

## 2022-08-20 RX ADMIN — MORPHINE SULFATE 4 MG: 4 INJECTION, SOLUTION INTRAMUSCULAR; INTRAVENOUS at 05:28

## 2022-08-20 RX ADMIN — POLYETHYLENE GLYCOL 3350 17 G: 17 POWDER, FOR SOLUTION ORAL at 13:59

## 2022-08-20 RX ADMIN — SODIUM CHLORIDE: 9 INJECTION, SOLUTION INTRAVENOUS at 08:00

## 2022-08-20 RX ADMIN — MORPHINE SULFATE 4 MG: 4 INJECTION, SOLUTION INTRAMUSCULAR; INTRAVENOUS at 22:55

## 2022-08-20 RX ADMIN — MORPHINE SULFATE 4 MG: 4 INJECTION, SOLUTION INTRAMUSCULAR; INTRAVENOUS at 17:16

## 2022-08-20 RX ADMIN — Medication 10 ML: at 23:03

## 2022-08-20 RX ADMIN — BUMETANIDE 2 MG: 0.25 INJECTION INTRAMUSCULAR; INTRAVENOUS at 17:16

## 2022-08-20 RX ADMIN — SODIUM CHLORIDE: 9 INJECTION, SOLUTION INTRAVENOUS at 14:00

## 2022-08-20 RX ADMIN — MORPHINE SULFATE 4 MG: 4 INJECTION, SOLUTION INTRAMUSCULAR; INTRAVENOUS at 11:43

## 2022-08-20 ASSESSMENT — PAIN DESCRIPTION - ORIENTATION
ORIENTATION: LEFT

## 2022-08-20 ASSESSMENT — PAIN DESCRIPTION - DESCRIPTORS
DESCRIPTORS: ACHING
DESCRIPTORS: ACHING;SHARP
DESCRIPTORS: SQUEEZING

## 2022-08-20 ASSESSMENT — PAIN DESCRIPTION - LOCATION
LOCATION: LEG
LOCATION: LEG
LOCATION: HIP;LEG;ARM

## 2022-08-20 ASSESSMENT — PAIN SCALES - GENERAL
PAINLEVEL_OUTOF10: 8
PAINLEVEL_OUTOF10: 10
PAINLEVEL_OUTOF10: 8
PAINLEVEL_OUTOF10: 9
PAINLEVEL_OUTOF10: 10
PAINLEVEL_OUTOF10: 6
PAINLEVEL_OUTOF10: 6

## 2022-08-20 NOTE — PROGRESS NOTES
[facility] MD Lex Gallegos, MD Guy Tineo, MD Noble Tate, CNP   Linda Anguiano, ANGELO  Progress Note    8/20/2022    SUBJECTIVE:   8/18: Complaining of pain in his left upper and lower extremities. Tingling in both extremities. Swelling, though in the left upper extremity due to infiltrated IV. (-) sob on room air. No cp/palp Appetite poor    8/20 seen in her room on RA , alert oriented cooperative stable bp . PROBLEM LIST:    Principal Problem:    Hyperkalemia  Active Problems:    Acute kidney injury ([address]    Fall    IV infiltrate, initial encounter  Resolved Problems:    * No resolved hospital problems. *         DIET:    ADULT DIET;  Regular     MEDS (scheduled):    sodium chloride flush  5-40 mL IntraVENous 2 times per day       MEDS (infusions):   dextrose      sodium chloride      sodium chloride 200 mL/hr at 08/19/22 2102       MEDS (prn):  morphine, heparin flush, glucose, dextrose bolus **OR** dextrose bolus, glucagon (rDNA), dextrose, sodium chloride flush, sodium chloride, polyethylene glycol    PHYSICAL EXAM:     Patient Vitals for the past 24 hrs:   BP Temp Temp src Pulse Resp SpO2 Weight   08/20/22 1345 (!) 149/105 98 °F (36.7 °C) Axillary -- 18 99 % --   08/20/22 0845 (!) 138/100 97.4 °F (36.3 °C) Axillary -- 18 98 % --   08/20/22 0634 -- -- -- -- -- -- 197 lb 4.8 oz (89.5 kg)   08/20/22 0528 -- -- -- -- 18 -- --   08/19/22 2215 (!) 145/90 98.6 °F (37 °C) Oral 80 18 95 % --   08/19/22 1630 (!) 159/108 -- -- 70 -- 99 % --     @      Intake/Output Summary (Last 24 hours) at 8/20/2022 1448  Last data filed at 8/20/2022 1429  Gross per 24 hour   Intake 1890 ml   Output 300 ml   Net 1590 ml           Wt Readings from Last 3 Encounters:   08/20/22 197 lb 4.8 oz (89.5 kg)   04/30/18 145 lb (65.8 kg)   12/14/17 155 lb (70.3 kg)       Constitutional:  in no acute distress  HEENT: NC/AT, EOMI, sclera and conjunctiva are clear and anicteric, mucus membranes moist  Neck: Trachea midline, no JVD  Cardiovascular: S1, S2 regular rhythm, no murmur,or rub  Respiratory:  No crackles, no wheeze  Gastrointestinal:  Soft, nontender, nondistended, NABS  Ext: Left lower extremity edema, mild, though otherwise no edema, feet warm  Skin: dry, no rash  Neuro: awake, alert, interactive. Moves all 4 extremities      DATA:    Recent Labs     08/18/22  0550 08/19/22  0545 08/20/22  0530   WBC 14.2* 10.3 8.1   HGB 15.9 12.0* 10.5*   HCT 45.8 34.7* 29.5*   MCV 85.1 86.8 83.1    207 188       Recent Labs     08/17/22  2045 08/18/22  0120 08/18/22  0550 08/18/22  1800 08/19/22  0545 08/20/22  0530   *   < > 132 129* 130* 126*   K 5.2*   < > 5.0 4.7 4.4 4.2   CL 90*   < > 90* 92* 93* 92*   CO2 26   < > 28 23 22 18*   MG 2.3  --  2.0  --  2.0  --    PHOS  --   --  6.5*  --  7.1*  --    BUN 40*   < > 44* 48* 54* 61*   CREATININE 5.0*   < > 6.1* 7.5* 8.6* 9.8*   ALT  --   --  743*  --  582* 521*   AST  --   --  2,297*  --  1,681* 1,413*   BILIDIR  --   --   --   --  <0.2  --    BILITOT  --   --  0.4  --  0.4 0.5   ALKPHOS  --   --  76  --  60 52    < > = values in this interval not displayed. Lab Results   Component Value Date    LABPROT 2.0 (H) 08/17/2022    LABPROT 2.0 08/17/2022       ASSESSMENT     1. Acute kidney injury, severe, oliguric secondary to rhabdomyolysis, itself severe    2. Rhabdomyolysis, severe, total CK on presentation 398,000, improving to total CK 8/18 morning 155,080. Awaiting reporting of this morning's true total CK (currently just listed as greater than 22,000). Does not appear to have compartment syndrome in either left upper or lower extremity, though both are quite sore, and he complains of tingling. 3.  Hyperkalemia due to HELGA, and rhabdomyolysis. 4.  Hyperphosphatemia, due to rhabdomyolysis, HELGA    5.   Transaminitis, improving    Total CK improving at a brisk pace,though quite severe on presentation  Pain in right upper and lower extremity are improving, as is the swelling. Pending enzymes improving  Sodium improving  Azotemia progressing  Oliguric      RECOMMENDATIONS  Continue IV fluid resuscitation with normal saline at 200 cc/h  Acute hemodialysis not warranted at this point, though if azotemia continues to progress, may be looking at that need in the near future  Give one dose iv bumex 2 mg once .   Follow labs, UO  Continue supportive care          Electronically signed by Gem Dudley MD on 8/20/2022

## 2022-08-20 NOTE — PROGRESS NOTES
PROGRESS NOTE  By MARIA A Lambert    [facility]  Dr. Sheri Mondragon M.D.,  Dr. Aashish Mcmahon M.D.,   Dr. Will Toscano D.O.,  Dr. Bayron Dobbins M.D.,  Dr. Jen Vega D.O.,  Ligia Yee D.O. Chely Bhagat  42 y.o.  male    SUBJECTIVE:  Denies abdominal pain. Denies nausea or vomiting. Denies chest pain or shortness of breath. Complains of left leg pain. Bowel movement yesterday. Tolerating diet. OBJECTIVE:    BP (!) 138/100   Pulse 80   Temp 97.4 °F (36.3 °C) (Axillary)   Resp 18   Ht 5' 10\" (1.778 m)   Wt 197 lb 4.8 oz (89.5 kg)   SpO2 98%   BMI 28.31 kg/m²     General: NAD/-American male. HEENT: Anicteric sclera/moist oral mucosa  Neck: Appears supple with trachea midline  Chest: Symmetric excursion/nonlabored respirations  Cor: Regular  Abd.: Soft and nondistended  Extr.:  No peripheral edema  Skin: Warm and dry      DATA:    Monitor data reviewed -sinus rhythm noted.        Lab Results   Component Value Date/Time    WBC 8.1 08/20/2022 05:30 AM    RBC 3.55 08/20/2022 05:30 AM    HGB 10.5 08/20/2022 05:30 AM    HCT 29.5 08/20/2022 05:30 AM    MCV 83.1 08/20/2022 05:30 AM    MCH 29.6 08/20/2022 05:30 AM    MCHC 35.6 08/20/2022 05:30 AM    RDW 13.9 08/20/2022 05:30 AM     08/20/2022 05:30 AM    MPV 9.5 08/20/2022 05:30 AM     Lab Results   Component Value Date/Time     08/20/2022 05:30 AM    K 4.2 08/20/2022 05:30 AM    CL 92 08/20/2022 05:30 AM    CO2 18 08/20/2022 05:30 AM    BUN 61 08/20/2022 05:30 AM    CREATININE 9.8 08/20/2022 05:30 AM    CALCIUM 6.4 08/20/2022 05:30 AM    PROT 4.6 08/20/2022 05:30 AM    LABALBU 2.2 08/20/2022 05:30 AM    BILITOT 0.5 08/20/2022 05:30 AM    ALKPHOS 52 08/20/2022 05:30 AM    AST 1,413 08/20/2022 05:30 AM     08/20/2022 05:30 AM     No results found for: LIPASE  No results found for: AMYLASE      ASSESSMENT/PLAN:  Patient Active Problem List   Diagnosis    Hyperkalemia    [facility] ([facility] Fall    IV infiltrate, initial encounter         Assessment / Plan:    1. Transaminitis  -Likely from muscle origin with possible component of shock liver precipitated by LOC/cocaine  -Monitor liver synthetic function with INR and monitor patient mental status  -Await viral hepatitis serology  -Have low threshold for transfer to tertiary care facility in case of precipitous worsening in liver profile/function and mental status changes     2. Loss of consciousness/substance abuse/rhabdomyolysis/renal failure  -Per admitting/pertinent consultants      Some improvement in liver enzymes. Worsening renal function. Continue to monitor labs and mental status. GI will follow. Esthela Rivera, KARY - CNP  8/20/2022  9:29 AM    NOTE:  This report was transcribed using voice recognition software. Every effort was made to ensure accuracy; however, inadvertent computerized transcription errors may be present.

## 2022-08-20 NOTE — PLAN OF CARE
Problem: Pain  Goal: Verbalizes/displays adequate comfort level or baseline comfort level  Outcome: Not Progressing     Problem: Discharge Planning  Goal: Discharge to home or other facility with appropriate resources  Outcome: Not Progressing  Flowsheets (Taken 8/20/2022 0845)  Discharge to home or other facility with appropriate resources: Identify barriers to discharge with patient and caregiver     Problem: Metabolic/Fluid and Electrolytes - Adult  Goal: Electrolytes maintained within normal limits  Outcome: Not Progressing  Flowsheets (Taken 8/20/2022 0845)  Electrolytes maintained within normal limits: Monitor labs and assess patient for signs and symptoms of electrolyte imbalances

## 2022-08-20 NOTE — PROGRESS NOTES
[facility] Progress Note    Admitting Date and Time: 8/17/2022  9:25 AM  Admit Dx: Hyperkalemia [E87.5]  Shock liver [K72.00]  Acute kidney injury ([address] [N17.9]  Fall, initial encounter [W19. XXXA]  Tra[facility]habdom[facility], [facility] encounter ([facility] Savage Joao. 6XXA]  Leukocytosis, unspecified type [D72.829]    Subjective:  Patient is being followed for Hyperkalemia [E87.5]  Shock liver [K72.00]  Acute kidney injury ([facility] [N17.9]  Fall, initial encounter [W19. XXXA]  Traumatic rhabdomyolysis, initial encounter ([facility] Janette Joao. 6XXA]  Leukocytosis, unspecified type [D72.829]   Pt  reports his pain left upper and lower extremity slightly improved. Per RN: No major concerns.     ROS: denies fever, chills, cp, sob, n/v, HA unless stated above.      sodium chloride flush  5-40 mL IntraVENous 2 times per day     morphine, 4 mg, Q6H PRN  heparin flush, 100 Units, PRN  glucose, 4 tablet, PRN  dextrose bolus, 125 mL, PRN   Or  dextrose bolus, 250 mL, PRN  glucagon (rDNA), 1 mg, PRN  dextrose, , Continuous PRN  sodium chloride flush, 5-40 mL, PRN  sodium chloride, , PRN  polyethylene glycol, 17 g, Daily PRN       Objective:    BP (!) 138/100   Pulse 80   Temp 97.4 °F (36.3 °C) (Axillary)   Resp 18   Ht 5' 10\" (1.778 m)   Wt 197 lb 4.8 oz (89.5 kg)   SpO2 98%   BMI 28.31 kg/m²     General Appearance: alert and oriented to person, place and time and in mod acute distress from pain  Skin: warm and dry, bruises left face, tattoos  Head: normocephalic and atraumatic  Eyes: pupils equal, round, and reactive to light, extraocular eye movements intact, conjunctivae normal  Neck: neck supple and non tender without mass   Pulmonary/Chest: clear to auscultation bilaterally- no wheezes, rales or rhonchi, normal air movement, no respiratory distress  Cardiovascular: normal rate, normal S1 and S2 and no carotid bruits  Abdomen: soft, non-tender, non-distended, normal bowel sounds, no masses or organomegaly  Extremities: no cyanosis, no clubbing and + edema, swelling, restricted ROM, pain exacerbated by passive stretch left upper and lower extremity, firm to palpation, paresthesias, numbness+, distal pulses palpable, rt femoral cvc  Neurologic: no cranial nerve deficit and speech normal      Recent Labs     08/18/22  1800 08/19/22  0545 08/20/22  0530   * 130* 126*   K 4.7 4.4 4.2   CL 92* 93* 92*   CO2 23 22 18*   BUN 48* 54* 61*   CREATININE 7.5* 8.6* 9.8*   GLUCOSE 110* 98 83   CALCIUM 6.3* 6.5* 6.4*       Recent Labs     08/18/22  0550 08/19/22  0545 08/20/22  0530   WBC 14.2* 10.3 8.1   RBC 5.38 4.00 3.55*   HGB 15.9 12.0* 10.5*   HCT 45.8 34.7* 29.5*   MCV 85.1 86.8 83.1   MCH 29.6 30.0 29.6   MCHC 34.7* 34.6* 35.6*   RDW 14.5 14.1 13.9    207 188   MPV 9.3 9.6 9.5       Micro:  No components found for: [facility])    Radiology:   XR PELVIS (1-2 VIEWS)    Result Date: 8/17/2022  EXAMINATION: ONE XRAY VIEW OF THE PELVIS 8/17/2022 10:21 am COMPARISON: None. HISTORY: ORDERING SYSTEM PROVIDED HISTORY: fall TECHNOLOGIST PROVIDED HISTORY: Reason for exam:->fall FINDINGS: No evidence of pelvic fracture. Bilateral hips demonstrate normal alignment. No focal osseous lesion. SI joints are symmetric. No acute abnormality of the pelvis. XR HUMERUS LEFT (MIN 2 VIEWS)    Result Date: 8/17/2022  EXAMINATION: TWO XRAY VIEWS OF THE LEFT HUMERUS 8/17/2022 7:58 pm COMPARISON: None. HISTORY: ORDERING SYSTEM PROVIDED HISTORY: Pain TECHNOLOGIST PROVIDED HISTORY: Reason for exam:->Pain FINDINGS: Alignment: No traumatic malalignment. Osseous: Intact without a definite acute fracture identified. Joints: No significant arthritic disease is present. Other: There is prominent appearing soft tissue swelling and apparently linear air anteriorly, advise clinical correlation. .     No acute fracture is identified. Prominent soft tissue swelling an apparent air tracking in the antecubital region. RECOMMENDATION: Clinical correlation.      XR ELBOW LEFT (MIN 3 VIEWS)    Result Date: 8/17/2022  EXAMINATION: THREE X-RAY VIEWS OF THE LEFT ELBOW 8/17/2022 7:58 pm COMPARISON: None HISTORY: ORDERING SYSTEM PROVIDED HISTORY: Pain TECHNOLOGIST PROVIDED HISTORY: Reason for Exam:  Pain FINDINGS: Alignment: No traumatic malalignment. Osseous: Intact without a definite acute fracture identified. Joints: No significant arthritic disease is present. Other: There is prominent appearing soft tissue swelling and apparently linear air anteriorly, advise clinical correlation. No acute fracture is identified. Prominent soft tissue swelling and apparent air tracking. RECOMMENDATION: Clinical correlation. XR RADIUS ULNA LEFT (2 VIEWS)    Result Date: 8/17/2022  EXAMINATION: TWO XRAY VIEWS OF THE LEFT FOREARM 8/17/2022 7:58 pm COMPARISON: None. HISTORY: ORDERING SYSTEM PROVIDED HISTORY: Pain TECHNOLOGIST PROVIDED HISTORY: Reason for exam:->Pain FINDINGS: Alignment: No traumatic malalignment. Osseous: Intact without a definite acute fracture identified. Joints: No significant arthritic disease is present. Other: There is prominent appearing soft tissue swelling and apparently linear air anteriorly, advise clinical correlation for relating to procedure versus complication. .. No acute fracture is identified. Prominent soft tissue swelling an apparent air tracking. Results were discussed with the ordering physician. RECOMMENDATION: Clinical correlation. CT Head WO Contrast    Result Date: 8/17/2022  EXAMINATION: CT OF THE HEAD WITHOUT CONTRAST  8/17/2022 10:34 am TECHNIQUE: CT of the head was performed without the administration of intravenous contrast. Automated exposure control, iterative reconstruction, and/or weight based adjustment of the mA/kV was utilized to reduce the radiation dose to as low as reasonably achievable. COMPARISON: None.  HISTORY: ORDERING SYSTEM PROVIDED HISTORY: fall TECHNOLOGIST PROVIDED HISTORY: Reason for exam:->fall Has a \"code stroke\" or \"stroke alert\" been called? ->No Decision Support Exception - unselect if not a suspected or confirmed emergency medical condition->Emergency Medical Condition (MA) FINDINGS: BRAIN/VENTRICLES: There is no acute intracranial hemorrhage, mass effect or midline shift. No abnormal extra-axial fluid collection. The gray-white differentiation is maintained without evidence of an acute infarct. There is no evidence of hydrocephalus. ORBITS: The visualized portion of the orbits demonstrate no acute abnormality. SINUSES: The visualized paranasal sinuses and mastoid air cells demonstrate no acute abnormality. SOFT TISSUES/SKULL:  No acute abnormality of the visualized skull or soft tissues. No acute intracranial abnormality. CT Cervical Spine WO Contrast    Result Date: 8/17/2022  EXAMINATION: CT OF THE CERVICAL SPINE WITHOUT CONTRAST 8/17/2022 10:34 am TECHNIQUE: CT of the cervical spine was performed without the administration of intravenous contrast. Multiplanar reformatted images are provided for review. Automated exposure control, iterative reconstruction, and/or weight based adjustment of the mA/kV was utilized to reduce the radiation dose to as low as reasonably achievable. COMPARISON: None. HISTORY: ORDERING SYSTEM PROVIDED HISTORY: fall TECHNOLOGIST PROVIDED HISTORY: Reason for exam:->fall Decision Support Exception - unselect if not a suspected or confirmed emergency medical condition->Emergency Medical Condition (MA) FINDINGS: BONES/ALIGNMENT: There is no acute fracture or traumatic malalignment. There is straightening of lordotic curvature likely due to muscular spasm and/or positioning of the neck. DEGENERATIVE CHANGES: No significant degenerative changes. SOFT TISSUES: There is no prevertebral soft tissue swelling. Lung apices: Small right apical bullae. No acute abnormality of the cervical spine.      XR SHOULDER LEFT (MIN 2 VIEWS)    Result Date: 8/17/2022  EXAMINATION: THREE XRAY VIEWS OF THE LEFT SHOULDER 8/17/2022 10:21 am COMPARISON: None. HISTORY: ORDERING SYSTEM PROVIDED HISTORY: fall TECHNOLOGIST PROVIDED HISTORY: Reason for exam:->fall FINDINGS: Glenohumeral joint is normally aligned. No evidence of acute fracture or dislocation. No abnormal periarticular calcifications. The [facility] joint is unremarkable in appearance. Visualized lung is unremarkable. No acute abnormality. US GALLBLADDER RUQ    Result Date: 8/17/2022  EXAMINATION: RIGHT UPPER QUADRANT ULTRASOUND 8/17/2022 5:35 pm COMPARISON: None. HISTORY: ORDERING SYSTEM PROVIDED HISTORY: shock liver TECHNOLOGIST PROVIDED HISTORY: Reason for exam:->shock liver What reading provider will be dictating this exam?->CRC FINDINGS: LIVER:  The liver demonstrates normal echogenicity without evidence of intrahepatic biliary ductal dilatation. BILIARY SYSTEM:  Gallbladder is unremarkable without evidence of pericholecystic fluid, wall thickening or stones. Negative sonographic Davies's sign. Common bile duct is within normal limits measuring . PANCREAS:  Visualized portions of the pancreas are unremarkable. OTHER: No evidence of right upper quadrant ascites. Unremarkable right upper quadrant ultrasound. US RETROPERITONEAL COMPLETE    Result Date: 8/17/2022  EXAMINATION: RETROPERITONEAL ULTRASOUND OF THE KIDNEYS AND URINARY BLADDER 8/17/2022 COMPARISON: None HISTORY: ORDERING SYSTEM PROVIDED HISTORY: ryne, r/o hydronephrosis TECHNOLOGIST PROVIDED HISTORY: Reason for exam:->ryne, r/o hydronephrosis What reading provider will be dictating this exam?->CRC FINDINGS: Kidneys: The right kidney measures 10.0 cm in length and the left kidney measures 11.6 cm in length. Bilateral echogenic renal cortices. No evidence of hydronephrosis or intrarenal stones. Bladder: Echogenic debris within the floor of the urinary bladder. Bilateral echogenic renal cortices. Echogenic debris within the floor of the urinary bladder. Follow-up recommended. Assessment:    Principal Problem:    Hyperkalemia  Active Problems:    Acute kidney injury ([address]    Fall    IV infiltrate, initial encounter  Resolved Problems:    * No resolved hospital problems. *      Plan:  Traumatic Rhabdomyolysis- questionable mech Fall - Hyperkalemia, acidosis, HELGA worsening- Vigorous hydration, IVFs with bicarb, f/u CK, Nephrology on board, s/p  Ca+Gluc/HCO3/Regular Insulin/Lokelma, EKG with changes related to hyperkalemia. Transaminitis - ? Shock liver vs Drug overdose - utox positive for cocaine, fentanyl, tylenol level wnl, hepatitis panel, hiv, vdrl, INR, USG GB/Liver unremarkable, Ethanol <10, GI consulted. 3. Hyperglycemia - No known DM. Glucose 187 on admission. A1C - 5.9,  lipid panel wnl. Follow, currently on IVFs with dextrose. 4. Possible UTI - leukocytosis, UA abnormal, debris in bladder on US retroperitoneum, f/u urine cx.  5.  Contusions/ myalgias vs  Incipient compartment synd  ( ruled out per ortho)- - Left upper arm and left upper thigh, \"out of proportion pain \"swelling, decreased ROM, paresthesias, numbness, pain exacerbated by passive stretch left upper and lower extremity, very firm to palpation, increased girth relative to contralateral extremity, in the settings of rhabdomyolysis, hyperkalemia, acidosis, though pulses intact with intact motor strength is usually a late finding), consulted ortho for urgent consult, prn analgesics. Dopplers neg for clot, imaging neg for fractures. Will defer further surgical management including prophylactic fasciotomy to ortho surgeon per his expertise. Since pain improving will wean morphine 4 mg q4hrs to q 6hrs. Rt femoral CVC for vascular access. DVT prophylaxis - SCDs      NOTE: This report was transcribed using voice recognition software. Every effort was made to ensure accuracy; however, inadvertent computerized transcription errors may be present.   Electronically signed by Chasidy Johnson MD on 8/20/2022 at 1:07 PM

## 2022-08-21 ENCOUNTER — APPOINTMENT (OUTPATIENT)
Dept: ULTRASOUND IMAGING | Age: 37
DRG: 351 | End: 2022-08-21
Payer: MEDICAID

## 2022-08-21 LAB
ALBUMIN SERPL-MCNC: 2.2 G/DL (ref 3.5–5.2)
ALP BLD-CCNC: 57 U/L (ref 40–129)
ALT SERPL-CCNC: 437 U/L (ref 0–40)
ANION GAP SERPL CALCULATED.3IONS-SCNC: 14 MMOL/L (ref 7–16)
AST SERPL-CCNC: 943 U/L (ref 0–39)
BASOPHILS ABSOLUTE: 0.02 E9/L (ref 0–0.2)
BASOPHILS RELATIVE PERCENT: 0.2 % (ref 0–2)
BILIRUB SERPL-MCNC: 0.5 MG/DL (ref 0–1.2)
BILIRUBIN DIRECT: <0.2 MG/DL (ref 0–0.3)
BILIRUBIN, INDIRECT: ABNORMAL MG/DL (ref 0–1)
BUN BLDV-MCNC: 69 MG/DL (ref 6–20)
CALCIUM SERPL-MCNC: 6.7 MG/DL (ref 8.6–10.2)
CHLORIDE BLD-SCNC: 95 MMOL/L (ref 98–107)
CO2: 16 MMOL/L (ref 22–29)
CREAT SERPL-MCNC: 11.1 MG/DL (ref 0.7–1.2)
EOSINOPHILS ABSOLUTE: 0.16 E9/L (ref 0.05–0.5)
EOSINOPHILS RELATIVE PERCENT: 1.9 % (ref 0–6)
GFR AFRICAN AMERICAN: 6
GFR NON-AFRICAN AMERICAN: 6 ML/MIN/1.73
GLUCOSE BLD-MCNC: 87 MG/DL (ref 74–99)
HCT VFR BLD CALC: 32.3 % (ref 37–54)
HEMOGLOBIN: 11.6 G/DL (ref 12.5–16.5)
IMMATURE GRANULOCYTES #: 0.01 E9/L
IMMATURE GRANULOCYTES %: 0.1 % (ref 0–5)
INR BLD: 0.9
LYMPHOCYTES ABSOLUTE: 2.04 E9/L (ref 1.5–4)
LYMPHOCYTES RELATIVE PERCENT: 24.5 % (ref 20–42)
MCH RBC QN AUTO: 30.2 PG (ref 26–35)
MCHC RBC AUTO-ENTMCNC: 35.9 % (ref 32–34.5)
MCV RBC AUTO: 84.1 FL (ref 80–99.9)
MONOCYTES ABSOLUTE: 0.51 E9/L (ref 0.1–0.95)
MONOCYTES RELATIVE PERCENT: 6.1 % (ref 2–12)
NEUTROPHILS ABSOLUTE: 5.58 E9/L (ref 1.8–7.3)
NEUTROPHILS RELATIVE PERCENT: 67.2 % (ref 43–80)
PDW BLD-RTO: 13.5 FL (ref 11.5–15)
PLATELET # BLD: 221 E9/L (ref 130–450)
PMV BLD AUTO: 9.6 FL (ref 7–12)
POTASSIUM SERPL-SCNC: 4.4 MMOL/L (ref 3.5–5)
PROTHROMBIN TIME: 10.1 SEC (ref 9.3–12.4)
RBC # BLD: 3.84 E12/L (ref 3.8–5.8)
SODIUM BLD-SCNC: 125 MMOL/L (ref 132–146)
TOTAL PROTEIN: 4.7 G/DL (ref 6.4–8.3)
WBC # BLD: 8.3 E9/L (ref 4.5–11.5)

## 2022-08-21 PROCEDURE — 76870 US EXAM SCROTUM: CPT

## 2022-08-21 PROCEDURE — 80048 BASIC METABOLIC PNL TOTAL CA: CPT

## 2022-08-21 PROCEDURE — 99233 SBSQ HOSP IP/OBS HIGH 50: CPT | Performed by: STUDENT IN AN ORGANIZED HEALTH CARE EDUCATION/TRAINING PROGRAM

## 2022-08-21 PROCEDURE — 85610 PROTHROMBIN TIME: CPT

## 2022-08-21 PROCEDURE — 2500000003 HC RX 250 WO HCPCS: Performed by: INTERNAL MEDICINE

## 2022-08-21 PROCEDURE — 36415 COLL VENOUS BLD VENIPUNCTURE: CPT

## 2022-08-21 PROCEDURE — 80076 HEPATIC FUNCTION PANEL: CPT

## 2022-08-21 PROCEDURE — 6360000002 HC RX W HCPCS: Performed by: NURSE PRACTITIONER

## 2022-08-21 PROCEDURE — 2580000003 HC RX 258: Performed by: NURSE PRACTITIONER

## 2022-08-21 PROCEDURE — 2060000000 HC ICU INTERMEDIATE R&B

## 2022-08-21 PROCEDURE — 6360000002 HC RX W HCPCS: Performed by: STUDENT IN AN ORGANIZED HEALTH CARE EDUCATION/TRAINING PROGRAM

## 2022-08-21 PROCEDURE — 85025 COMPLETE CBC W/AUTO DIFF WBC: CPT

## 2022-08-21 PROCEDURE — 99223 1ST HOSP IP/OBS HIGH 75: CPT | Performed by: SURGERY

## 2022-08-21 PROCEDURE — 2580000003 HC RX 258: Performed by: INTERNAL MEDICINE

## 2022-08-21 RX ORDER — MORPHINE SULFATE 4 MG/ML
4 INJECTION, SOLUTION INTRAMUSCULAR; INTRAVENOUS EVERY 4 HOURS PRN
Status: DISCONTINUED | OUTPATIENT
Start: 2022-08-21 | End: 2022-08-31

## 2022-08-21 RX ADMIN — MORPHINE SULFATE 4 MG: 4 INJECTION, SOLUTION INTRAMUSCULAR; INTRAVENOUS at 11:13

## 2022-08-21 RX ADMIN — Medication 10 ML: at 23:07

## 2022-08-21 RX ADMIN — BUMETANIDE 1 MG/HR: 0.25 INJECTION INTRAMUSCULAR; INTRAVENOUS at 14:01

## 2022-08-21 RX ADMIN — MORPHINE SULFATE 4 MG: 4 INJECTION, SOLUTION INTRAMUSCULAR; INTRAVENOUS at 07:26

## 2022-08-21 RX ADMIN — HYDROMORPHONE HYDROCHLORIDE 1 MG: 1 INJECTION, SOLUTION INTRAMUSCULAR; INTRAVENOUS; SUBCUTANEOUS at 01:54

## 2022-08-21 RX ADMIN — MORPHINE SULFATE 4 MG: 4 INJECTION, SOLUTION INTRAMUSCULAR; INTRAVENOUS at 19:15

## 2022-08-21 RX ADMIN — MORPHINE SULFATE 4 MG: 4 INJECTION, SOLUTION INTRAMUSCULAR; INTRAVENOUS at 22:58

## 2022-08-21 RX ADMIN — MORPHINE SULFATE 4 MG: 4 INJECTION, SOLUTION INTRAMUSCULAR; INTRAVENOUS at 15:25

## 2022-08-21 ASSESSMENT — PAIN SCALES - GENERAL
PAINLEVEL_OUTOF10: 10
PAINLEVEL_OUTOF10: 9
PAINLEVEL_OUTOF10: 9
PAINLEVEL_OUTOF10: 10
PAINLEVEL_OUTOF10: 8
PAINLEVEL_OUTOF10: 10
PAINLEVEL_OUTOF10: 9
PAINLEVEL_OUTOF10: 10

## 2022-08-21 ASSESSMENT — PAIN DESCRIPTION - ORIENTATION
ORIENTATION: LEFT
ORIENTATION: LEFT

## 2022-08-21 ASSESSMENT — PAIN DESCRIPTION - LOCATION
LOCATION: GROIN;BUTTOCKS
LOCATION: SCROTUM;LEG
LOCATION: SCROTUM;PENIS
LOCATION: PENIS;SCROTUM
LOCATION: GROIN;BUTTOCKS

## 2022-08-21 ASSESSMENT — PAIN DESCRIPTION - DESCRIPTORS
DESCRIPTORS: ACHING

## 2022-08-21 NOTE — PROGRESS NOTES
Bladderscan was done by this nurse and the maximum obtained was 17ml. Patient just voided 200ml in the urinal. Patient agreed to have the Telemonitor back in place. One time dose of Dilaudid was given for pain.

## 2022-08-21 NOTE — PROGRESS NOTES
[facility] Progress Note    Admitting Date and Time: 8/17/2022  9:25 AM  Admit Dx: Hyperkalemia [E87.5]  Shock liver [K72.00]  Acute kidney injury ([facility] [N17.9]  Fall, initial encounter [W19. XXXA]  [facility] Fercho Ana Laurajessie. 6XXA]  Leukocytosis, unspecified type [D72.829]    Subjective:  Patient is being followed for Hyperkalemia [E87.5]  Shock liver [K72.00]  Acute kidney injury ([facility] [N17.9]  Fall, initial encounter [W19. XXXA]  Traumatic rhabdomyolysis, initial encounter ([facility] Fercho Ana Laurajessie. 6XXA]  Leukocytosis, unspecified type [D72.829]   Pt  reports excruciating pain in scrotal area and hip region. Per RN: No major concerns, received dilaudid for pain last night.     ROS: denies fever, chills, cp, sob, n/v, HA unless stated above.      sodium chloride flush  5-40 mL IntraVENous 2 times per day     morphine, 4 mg, Q6H PRN  heparin flush, 100 Units, PRN  glucose, 4 tablet, PRN  dextrose bolus, 125 mL, PRN   Or  dextrose bolus, 250 mL, PRN  glucagon (rDNA), 1 mg, PRN  dextrose, , Continuous PRN  sodium chloride flush, 5-40 mL, PRN  sodium chloride, , PRN  polyethylene glycol, 17 g, Daily PRN       Objective:    BP (!) 143/87   Pulse 89   Temp 98.3 °F (36.8 °C) (Axillary)   Resp 18   Ht 5' 10\" (1.778 m)   Wt 216 lb 9.6 oz (98.2 kg)   SpO2 99%   BMI 31.08 kg/m²     General Appearance: alert and oriented to person, place and time and in mod acute distress from pain  Skin: warm and dry, bruises left face, tattoos  Head: normocephalic and atraumatic  Eyes: pupils equal, round, and reactive to light, extraocular eye movements intact, conjunctivae normal  Neck: neck supple and non tender without mass   Pulmonary/Chest: clear to auscultation bilaterally- no wheezes, rales or rhonchi, normal air movement, no respiratory distress  Cardiovascular: normal rate, normal S1 and S2 and no carotid bruits  Abdomen: soft, non-tender, non-distended, normal bowel sounds, no masses or organomegaly, scrotal swelling  Extremities: no cyanosis, no clubbing and + edema, swelling, restricted ROM, pain exacerbated by passive stretch left upper and lower extremity, firm to palpation, paresthesias, numbness+, distal pulses palpable, rt femoral cvc  Neurologic: no cranial nerve deficit and speech normal      Recent Labs     08/19/22  0545 08/20/22  0530 08/21/22  0539   * 126* 125*   K 4.4 4.2 4.4   CL 93* 92* 95*   CO2 22 18* 16*   BUN 54* 61* 69*   CREATININE 8.6* 9.8* 11.1*   GLUCOSE 98 83 87   CALCIUM 6.5* 6.4* 6.7*       Recent Labs     08/19/22  0545 08/20/22  0530 08/21/22  0539   WBC 10.3 8.1 8.3   RBC 4.00 3.55* 3.84   HGB 12.0* 10.5* 11.6*   HCT 34.7* 29.5* 32.3*   MCV 86.8 83.1 84.1   MCH 30.0 29.6 30.2   MCHC 34.6* 35.6* 35.9*   RDW 14.1 13.9 13.5    188 221   MPV 9.6 9.5 9.6       Micro:  No components found for: [facility])    Radiology:   XR PELVIS (1-2 VIEWS)    Result Date: 8/17/2022  EXAMINATION: ONE XRAY VIEW OF THE PELVIS 8/17/2022 10:21 am COMPARISON: None. HISTORY: ORDERING SYSTEM PROVIDED HISTORY: fall TECHNOLOGIST PROVIDED HISTORY: Reason for exam:->fall FINDINGS: No evidence of pelvic fracture. Bilateral hips demonstrate normal alignment. No focal osseous lesion. SI joints are symmetric. No acute abnormality of the pelvis. XR HUMERUS LEFT (MIN 2 VIEWS)    Result Date: 8/17/2022  EXAMINATION: TWO XRAY VIEWS OF THE LEFT HUMERUS 8/17/2022 7:58 pm COMPARISON: None. HISTORY: ORDERING SYSTEM PROVIDED HISTORY: Pain TECHNOLOGIST PROVIDED HISTORY: Reason for exam:->Pain FINDINGS: Alignment: No traumatic malalignment. Osseous: Intact without a definite acute fracture identified. Joints: No significant arthritic disease is present. Other: There is prominent appearing soft tissue swelling and apparently linear air anteriorly, advise clinical correlation. .     No acute fracture is identified. Prominent soft tissue swelling an apparent air tracking in the antecubital region. RECOMMENDATION: Clinical correlation. XR ELBOW LEFT (MIN 3 VIEWS)    Result Date: 8/17/2022  EXAMINATION: THREE X-RAY VIEWS OF THE LEFT ELBOW 8/17/2022 7:58 pm COMPARISON: None HISTORY: ORDERING SYSTEM PROVIDED HISTORY: Pain TECHNOLOGIST PROVIDED HISTORY: Reason for Exam:  Pain FINDINGS: Alignment: No traumatic malalignment. Osseous: Intact without a definite acute fracture identified. Joints: No significant arthritic disease is present. Other: There is prominent appearing soft tissue swelling and apparently linear air anteriorly, advise clinical correlation. No acute fracture is identified. Prominent soft tissue swelling and apparent air tracking. RECOMMENDATION: Clinical correlation. XR RADIUS ULNA LEFT (2 VIEWS)    Result Date: 8/17/2022  EXAMINATION: TWO XRAY VIEWS OF THE LEFT FOREARM 8/17/2022 7:58 pm COMPARISON: None. HISTORY: ORDERING SYSTEM PROVIDED HISTORY: Pain TECHNOLOGIST PROVIDED HISTORY: Reason for exam:->Pain FINDINGS: Alignment: No traumatic malalignment. Osseous: Intact without a definite acute fracture identified. Joints: No significant arthritic disease is present. Other: There is prominent appearing soft tissue swelling and apparently linear air anteriorly, advise clinical correlation for relating to procedure versus complication. .. No acute fracture is identified. Prominent soft tissue swelling an apparent air tracking. Results were discussed with the ordering physician. RECOMMENDATION: Clinical correlation. CT Head WO Contrast    Result Date: 8/17/2022  EXAMINATION: CT OF THE HEAD WITHOUT CONTRAST  8/17/2022 10:34 am TECHNIQUE: CT of the head was performed without the administration of intravenous contrast. Automated exposure control, iterative reconstruction, and/or weight based adjustment of the mA/kV was utilized to reduce the radiation dose to as low as reasonably achievable. COMPARISON: None.  HISTORY: ORDERING SYSTEM PROVIDED HISTORY: fall TECHNOLOGIST PROVIDED HISTORY: 8/17/2022  EXAMINATION: THREE XRAY VIEWS OF THE LEFT SHOULDER 8/17/2022 10:21 am COMPARISON: None. HISTORY: ORDERING SYSTEM PROVIDED HISTORY: fall TECHNOLOGIST PROVIDED HISTORY: Reason for exam:->fall FINDINGS: Glenohumeral joint is normally aligned. No evidence of acute fracture or dislocation. No abnormal periarticular calcifications. The [facility] joint is unremarkable in appearance. Visualized lung is unremarkable. No acute abnormality. US GALLBLADDER RUQ    Result Date: 8/17/2022  EXAMINATION: RIGHT UPPER QUADRANT ULTRASOUND 8/17/2022 5:35 pm COMPARISON: None. HISTORY: ORDERING SYSTEM PROVIDED HISTORY: shock liver TECHNOLOGIST PROVIDED HISTORY: Reason for exam:->shock liver What reading provider will be dictating this exam?->CRC FINDINGS: LIVER:  The liver demonstrates normal echogenicity without evidence of intrahepatic biliary ductal dilatation. BILIARY SYSTEM:  Gallbladder is unremarkable without evidence of pericholecystic fluid, wall thickening or stones. Negative sonographic Davies's sign. Common bile duct is within normal limits measuring . PANCREAS:  Visualized portions of the pancreas are unremarkable. OTHER: No evidence of right upper quadrant ascites. Unremarkable right upper quadrant ultrasound. US RETROPERITONEAL COMPLETE    Result Date: 8/17/2022  EXAMINATION: RETROPERITONEAL ULTRASOUND OF THE KIDNEYS AND URINARY BLADDER 8/17/2022 COMPARISON: None HISTORY: ORDERING SYSTEM PROVIDED HISTORY: ryne, r/o hydronephrosis TECHNOLOGIST PROVIDED HISTORY: Reason for exam:->ryne, r/o hydronephrosis What reading provider will be dictating this exam?->CRC FINDINGS: Kidneys: The right kidney measures 10.0 cm in length and the left kidney measures 11.6 cm in length. Bilateral echogenic renal cortices. No evidence of hydronephrosis or intrarenal stones. Bladder: Echogenic debris within the floor of the urinary bladder. Bilateral echogenic renal cortices.  Echogenic debris within the floor of the urinary bladder. Follow-up recommended. Assessment:    Principal Problem:    Hyperkalemia  Active Problems:    Acute kidney injury ([address]    Fall    IV infiltrate, initial encounter  Resolved Problems:    * No resolved hospital problems. *      Plan:  Traumatic Rhabdomyolysis- questionable mech Fall - Hyperkalemia, acidosis, HELGA worsening- Vigorous hydration, IVFs with bicarb, f/u CK, Nephrology on board, s/p  Ca+Gluc/HCO3/Regular Insulin/Lokelma, EKG with changes related to hyperkalemia. Transaminitis - ? Shock liver vs Drug overdose - utox positive for cocaine, fentanyl, tylenol level wnl, hepatitis panel, hiv, vdrl, INR, USG GB/Liver unremarkable, Ethanol <10, GI consulted. 3. Hyperglycemia - No known DM. Glucose 187 on admission. A1C - 5.9,  lipid panel wnl. Follow, currently on IVFs with dextrose. 4. Ruled out UTI - leukocytosis, UA abnormal, debris in bladder on US retroperitoneum,  urine cx negative. 5.  Contusions/ myalgias vs  Incipient compartment synd  ( ruled out per ortho)- - Left upper arm and left upper thigh, \"out of proportion pain \"swelling, decreased ROM, paresthesias, numbness, pain exacerbated by passive stretch left upper and lower extremity, very firm to palpation, increased girth relative to contralateral extremity, in the settings of rhabdomyolysis, hyperkalemia, acidosis, though pulses intact with intact motor strength is usually a late finding), consulted ortho for urgent consult, prn analgesics. Dopplers neg for clot, imaging neg for fractures. Will defer further surgical management including prophylactic fasciotomy to ortho surgeon per his expertise. Since pain improving will wean morphine 4 mg q4hrs to q 6hrs. 6. Scrotal swelling - Anasarca - stopped IVFs per Nephro, may need HD for vol management, vasc surgery consulted. Started on Bumex gtt, will benefit from albumin, defer to nephro. Rt femoral CVC for vascular access.      DVT prophylaxis - SCDs      NOTE: This report was transcribed using voice recognition software. Every effort was made to ensure accuracy; however, inadvertent computerized transcription errors may be present.   Electronically signed by Itz Hillman MD on 8/21/2022 at 9:03 AM

## 2022-08-21 NOTE — PROGRESS NOTES
PROGRESS NOTE  By MARIA A Cole    [facility]  Dr. Ray Rodrigues M.D.,  Dr. Kaleb Hannah M.D.,   Dr. Barbara Smalls D.O.,  Dr. Sean Conrad M.D.,  Dr. Elliot Preston D.O.,  Baker Cogan, D.O. Celestine Daniels  42 y.o.  male    SUBJECTIVE:  Complaints of left leg pain. Some left arm pain. OBJECTIVE:    BP (!) 143/87   Pulse 89   Temp 98.3 °F (36.8 °C) (Axillary)   Resp 18   Ht 5' 10\" (1.778 m)   Wt 216 lb 9.6 oz (98.2 kg)   SpO2 99%   BMI 31.08 kg/m²     General: NAD/-American male. HEENT: Anicteric sclera/moist oral mucosa  Neck: Appears supple with trachea midline  Chest: Symmetric excursion/nonlabored respirations  Cor: Regular  Abd.: Soft and nondistended  Extr.:  No peripheral edema  Skin: Warm and dry      DATA:    Monitor data reviewed -sinus rhythm noted. Lab Results   Component Value Date/Time    WBC 8.3 08/21/2022 05:39 AM    RBC 3.84 08/21/2022 05:39 AM    HGB 11.6 08/21/2022 05:39 AM    HCT 32.3 08/21/2022 05:39 AM    MCV 84.1 08/21/2022 05:39 AM    MCH 30.2 08/21/2022 05:39 AM    MCHC 35.9 08/21/2022 05:39 AM    RDW 13.5 08/21/2022 05:39 AM     08/21/2022 05:39 AM    MPV 9.6 08/21/2022 05:39 AM     Lab Results   Component Value Date/Time     08/21/2022 05:39 AM    K 4.4 08/21/2022 05:39 AM    K 4.2 08/20/2022 05:30 AM    CL 95 08/21/2022 05:39 AM    CO2 16 08/21/2022 05:39 AM    BUN 69 08/21/2022 05:39 AM    CREATININE 11.1 08/21/2022 05:39 AM    CALCIUM 6.7 08/21/2022 05:39 AM    PROT 4.7 08/21/2022 05:39 AM    LABALBU 2.2 08/21/2022 05:39 AM    BILITOT 0.5 08/21/2022 05:39 AM    ALKPHOS 57 08/21/2022 05:39 AM     08/21/2022 05:39 AM     08/21/2022 05:39 AM     No results found for: LIPASE  No results found for: AMYLASE      ASSESSMENT/PLAN:  Patient Active Problem List   Diagnosis    Hyperkalemia    Acute kidney injury ([address]    Fall    IV infiltrate, initial encounter         Assessment / Plan:    1. Transaminitis  -Likely from muscle origin with possible component of shock liver precipitated by LOC/cocaine  -Monitor liver synthetic function with INR and monitor patient mental status  -Await viral hepatitis serology  -Have low threshold for transfer to tertiary care facility in case of precipitous worsening in liver profile/function and mental status changes     2. Loss of consciousness/substance abuse/rhabdomyolysis/renal failure  -Per admitting/pertinent consultants      Improved LFTs. Hgb stable. No immediate interventions from GI POV. Leg pain per admitting. Monitor labs. Will follow. KARY Sinclair - CNP  8/21/2022  10:59 AM    NOTE:  This report was transcribed using voice recognition software. Every effort was made to ensure accuracy; however, inadvertent computerized transcription errors may be present.

## 2022-08-21 NOTE — PLAN OF CARE
Problem: Discharge Planning  Goal: Discharge to home or other facility with appropriate resources  Outcome: Not Progressing  Flowsheets (Taken 8/21/2022 1045)  Discharge to home or other facility with appropriate resources: Identify barriers to discharge with patient and caregiver

## 2022-08-21 NOTE — PROGRESS NOTES
Critical value creatinine of 11.1 was reported to Dr. Dario Pisano. He was also made aware that his scrotum and penis are very swollen. IV NS infusing at 200ml/hr was stopped.

## 2022-08-21 NOTE — PROGRESS NOTES
Patient refusing dialysis catheter. Explained reason for catheter and verbalized understanding but \"still doesn't want it. \"

## 2022-08-21 NOTE — PLAN OF CARE
Patient moaning out in pain. Called regarding patient complaints of scrotal swelling. Seen at bedside and assessed. Scrotum and penis edematous and tender to touch. Pain 10/10. Patient states pain medication not helping. Nursing to elevate scrotum. Bladder scan as needed. Insert fair if > 300 ml of urine in bladder. Will order one time dose of dilaudid for pain.

## 2022-08-21 NOTE — CONSULTS
Vascular Surgery Inpatient Consultation Note      Reason for Consultation: Hemodialysis access    HISTORY OF PRESENT ILLNESS:                The patient is a 42 y.o. male who is admitted to the hospital for treatment of rhabdomyolysis. The patient was found unresponsive and brought to the emergency department. He had hyperkalemia and shock liver. He has acute kidney injury and continued rising creatinine. He is felt to require hemodialysis. Vascular surgery is consulted for evaluation and treatment. IMPRESSION: Acute kidney injury. I reviewed the procedure of temporary dialysis catheter with the patient. I reviewed the risk, benefits, and alternatives. He understands and agrees. There are no plans for hemodialysis tonight therefore I will have one of the surgical residents insert a catheter tomorrow. RECOMMENDATIONS: Insertion temporary hemodialysis catheter tomorrow. Patient Active Problem List   Diagnosis Code    Hyperkalemia E87.5    Acute kidney injury [facility]) N17.9    Fall W19. XXXA    IV infiltrate, initial encounter T80. 1XXA       Past Medical History:   Diagnosis Date    Femur fracture ([facility] [address]         History reviewed. No pertinent surgical history. Current Medications:    bumetanide 0.1 mg/mL infusion 1 mg/hr (08/21/22 1401)    dextrose      sodium chloride      [Held by provider] sodium chloride Stopped (08/21/22 0745)      morphine, heparin flush, glucose, dextrose bolus **OR** dextrose bolus, glucagon (rDNA), dextrose, sodium chloride flush, sodium chloride, polyethylene glycol    sodium chloride flush  5-40 mL IntraVENous 2 times per day        Allergies:  Patient has no known allergies.     Social History     Socioeconomic History    Marital status: Single     Spouse name: Not on file    Number of children: Not on file    Years of education: Not on file    Highest education level: Not on file   Occupational History    Not on file   Tobacco Use    Smoking status: Every Day Packs/day: 0.50     Types: Cigarettes     Last attempt to quit: 2017     Years since quittin.6    Smokeless tobacco: Never   Vaping Use    Vaping Use: Every day   Substance and Sexual Activity    Alcohol use: No    Drug use: Yes     Comment: \"Pills\" unknown pill type    Sexual activity: Not on file   Other Topics Concern    Not on file   Social History Narrative    Not on file     Social Determinants of Health     Financial Resource Strain: Not on file   Food Insecurity: Not on file   Transportation Needs: Not on file   Physical Activity: Not on file   Stress: Not on file   Social Connections: Not on file   Intimate Partner Violence: Not on file   Housing Stability: Not on file        Family History   Problem Relation Age of Onset    Diabetes Mother     Hypertension Mother     Hypertension Father     Diabetes Father     Cancer Brother        REVIEW OF SYSTEMS:      Eyes:      Blurred vision:  No [x]/Yes []               Diplopia:   No [x]/Yes []               Vision loss:       No [x]/Yes []   Ears, nose, throat:             Hearing loss:    No [x]/Yes []      Vertigo:   No [x]/Yes []                       Swallowing problem:  No [x]/Yes []               Nose bleeds:   No [x]/Yes []      Voice hoarseness:  No [x]/Yes []  Respiratory:             Cough:   No [x]/Yes []      Pleuritic chest pain:  No [x]/Yes []                        Dyspnea:   No [x]/Yes []      Wheezing:   No [x]/Yes []  Cardiovascular:             Angina:   No [x]/Yes []      Palpitations:   No [x]/Yes []          Claudication:    No [x]/Yes []      Leg swelling:   No [x]/Yes []  Gastrointestinal:             Nausea or vomiting:  No [x]/Yes []               Abdominal pain:  No [x]/Yes []                     Intestinal bleeding: No [x]/Yes []  Musculoskeletal:             Leg pain:   No [x]/Yes []      Back pain:   No [x]/Yes []                    Weakness:   No [x]/Yes []  Neurologic:             Numbness:   No [x]/Yes []      Paralysis: No [x]/Yes []                       Headaches:   No [x]/Yes []  Hematologic, lymphatic:   Anemia:   No [x]/Yes []              Bleeding or bruising:  No [x]/Yes []              Fevers or chills: No [x]/Yes []  Endocrine:             Temp intolerance:   No [x]/Yes []                       Polydipsia, polyuria:  No [x]/Yes []  Skin:              Rash:    No [x]/Yes []      Ulcers:   No [x]/Yes []              Abnorm pigment: No [x]/Yes []  :              Frequency/urgency:  No [x]/Yes []      Hematuria:    No [x]/Yes []                      Incontinence:    No [x]/Yes []    PHYSICAL EXAM:  Vitals:    08/21/22 1530   BP: (!) 160/100   Pulse: 89   Resp: 18   Temp: 98 °F (36.7 °C)   SpO2: 95%     General Appearance: alert and oriented to person, place and time, in no acute distress, well developed and well- nourished  Neurologic: no cranial nerve deficit, speech normal  Head: normocephalic and atraumatic  Eyes: extraocular eye movements intact, conjunctivae normal  ENT: external ear and ear canal normal bilaterally, nose without deformity  Pulmonary/Chest: normal air movement, no respiratory distress  Cardiovascular: normal rate, regular rhythm  Abdomen: non-distended, no masses, +scrotal and penile edema.   Musculoskeletal: no joint deformity or tenderness  Extremities: no leg edema bilaterally  Skin: warm and dry, no rash or erythema    PULSE EXAM      Right      Left   Brachial     Radial     Femoral 3 3   Popliteal     Dorsalis Pedis     Posterior Tibial     (3=normal, 2=diminished, 1=barely palpable, 4=widened)      LABS:    Lab Results   Component Value Date    WBC 8.3 08/21/2022    HGB 11.6 (L) 08/21/2022    HCT 32.3 (L) 08/21/2022     08/21/2022    PROTIME 10.1 08/21/2022    INR 0.9 08/21/2022    K 4.4 08/21/2022    BUN 69 (H) 08/21/2022    CREATININE 11.1 (HH) 08/21/2022       RADIOLOGY:

## 2022-08-21 NOTE — PROGRESS NOTES
Department of Orthopedic Surgery  Dionisio Rogel MD      SUBJECTIVE  Pt seen and examined. Pain controlled. He relates his let arm is much improved. Use his left arm spontaneously. He relates his left leg is about the same. He relates his is getting up and using bathroom and uses left leg. OBJECTIVE    Physical    VITALS:  BP (!) 149/100   Pulse 80   Temp 98 °F (36.7 °C) (Axillary)   Resp 18   Ht 5' 10\" (1.778 m)   Wt 216 lb 9.6 oz (98.2 kg)   SpO2 98%   BMI 31.08 kg/m²     left LE:   Remains with fullness over lateral thigh and buttock area  Large scrotal edema present  Medial thigh soft and compressible  Anterior and lateral proximal thigh with fullness but compressible compartments  Disal thigh soft and compressible similar to contralateral thigh  Increased fullness to lower leg but compartment soft and compressible (new since last exam)  Foot with diffuse swelling but soft and compresible (new since last exam)  Deep peroneal and tibial nerve sensation intact. Foot pink and warm with palpable DP and PT pulses  No significant pain with ankle passive and active ROM  Passive knee ROM 0-110 degrees without significant pain (patient relates ROM feels better)    Data    CBC:   Lab Results   Component Value Date/Time    WBC 8.3 08/21/2022 05:39 AM    RBC 3.84 08/21/2022 05:39 AM    HGB 11.6 08/21/2022 05:39 AM    HCT 32.3 08/21/2022 05:39 AM    MCV 84.1 08/21/2022 05:39 AM    MCH 30.2 08/21/2022 05:39 AM    MCHC 35.9 08/21/2022 05:39 AM    RDW 13.5 08/21/2022 05:39 AM     08/21/2022 05:39 AM    MPV 9.6 08/21/2022 05:39 AM     PT/INR:    Lab Results   Component Value Date/Time    PROTIME 10.1 08/21/2022 05:39 AM    INR 0.9 08/21/2022 05:39 AM     EXAMINATION:   CT OF THE LEFT FEMUR WITHOUT CONTRAST 8/19/2022 12:24 pm       TECHNIQUE:   CT of the left femur was performed without the administration of intravenous   contrast.  Multiplanar reformatted images are provided for review.  Automated   exposure control, iterative reconstruction, and/or weight based adjustment of   the mA/kV was utilized to reduce the radiation dose to as low as reasonably   achievable. Dose: Total Exam DLP: 1279.27 mGy-cm. COMPARISON:   Left femur x-rays 08/18/2022       HISTORY   ORDERING SYSTEM PROVIDED HISTORY: pain   TECHNOLOGIST PROVIDED HISTORY:   Reason for exam:->pain       FINDINGS:   Bones: No evidence of acute fracture or dislocation. No aggressive appearing   osseous abnormality or periostitis. Soft Tissue: There is diffuse enlargement and edema involving all the muscle   groups of the left hip and thigh extending to the knee joint. Edema and   fluid is seen in between the muscular fascial planes and in the subcutaneous   tissue. No soft tissue gas is identified. No localized fluid collection is   seen, but evaluation limited without intravenous contrast.       Joint: No significant degenerative changes. No osseous erosions. Impression   Diffuse inflammation of the left hip and thigh skeletal muscle consistent   with myositis, which can be inflammatory, infectious, drug-related or   traumatic in etiology. No acute osseous abnormality.        RECOMMENDATIONS:   Unavailable     CT thigh reviewed: no discernable focal hematoma    ASSESSMENT   Resolved left arm IV infiltrate  Continued Left thigh focal swelling  Diffuse left leg swelling with soft and compressible compartments    PLAN    Continued left thigh focal swelling without discernable focal hematoma or clinical signs of compartment syndrome  Patient with continue Rhabodmyolysis  Patient was offered but declined debridement of focal left thigh swelling/fasciotomy of lateral compartment   Patient relates pain/swelling improving with continued conservative management  Electronically signed by Emely Loomis MD on 8/21/2022 at 1:00 PM

## 2022-08-22 ENCOUNTER — APPOINTMENT (OUTPATIENT)
Dept: GENERAL RADIOLOGY | Age: 37
DRG: 351 | End: 2022-08-22
Payer: MEDICAID

## 2022-08-22 ENCOUNTER — APPOINTMENT (OUTPATIENT)
Dept: ULTRASOUND IMAGING | Age: 37
DRG: 351 | End: 2022-08-22
Payer: MEDICAID

## 2022-08-22 LAB
ALBUMIN SERPL-MCNC: 2.5 G/DL (ref 3.5–5.2)
ALP BLD-CCNC: 55 U/L (ref 40–129)
ALT SERPL-CCNC: 347 U/L (ref 0–40)
ANION GAP SERPL CALCULATED.3IONS-SCNC: 18 MMOL/L (ref 7–16)
AST SERPL-CCNC: 485 U/L (ref 0–39)
BASOPHILS ABSOLUTE: 0.03 E9/L (ref 0–0.2)
BASOPHILS RELATIVE PERCENT: 0.3 % (ref 0–2)
BILIRUB SERPL-MCNC: 0.6 MG/DL (ref 0–1.2)
BILIRUBIN DIRECT: <0.2 MG/DL (ref 0–0.3)
BILIRUBIN, INDIRECT: ABNORMAL MG/DL (ref 0–1)
BUN BLDV-MCNC: 85 MG/DL (ref 6–20)
CALCIUM SERPL-MCNC: 7.7 MG/DL (ref 8.6–10.2)
CHLORIDE BLD-SCNC: 91 MMOL/L (ref 98–107)
CO2: 16 MMOL/L (ref 22–29)
CREAT SERPL-MCNC: 12.8 MG/DL (ref 0.7–1.2)
EOSINOPHILS ABSOLUTE: 0.27 E9/L (ref 0.05–0.5)
EOSINOPHILS RELATIVE PERCENT: 3.1 % (ref 0–6)
GFR AFRICAN AMERICAN: 5
GFR NON-AFRICAN AMERICAN: 5 ML/MIN/1.73
GLUCOSE BLD-MCNC: 78 MG/DL (ref 74–99)
HCT VFR BLD CALC: 37.8 % (ref 37–54)
HEMOGLOBIN: 13.3 G/DL (ref 12.5–16.5)
IMMATURE GRANULOCYTES #: 0.03 E9/L
IMMATURE GRANULOCYTES %: 0.3 % (ref 0–5)
LYMPHOCYTES ABSOLUTE: 1.73 E9/L (ref 1.5–4)
LYMPHOCYTES RELATIVE PERCENT: 19.7 % (ref 20–42)
MCH RBC QN AUTO: 29.8 PG (ref 26–35)
MCHC RBC AUTO-ENTMCNC: 35.2 % (ref 32–34.5)
MCV RBC AUTO: 84.6 FL (ref 80–99.9)
MONOCYTES ABSOLUTE: 0.5 E9/L (ref 0.1–0.95)
MONOCYTES RELATIVE PERCENT: 5.7 % (ref 2–12)
NEUTROPHILS ABSOLUTE: 6.24 E9/L (ref 1.8–7.3)
NEUTROPHILS RELATIVE PERCENT: 70.9 % (ref 43–80)
PDW BLD-RTO: 13.5 FL (ref 11.5–15)
PLATELET # BLD: 247 E9/L (ref 130–450)
PMV BLD AUTO: 9.5 FL (ref 7–12)
POTASSIUM SERPL-SCNC: 5.2 MMOL/L (ref 3.5–5)
RBC # BLD: 4.47 E12/L (ref 3.8–5.8)
SODIUM BLD-SCNC: 125 MMOL/L (ref 132–146)
TOTAL PROTEIN: 4.4 G/DL (ref 6.4–8.3)
WBC # BLD: 8.8 E9/L (ref 4.5–11.5)

## 2022-08-22 PROCEDURE — 6360000002 HC RX W HCPCS: Performed by: STUDENT IN AN ORGANIZED HEALTH CARE EDUCATION/TRAINING PROGRAM

## 2022-08-22 PROCEDURE — 85025 COMPLETE CBC W/AUTO DIFF WBC: CPT

## 2022-08-22 PROCEDURE — 71045 X-RAY EXAM CHEST 1 VIEW: CPT

## 2022-08-22 PROCEDURE — 80074 ACUTE HEPATITIS PANEL: CPT

## 2022-08-22 PROCEDURE — C1752 CATH,HEMODIALYSIS,SHORT-TERM: HCPCS

## 2022-08-22 PROCEDURE — 2580000003 HC RX 258: Performed by: ORTHOPAEDIC SURGERY

## 2022-08-22 PROCEDURE — 36415 COLL VENOUS BLD VENIPUNCTURE: CPT

## 2022-08-22 PROCEDURE — 90935 HEMODIALYSIS ONE EVALUATION: CPT

## 2022-08-22 PROCEDURE — 80076 HEPATIC FUNCTION PANEL: CPT

## 2022-08-22 PROCEDURE — 2500000003 HC RX 250 WO HCPCS: Performed by: INTERNAL MEDICINE

## 2022-08-22 PROCEDURE — 2060000000 HC ICU INTERMEDIATE R&B

## 2022-08-22 PROCEDURE — 93971 EXTREMITY STUDY: CPT

## 2022-08-22 PROCEDURE — 86706 HEP B SURFACE ANTIBODY: CPT

## 2022-08-22 PROCEDURE — 99233 SBSQ HOSP IP/OBS HIGH 50: CPT | Performed by: STUDENT IN AN ORGANIZED HEALTH CARE EDUCATION/TRAINING PROGRAM

## 2022-08-22 PROCEDURE — 2580000003 HC RX 258: Performed by: INTERNAL MEDICINE

## 2022-08-22 PROCEDURE — 51798 US URINE CAPACITY MEASURE: CPT

## 2022-08-22 PROCEDURE — 36556 INSERT NON-TUNNEL CV CATH: CPT

## 2022-08-22 PROCEDURE — 80048 BASIC METABOLIC PNL TOTAL CA: CPT

## 2022-08-22 PROCEDURE — 99233 SBSQ HOSP IP/OBS HIGH 50: CPT | Performed by: ORTHOPAEDIC SURGERY

## 2022-08-22 PROCEDURE — 5A1D70Z PERFORMANCE OF URINARY FILTRATION, INTERMITTENT, LESS THAN 6 HOURS PER DAY: ICD-10-PCS | Performed by: SPECIALIST

## 2022-08-22 RX ORDER — SODIUM CHLORIDE 0.9 % (FLUSH) 0.9 %
5-40 SYRINGE (ML) INJECTION PRN
Status: DISCONTINUED | OUTPATIENT
Start: 2022-08-22 | End: 2022-09-02 | Stop reason: HOSPADM

## 2022-08-22 RX ORDER — MORPHINE SULFATE 4 MG/ML
4 INJECTION, SOLUTION INTRAMUSCULAR; INTRAVENOUS ONCE
Status: COMPLETED | OUTPATIENT
Start: 2022-08-22 | End: 2022-08-31

## 2022-08-22 RX ORDER — SODIUM CHLORIDE 0.9 % (FLUSH) 0.9 %
5-40 SYRINGE (ML) INJECTION EVERY 12 HOURS SCHEDULED
Status: DISCONTINUED | OUTPATIENT
Start: 2022-08-22 | End: 2022-09-02 | Stop reason: HOSPADM

## 2022-08-22 RX ORDER — SODIUM CHLORIDE 9 MG/ML
INJECTION, SOLUTION INTRAVENOUS CONTINUOUS
Status: DISCONTINUED | OUTPATIENT
Start: 2022-08-22 | End: 2022-08-22

## 2022-08-22 RX ORDER — BUMETANIDE 1 MG/1
2 TABLET ORAL DAILY
Status: DISCONTINUED | OUTPATIENT
Start: 2022-08-22 | End: 2022-08-30

## 2022-08-22 RX ORDER — SODIUM CHLORIDE 9 MG/ML
INJECTION, SOLUTION INTRAVENOUS PRN
Status: DISCONTINUED | OUTPATIENT
Start: 2022-08-22 | End: 2022-09-02 | Stop reason: HOSPADM

## 2022-08-22 RX ADMIN — SODIUM CHLORIDE, PRESERVATIVE FREE 10 ML: 5 INJECTION INTRAVENOUS at 11:58

## 2022-08-22 RX ADMIN — MORPHINE SULFATE 4 MG: 4 INJECTION, SOLUTION INTRAMUSCULAR; INTRAVENOUS at 06:58

## 2022-08-22 RX ADMIN — MORPHINE SULFATE 4 MG: 4 INJECTION, SOLUTION INTRAMUSCULAR; INTRAVENOUS at 12:03

## 2022-08-22 RX ADMIN — MORPHINE SULFATE 4 MG: 4 INJECTION, SOLUTION INTRAMUSCULAR; INTRAVENOUS at 17:51

## 2022-08-22 RX ADMIN — MORPHINE SULFATE 4 MG: 4 INJECTION, SOLUTION INTRAMUSCULAR; INTRAVENOUS at 03:13

## 2022-08-22 RX ADMIN — Medication 10 ML: at 22:19

## 2022-08-22 RX ADMIN — BUMETANIDE 1 MG/HR: 0.25 INJECTION INTRAMUSCULAR; INTRAVENOUS at 01:01

## 2022-08-22 RX ADMIN — BUMETANIDE 1 MG/HR: 0.25 INJECTION INTRAMUSCULAR; INTRAVENOUS at 06:29

## 2022-08-22 RX ADMIN — Medication 10 ML: at 08:19

## 2022-08-22 RX ADMIN — MORPHINE SULFATE 4 MG: 4 INJECTION, SOLUTION INTRAMUSCULAR; INTRAVENOUS at 22:20

## 2022-08-22 RX ADMIN — MORPHINE SULFATE 4 MG: 4 INJECTION, SOLUTION INTRAMUSCULAR; INTRAVENOUS at 14:22

## 2022-08-22 ASSESSMENT — PAIN SCALES - GENERAL
PAINLEVEL_OUTOF10: 10
PAINLEVEL_OUTOF10: 6
PAINLEVEL_OUTOF10: 10
PAINLEVEL_OUTOF10: 9
PAINLEVEL_OUTOF10: 9
PAINLEVEL_OUTOF10: 10

## 2022-08-22 ASSESSMENT — PAIN DESCRIPTION - ORIENTATION
ORIENTATION: LEFT
ORIENTATION: LEFT

## 2022-08-22 ASSESSMENT — PAIN DESCRIPTION - DESCRIPTORS
DESCRIPTORS: ACHING
DESCRIPTORS: ACHING

## 2022-08-22 ASSESSMENT — PAIN DESCRIPTION - LOCATION: LOCATION: GROIN;BUTTOCKS

## 2022-08-22 NOTE — PROGRESS NOTES
[facility] MD Luz Castrejon, MD Florina Post, CNP   Linda Anguiano, ANGELO  Progress Note    8/22/2022    SUBJECTIVE:   8/18: Complaining of pain in his left upper and lower extremities. Tingling in both extremities. Swelling, though in the left upper extremity due to infiltrated IV. (-) sob on room air. No cp/palp Appetite poor    8/20 seen in her room on RA , alert oriented cooperative stable bp .     8/21 low UO swelling noted     8/22 getting temp IJ HD line then plan daily HD for solute and volume management   PROBLEM LIST:    Principal Problem:    Hyperkalemia  Active Problems:    Acute kidney injury ([address]    Fall    IV infiltrate, initial encounter  Resolved Problems:    * No resolved hospital problems. *         DIET:    ADULT DIET;  Regular     MEDS (scheduled):    sodium chloride flush  5-40 mL IntraVENous 2 times per day    ceFAZolin (ANCEF) IVPB  2,000 mg IntraVENous On Call to OR    morphine  4 mg IntraVENous Once    bumetanide  2 mg Oral Daily    sodium chloride flush  5-40 mL IntraVENous 2 times per day       MEDS (infusions):   sodium chloride      dextrose      sodium chloride      [Held by provider] sodium chloride Stopped (08/21/22 0745)       MEDS (prn):  sodium chloride flush, sodium chloride, morphine, heparin flush, glucose, dextrose bolus **OR** dextrose bolus, glucagon (rDNA), dextrose, sodium chloride flush, sodium chloride, polyethylene glycol    PHYSICAL EXAM:     Patient Vitals for the past 24 hrs:   BP Temp Temp src Pulse Resp SpO2 Weight   08/22/22 1730 (!) 162/100 98 °F (36.7 °C) Axillary 85 18 -- --   08/22/22 1700 (!) 185/119 97.4 °F (36.3 °C) -- 75 16 -- 209 lb 3.5 oz (94.9 kg)   08/22/22 1630 (!) 180/107 -- -- 77 -- -- --   08/22/22 1600 (!) 186/122 -- -- 72 -- -- --   08/22/22 1530 (!) 198/110 -- -- 70 -- -- --   08/22/22 1500 (!) 176/117 -- -- 66 -- -- --   08/22/22 1430 (!) 194/117 -- -- 67 -- -- --   08/22/22 1422 severe, total CK on presentation 398,000, improving to total CK 8/18 morning 155,080. Awaiting reporting of this morning's true total CK (currently just listed as greater than 22,000). Does not appear to have compartment syndrome in either left upper or lower extremity, though both are quite sore, and he complains of tingling. 3.  Hyperkalemia due to HELGA, and rhabdomyolysis. 4.  Hyperphosphatemia, due to rhabdomyolysis, HELGA    5.   Transaminitis, improving    Plan :      Azotemia progressing with pt becoming hyper volemic and continue to be oligouric   Temp RIJ HD line 8/22     Plan daily HD for solute and volume management   Bumex 2 mg po daily   Daily bmp , ck   Fu signs of recovery           Electronically signed by Cyrus Fraser MD on 8/22/2022

## 2022-08-22 NOTE — PROGRESS NOTES
Secure message sent to surg resident on call re:temp. Hd cath insertion per instruction of dr romero(on 8/21). Also secure message sent to resident re:replacement of femoral tl due to no blood return. flushes without difficulty but no blood return in any of the 3 ports.

## 2022-08-22 NOTE — CONSULTS
Orthopaedic Consultation  Dakota Lopez MD      CHIEF COMPLAINT:  Pain in groin and left buttocks > thigh > lower leg    History of Present Illness: 40yo male admitted 8/17 w/rhabdomyolysis following LOC due to drug use. He has developed diffuse edema concentrated within the perineum and buttocks as well as the left thigh and leg. There is pain predominantly in the perineum and buttocks followed by the left lateral thigh. His \"lower leg feels swollen but not really painful\". I have been asked to see him for a 2nd opinion to rule out compartment syndrome. He had dialysis today 2-5PM for the first time to address acute renal failure due to rhabdomyolysis. I did speak directly to Dr. Dianne Hidalgo earlier today who has been intimately involved in his care. Past Medical History:   Diagnosis Date    Femur fracture ([address]          History reviewed. No pertinent surgical history. Medications Prior to Admission:    No medications prior to admission. Allergies:    Patient has no known allergies. Social History:    reports that he has been smoking cigarettes. He has been smoking an average of .5 packs per day. He has never used smokeless tobacco. He reports current drug use. He reports that he does not drink alcohol. Family History:   family history includes Cancer in his brother; Diabetes in his father and mother; Hypertension in his father and mother. REVIEW OF SYSTEMS:  As above in the HPI, otherwise negative    PHYSICAL EXAM:    Vitals:  BP (!) 162/100   Pulse 85   Temp 98 °F (36.7 °C) (Axillary)   Resp 18   Ht 5' 10\" (1.778 m)   Wt 209 lb 3.5 oz (94.9 kg)   SpO2 95%   BMI 30.02 kg/m²     General:  Awake, alert, oriented X 3. Well developed, well nourished, well groomed. Uncomfortable but no apparent distress. Family at bedside. HEENT:  Normocephalic, atraumatic. Pupils equal, round, reactive to light.     Neck:  Supple  Heart:  RRR  Lungs:  CTA bilaterally, bilat symmetrical expansion  Abdomen: Bowel sounds present, soft, nontender,  Extremities: The buttocks and LLE have diffuse symmetric soft tissue edema circumferential.  There is discomfort w/deep palpation of the gluteal area, lateral thigh > posterior thigh and knee. 1+ knee effusion. Lower leg swelling is less pronounced and less tender. There to is no w/passive DF of the ankle or toes. Compartments are compressible. No skin blisters yet. Skin:  Warm and dry, no open lesions or rash  Neuro:  Cranial nerves 2-12 intact, no focal deficits  Breast: deferred  Rectal: deferred  Genitalia:  Diffuse edema - scrotal and penis. (+) fair catheter in place.      LABS:  CBC with Differential:    Lab Results   Component Value Date/Time    WBC 8.8 08/22/2022 11:50 AM    RBC 4.47 08/22/2022 11:50 AM    HGB 13.3 08/22/2022 11:50 AM    HCT 37.8 08/22/2022 11:50 AM     08/22/2022 11:50 AM    MCV 84.6 08/22/2022 11:50 AM    MCH 29.8 08/22/2022 11:50 AM    MCHC 35.2 08/22/2022 11:50 AM    RDW 13.5 08/22/2022 11:50 AM    LYMPHOPCT 19.7 08/22/2022 11:50 AM    MONOPCT 5.7 08/22/2022 11:50 AM    BASOPCT 0.3 08/22/2022 11:50 AM    MONOSABS 0.50 08/22/2022 11:50 AM    LYMPHSABS 1.73 08/22/2022 11:50 AM    EOSABS 0.27 08/22/2022 11:50 AM    BASOSABS 0.03 08/22/2022 11:50 AM     BMP:    Lab Results   Component Value Date/Time     08/22/2022 11:50 AM    K 5.2 08/22/2022 11:50 AM    K 4.2 08/20/2022 05:30 AM    CL 91 08/22/2022 11:50 AM    CO2 16 08/22/2022 11:50 AM    BUN 85 08/22/2022 11:50 AM    LABALBU 2.5 08/22/2022 11:50 AM    CREATININE 12.8 08/22/2022 11:50 AM    CALCIUM 7.7 08/22/2022 11:50 AM    GFRAA 5 08/22/2022 11:50 AM    LABGLOM 5 08/22/2022 11:50 AM    GLUCOSE 78 08/22/2022 11:50 AM     BUN/Creatinine:    Lab Results   Component Value Date/Time    BUN 85 08/22/2022 11:50 AM    CREATININE 12.8 08/22/2022 11:50 AM     Albumin:    Lab Results   Component Value Date/Time    LABALBU 2.5 08/22/2022 11:50 AM     LDH:  No results found for: LDH  Uric Acid:  No results found for: Leti Kin  PT/INR:    Lab Results   Component Value Date/Time    PROTIME 10.1 08/21/2022 05:39 AM    INR 0.9 08/21/2022 05:39 AM     PTT:  No results found for: APTT, PTT[APTT}      ASSESSMENT:      Patient Active Problem List   Diagnosis    Hyperkalemia    Acute kidney injury ([address]    Fall    IV infiltrate, initial encounter       PLAN:    Diffuse LLE edema affecting the perineum, buttocks and left thigh. To a lesser extent, the left lower leg is mildly swollen as well. His biggest concern right now is his perineum and his catheter. He believes that the buttocks area has worsened due to inability to stand, sit or ambulate. I do not think that he has clinical compartment syndrome in need of fasciotomy at this time. I think that close observation is fine and that with dialysis, time and mobilization, these symptoms should improve. I did explain rationale for concern re - compartment syndrome and potential need for fasciotomy if condition worsens. Patient and family are comfortable w/plan of care. Will discuss again w/Dr. Layo Enrique MD

## 2022-08-22 NOTE — PROGRESS NOTES
[facility] Progress Note    Admitting Date and Time: 8/17/2022  9:25 AM  Admit Dx: Hyperkalemia [E87.5]  Shock liver [K72.00]  Acute kidney injury ([address] [N17.9]  Fall, initial encounter [W19. XXXA]  Traumatic rhabdomyolysis, initial encounter ([facility] [place]. 6XXA]  Leukocytosis, unspecified type [D72.829]    Subjective:  Patient is being followed for Hyperkalemia [E87.5]  Shock liver [K72.00]  Acute kidney injury ([facility] [N17.9]  Fall, initial encounter [W19. XXXA]  Traumatic rhabdomyolysis, initial encounter ([facility] [place]. 6XXA]  Leukocytosis, unspecified type [D72.829]   Pt reports excruciating pain in scrotal area and hip region. Per RN: No major concerns, getting temporary HD cath dialyzed, getting fasciotomy today per otho.     ROS: denies fever, chills, cp, sob, n/v, HA unless stated above.      sodium chloride flush  5-40 mL IntraVENous 2 times per day    ceFAZolin (ANCEF) IVPB  2,000 mg IntraVENous On Call to OR    sodium chloride flush  5-40 mL IntraVENous 2 times per day     sodium chloride flush, 5-40 mL, PRN  sodium chloride, , PRN  morphine, 4 mg, Q4H PRN  heparin flush, 100 Units, PRN  glucose, 4 tablet, PRN  dextrose bolus, 125 mL, PRN   Or  dextrose bolus, 250 mL, PRN  glucagon (rDNA), 1 mg, PRN  dextrose, , Continuous PRN  sodium chloride flush, 5-40 mL, PRN  sodium chloride, , PRN  polyethylene glycol, 17 g, Daily PRN       Objective:    BP (!) 150/90   Pulse 80   Temp 98 °F (36.7 °C) (Axillary)   Resp 18   Ht 5' 10\" (1.778 m)   Wt 216 lb 9.6 oz (98.2 kg)   SpO2 95%   BMI 31.08 kg/m²     General Appearance: alert and oriented to person, place and time and in mod acute distress from pain  Skin: warm and dry, bruises left face, tattoos  Head: normocephalic and atraumatic  Eyes: pupils equal, round, and reactive to light, extraocular eye movements intact, conjunctivae normal  Neck: neck supple and non tender without mass   Pulmonary/Chest: clear to auscultation bilaterally- no wheezes, rales or rhonchi, normal air movement, no respiratory distress  Cardiovascular: normal rate, normal S1 and S2 and no carotid bruits  Abdomen: soft, non-tender, non-distended, normal bowel sounds, no masses or organomegaly, scrotal swelling  Extremities: no cyanosis, no clubbing and + edema, swelling, restricted ROM, pain exacerbated by passive stretch left upper and lower extremity, firm to palpation, paresthesias, numbness+, distal pulses palpable, rt femoral cvc  Neurologic: no cranial nerve deficit and speech normal      Recent Labs     08/20/22  0530 08/21/22  0539 08/22/22  1150   * 125* 125*   K 4.2 4.4 5.2*   CL 92* 95* 91*   CO2 18* 16* 16*   BUN 61* 69* 85*   CREATININE 9.8* 11.1* 12.8*   GLUCOSE 83 87 78   CALCIUM 6.4* 6.7* 7.7*       Recent Labs     08/20/22  0530 08/21/22  0539 08/22/22  1150   WBC 8.1 8.3 8.8   RBC 3.55* 3.84 4.47   HGB 10.5* 11.6* 13.3   HCT 29.5* 32.3* 37.8   MCV 83.1 84.1 84.6   MCH 29.6 30.2 29.8   MCHC 35.6* 35.9* 35.2*   RDW 13.9 13.5 13.5    221 247   MPV 9.5 9.6 9.5       Micro:  No components found for: [facility])    Radiology:   XR PELVIS (1-2 VIEWS)    Result Date: 8/17/2022  EXAMINATION: ONE XRAY VIEW OF THE PELVIS 8/17/2022 10:21 am COMPARISON: None. HISTORY: ORDERING SYSTEM PROVIDED HISTORY: fall TECHNOLOGIST PROVIDED HISTORY: Reason for exam:->fall FINDINGS: No evidence of pelvic fracture. Bilateral hips demonstrate normal alignment. No focal osseous lesion. SI joints are symmetric. No acute abnormality of the pelvis. XR HUMERUS LEFT (MIN 2 VIEWS)    Result Date: 8/17/2022  EXAMINATION: TWO XRAY VIEWS OF THE LEFT HUMERUS 8/17/2022 7:58 pm COMPARISON: None. HISTORY: ORDERING SYSTEM PROVIDED HISTORY: Pain TECHNOLOGIST PROVIDED HISTORY: Reason for exam:->Pain FINDINGS: Alignment: No traumatic malalignment. Osseous: Intact without a definite acute fracture identified. Joints: No significant arthritic disease is present. Other:  There is prominent appearing soft tissue swelling and apparently linear air anteriorly, advise clinical correlation. .     No acute fracture is identified. Prominent soft tissue swelling an apparent air tracking in the antecubital region. RECOMMENDATION: Clinical correlation. XR ELBOW LEFT (MIN 3 VIEWS)    Result Date: 8/17/2022  EXAMINATION: THREE X-RAY VIEWS OF THE LEFT ELBOW 8/17/2022 7:58 pm COMPARISON: None HISTORY: ORDERING SYSTEM PROVIDED HISTORY: Pain TECHNOLOGIST PROVIDED HISTORY: Reason for Exam:  Pain FINDINGS: Alignment: No traumatic malalignment. Osseous: Intact without a definite acute fracture identified. Joints: No significant arthritic disease is present. Other: There is prominent appearing soft tissue swelling and apparently linear air anteriorly, advise clinical correlation. No acute fracture is identified. Prominent soft tissue swelling and apparent air tracking. RECOMMENDATION: Clinical correlation. XR RADIUS ULNA LEFT (2 VIEWS)    Result Date: 8/17/2022  EXAMINATION: TWO XRAY VIEWS OF THE LEFT FOREARM 8/17/2022 7:58 pm COMPARISON: None. HISTORY: ORDERING SYSTEM PROVIDED HISTORY: Pain TECHNOLOGIST PROVIDED HISTORY: Reason for exam:->Pain FINDINGS: Alignment: No traumatic malalignment. Osseous: Intact without a definite acute fracture identified. Joints: No significant arthritic disease is present. Other: There is prominent appearing soft tissue swelling and apparently linear air anteriorly, advise clinical correlation for relating to procedure versus complication. .. No acute fracture is identified. Prominent soft tissue swelling an apparent air tracking. Results were discussed with the ordering physician. RECOMMENDATION: Clinical correlation.      CT Head WO Contrast    Result Date: 8/17/2022  EXAMINATION: CT OF THE HEAD WITHOUT CONTRAST  8/17/2022 10:34 am TECHNIQUE: CT of the head was performed without the administration of intravenous contrast. Automated exposure control, iterative reconstruction, and/or weight based adjustment of the mA/kV was utilized to reduce the radiation dose to as low as reasonably achievable. COMPARISON: None. HISTORY: ORDERING SYSTEM PROVIDED HISTORY: fall TECHNOLOGIST PROVIDED HISTORY: Reason for exam:->fall Has a \"code stroke\" or \"stroke alert\" been called? ->No Decision Support Exception - unselect if not a suspected or confirmed emergency medical condition->Emergency Medical Condition (MA) FINDINGS: BRAIN/VENTRICLES: There is no acute intracranial hemorrhage, mass effect or midline shift. No abnormal extra-axial fluid collection. The gray-white differentiation is maintained without evidence of an acute infarct. There is no evidence of hydrocephalus. ORBITS: The visualized portion of the orbits demonstrate no acute abnormality. SINUSES: The visualized paranasal sinuses and mastoid air cells demonstrate no acute abnormality. SOFT TISSUES/SKULL:  No acute abnormality of the visualized skull or soft tissues. No acute intracranial abnormality. CT Cervical Spine WO Contrast    Result Date: 8/17/2022  EXAMINATION: CT OF THE CERVICAL SPINE WITHOUT CONTRAST 8/17/2022 10:34 am TECHNIQUE: CT of the cervical spine was performed without the administration of intravenous contrast. Multiplanar reformatted images are provided for review. Automated exposure control, iterative reconstruction, and/or weight based adjustment of the mA/kV was utilized to reduce the radiation dose to as low as reasonably achievable. COMPARISON: None. HISTORY: ORDERING SYSTEM PROVIDED HISTORY: fall TECHNOLOGIST PROVIDED HISTORY: Reason for exam:->fall Decision Support Exception - unselect if not a suspected or confirmed emergency medical condition->Emergency Medical Condition (MA) FINDINGS: BONES/ALIGNMENT: There is no acute fracture or traumatic malalignment. There is straightening of lordotic curvature likely due to muscular spasm and/or positioning of the neck.  DEGENERATIVE CHANGES: No significant degenerative changes. SOFT TISSUES: There is no prevertebral soft tissue swelling. Lung apices: Small right apical bullae. No acute abnormality of the cervical spine. XR SHOULDER LEFT (MIN 2 VIEWS)    Result Date: 8/17/2022  EXAMINATION: THREE XRAY VIEWS OF THE LEFT SHOULDER 8/17/2022 10:21 am COMPARISON: None. HISTORY: ORDERING SYSTEM PROVIDED HISTORY: fall TECHNOLOGIST PROVIDED HISTORY: Reason for exam:->fall FINDINGS: Glenohumeral joint is normally aligned. No evidence of acute fracture or dislocation. No abnormal periarticular calcifications. The Annitta Began joint is unremarkable in appearance. Visualized lung is unremarkable. No acute abnormality. US GALLBLADDER RUQ    Result Date: 8/17/2022  EXAMINATION: RIGHT UPPER QUADRANT ULTRASOUND 8/17/2022 5:35 pm COMPARISON: None. HISTORY: ORDERING SYSTEM PROVIDED HISTORY: shock liver TECHNOLOGIST PROVIDED HISTORY: Reason for exam:->shock liver What reading provider will be dictating this exam?->CRC FINDINGS: LIVER:  The liver demonstrates normal echogenicity without evidence of intrahepatic biliary ductal dilatation. BILIARY SYSTEM:  Gallbladder is unremarkable without evidence of pericholecystic fluid, wall thickening or stones. Negative sonographic Davies's sign. Common bile duct is within normal limits measuring . PANCREAS:  Visualized portions of the pancreas are unremarkable. OTHER: No evidence of right upper quadrant ascites. Unremarkable right upper quadrant ultrasound. US RETROPERITONEAL COMPLETE    Result Date: 8/17/2022  EXAMINATION: RETROPERITONEAL ULTRASOUND OF THE KIDNEYS AND URINARY BLADDER 8/17/2022 COMPARISON: None HISTORY: ORDERING SYSTEM PROVIDED HISTORY: ryne, r/o hydronephrosis TECHNOLOGIST PROVIDED HISTORY: Reason for exam:->ryne, r/o hydronephrosis What reading provider will be dictating this exam?->CRC FINDINGS: Kidneys: The right kidney measures 10.0 cm in length and the left kidney measures 11.6 cm in length.  Bilateral echogenic renal cortices. No evidence of hydronephrosis or intrarenal stones. Bladder: Echogenic debris within the floor of the urinary bladder. Bilateral echogenic renal cortices. Echogenic debris within the floor of the urinary bladder. Follow-up recommended. Assessment:    Principal Problem:    Hyperkalemia  Active Problems:    Acute kidney injury ([address]    Fall    IV infiltrate, initial encounter  Resolved Problems:    * No resolved hospital problems. *      Plan:  Traumatic Rhabdomyolysis- questionable mech Fall - Hyperkalemia, acidosis, HELGA worsening- Vigorous hydration, IVFs with Bumex gtt, f/u CK, Nephrology on board, s/p  Ca+Gluc/HCO3/Regular Insulin/Lokelma, EKG with changes related to hyperkalemia. Transaminitis - ? Shock liver vs Drug overdose - utox positive for cocaine, fentanyl, tylenol level wnl, hepatitis panel, hiv, vdrl, INR, USG GB/Liver unremarkable, Ethanol <10, GI consulted. 3. Hyperglycemia - No known DM. Glucose 187 on admission. A1C - 5.9,  lipid panel wnl. Follow, currently on IVFs with dextrose. 4. Ruled out UTI - leukocytosis, UA abnormal, debris in bladder on US retroperitoneum,  urine cx negative. 5.  Contusions/ myalgias vs  Incipient compartment synd  ( ruled out per ortho)- - Left upper arm and left upper thigh, \"out of proportion pain \"swelling, decreased ROM, paresthesias, numbness, pain exacerbated by passive stretch left upper and lower extremity, very firm to palpation, increased girth relative to contralateral extremity, in the settings of rhabdomyolysis, hyperkalemia, acidosis, though pulses intact with intact motor strength is usually a late finding), consulted ortho for urgent consult, prn analgesics. Dopplers neg for clot, imaging neg for fractures. Will defer further surgical management including prophylactic fasciotomy to ortho surgeon per his expertise. Since pain improving will wean morphine 4 mg q4hrs to q 6hrs.    6. Scrotal swelling - Anasarca - stopped IVFs per Nephro, Got temp HD cath, getting started on HD, for vol management, vasc surgery consulted. Started on Bumex gtt, . Rt femoral CVC for vascular access. DVT prophylaxis - SCDs      NOTE: This report was transcribed using voice recognition software. Every effort was made to ensure accuracy; however, inadvertent computerized transcription errors may be present.   Electronically signed by Valentina Garcia MD on 8/22/2022 at 1:23 PM

## 2022-08-22 NOTE — PROGRESS NOTES
Dr. Estrada [facility] office notified of new consult for second opinion per family request for left leg compartment, syndrome

## 2022-08-22 NOTE — PROGRESS NOTES
Patient's mother called requesting no surgery be performed on her son's lower extremity. She relates that she would like to try dialysis first.  I did relate to her that dialysis will not affect concern for possible worsening signs of compartment syndrome. I have related to her that his ultrasound was negative for DVT and given his increased pain and swelling since yesterday I am concerned for compartment syndrome and long-term sequelae of loss of use of the extremity. She voiced understanding and still relates that she and her son do not wish to pursue surgery at this time. She understands the concerns and risk involved with continued observation of his lower extremity swelling. In addition she is requesting a second opinion with another physician. I strongly encourage a second opinion and related this to her. At this time we will cancel his surgery for 4:00 PM today. We will continue observation and obtain a second opinion at his mother's request in regards to his lower extremity.     Electronically signed by Indu Yeboah MD on 8/22/2022 at 2:11 PM

## 2022-08-22 NOTE — CARE COORDINATION
Patient off the floor during rounds. Will see later today or tomorrow. Please, call with questions/concerns. Thank you.     Tena Sampson MD

## 2022-08-22 NOTE — PROGRESS NOTES
Secure message sent to ortho(dr lanza) re: poss fasciotomy. patient \"voicing wanting to do one procedure at a time\" voices wanting to \"get through dialysis cath insertion and dialysis treatment first\" to keep npo per response.

## 2022-08-22 NOTE — CARE COORDINATION
For temporary dialysis cath insertion- await renal plan regarding OPT dialysis/ chair time. Plan remains to return home w/ his mother on discharge. On Bumex drip. Still requiring ivf and iv pain med. Does not have a PCP so would not be eligible for [address] on discharge- PCP Pre-service # on discharge instructions. Declining Addiction recovery rehab or info.  Will follow Hortensia Dee RNcase manager

## 2022-08-22 NOTE — PROGRESS NOTES
Department of Orthopedic Surgery  Conor Conde MD      SUBJECTIVE  Pt seen and examined. Complains of worsening pain and localizes to scrotum. Also complains of worsening pain and swelling in left leg. Moving and using left arm. OBJECTIVE    Physical    VITALS:  BP (!) 170/90   Pulse 85   Temp 98 °F (36.7 °C) (Oral)   Resp 18   Ht 5' 10\" (1.778 m)   Wt 216 lb 9.6 oz (98.2 kg)   SpO2 96%   BMI 31.08 kg/m²     left LE:   Remains with fullness over lateral thigh and buttock area  Large scrotal edema present  Medial thigh soft and compressible  Anterior and lateral proximal thigh with fullness but compressible compartments  Distal thigh soft and compressible similar to contralateral thigh  Increased fullness to lower leg but compartment compressible   Foot with diffuse swelling but soft and compresible   Deep peroneal and tibial nerve sensation intact. Foot pink and warm with palpable DP and PT pulses  pain with ankle passive and active ROM  Passive knee ROM 0-45 degrees with moderate pain     Data    CBC:   Lab Results   Component Value Date/Time    WBC 8.3 08/21/2022 05:39 AM    RBC 3.84 08/21/2022 05:39 AM    HGB 11.6 08/21/2022 05:39 AM    HCT 32.3 08/21/2022 05:39 AM    MCV 84.1 08/21/2022 05:39 AM    MCH 30.2 08/21/2022 05:39 AM    MCHC 35.9 08/21/2022 05:39 AM    RDW 13.5 08/21/2022 05:39 AM     08/21/2022 05:39 AM    MPV 9.6 08/21/2022 05:39 AM     PT/INR:    Lab Results   Component Value Date/Time    PROTIME 10.1 08/21/2022 05:39 AM    INR 0.9 08/21/2022 05:39 AM     EXAMINATION:   ULTRASOUND OF THE SCROTUM/TESTICLES WITH COLOR DOPPLER FLOW EVALUATION       8/21/2022       TECHNIQUE:   Duplex ultrasound using B-mode/gray scaled imaging, Doppler spectral analysis   and color flow Doppler was obtained of the testicles. COMPARISON:   None.        HISTORY:   ORDERING SYSTEM PROVIDED HISTORY: scrotum edema and pain   TECHNOLOGIST PROVIDED HISTORY:       Reason for exam:->scrotum edema and pain   What reading provider will be dictating this exam?->CRC       FINDINGS:       Measurements:       Right Testicle: 4.6 x 3.2 x 2.9 cm       Left Testicle: 3.7 x 2.3 x 2.7 cm           Right:       Grey Scale: The right testicle demonstrates normal homogeneous echotexture   without focal lesion. No evidence of testicular microlithiasis. Doppler Evaluation: There is normal arterial and venous Doppler flow within   the testicle. Scrotal Sac: No evidence of hydrocele. There is marked edematous scrotal   wall swelling measuring up to 4.1 cm in thickness. Epididymis: No acute abnormality. Left:       Grey Scale: The left testicle demonstrates normal homogeneous echotexture   without focal lesion. No evidence of testicular microlithiasis. Doppler Evaluation: There is normal arterial and venous Doppler flow within   the testicle. Scrotal Sac: No evidence of hydrocele. There is marked edematous scrotal   wall swelling measuring up to 4.6 cm in thickness. Epididymis: No acute abnormality. Impression   Marked edematous diffuse scrotal wall swelling. No testicular or epididymal abnormality identified. ASSESSMENT   Left lower extremity worsening swelling and pain      PLAN    STAT venous ultrasound to rule out DVT  Discussed fasciotomy in detail: patient now wishes to consider fasciotomy given progressive swelling and pain. He understands this will not address his increased scrotal swelling/pain. Compartments remain compressible at this time.   NPO  Plan for fasciotomy of leg and lateral thigh later today      I explained the risks, benefits, alternatives and complications of surgery with the patient including but not limited to the risks of infection, possible damage to nerves, vessels, or tendons, stiffness, loss of range of motion, scar sensitivity, wound healing complications, worsening symptoms, possible need for therapy, as well as the possible need further surgery and unanticipated complications. The patient voiced understanding and all questions were answered. The patient elected to proceed with surgical intervention.      Daniel Mccarthy MD  8/22/2022

## 2022-08-22 NOTE — PROCEDURES
Pia Roberson is a 42 y.o. male patient. 1. Hyperkalemia    2. Traumatic rhabdomyolysis, initial encounter ([facility]    3. Shock liver    4. Fall, initial encounter    5. Leukocytosis, unspecified type    6. Acute kidney injury ([facility]    7. IV infiltrate, initial encounter      Past Medical History:   Diagnosis Date    Femur fracture (HCC)      Blood pressure (!) 150/90, pulse 80, temperature 98 °F (36.7 °C), temperature source Axillary, resp. rate 18, height 5' 10\" (1.778 m), weight 216 lb 9.6 oz (98.2 kg), SpO2 95 %. Temporary HD catheter placement    Date/Time: 8/22/2022 11:21 AM  Performed by: Jeronimo Gates MD  Authorized by: Jeronimo Gates MD   Consent: Verbal consent obtained. Written consent obtained. Risks and benefits: risks, benefits and alternatives were discussed  Consent given by: patient  Patient understanding: patient states understanding of the procedure being performed  Patient consent: the patient's understanding of the procedure matches consent given  Procedure consent: procedure consent matches procedure scheduled  Relevant documents: relevant documents present and verified  Test results: test results available and properly labeled  Site marked: the operative site was marked  Imaging studies: imaging studies available  Patient identity confirmed: verbally with patient  Time out: Immediately prior to procedure a \"time out\" was called to verify the correct patient, procedure, equipment, support staff and site/side marked as required. Indications: Hemodialysis.   Anesthesia: local infiltration    Anesthesia:  Local Anesthetic: lidocaine 1% with epinephrine    Sedation:  Patient sedated: no    Preparation: skin prepped with ChloraPrep  Skin prep agent dried: skin prep agent completely dried prior to procedure  Sterile barriers: all five maximum sterile barriers used - cap, mask, sterile gown, sterile gloves, and large sterile sheet  Hand hygiene: hand hygiene performed prior to central venous catheter insertion  Location details: right internal jugular  Site selection rationale: Edema prohibitive of femoral line placement  Patient position: flat  Catheter type: double lumen  Catheter size: 14 Fr  Pre-procedure: landmarks identified  Ultrasound guidance: yes  Sterile ultrasound techniques: sterile gel and sterile probe covers were used  Number of attempts: 2  Successful placement: yes  Post-procedure: line sutured and dressing applied  Assessment: blood return through all ports  Patient tolerance: patient tolerated the procedure well with no immediate complications  Comments: Post procedure CXR pending    Dr. Anneliese Acosta was immediately available for the procedure.          Melissa Jones MD  8/22/2022

## 2022-08-22 NOTE — PROGRESS NOTES
[facility] MD Kvng Lennon MD Junius Beck, MD Missie Slicker, HUMBERTO Anguiano, ANGELO  Progress Note    8/22/2022    SUBJECTIVE:   8/18: Complaining of pain in his left upper and lower extremities. Tingling in both extremities. Swelling, though in the left upper extremity due to infiltrated IV. (-) sob on room air. No cp/palp Appetite poor    8/20 seen in her room on RA , alert oriented cooperative stable bp .     8/21 low UO swelling noted   PROBLEM LIST:    Principal Problem:    Hyperkalemia  Active Problems:    Acute kidney injury ([address]    Fall    IV infiltrate, initial encounter  Resolved Problems:    * No resolved hospital problems.  *         DIET:    Diet NPO Exceptions are: Sips of Water with Meds     MEDS (scheduled):    sodium chloride flush  5-40 mL IntraVENous 2 times per day    ceFAZolin (ANCEF) IVPB  2,000 mg IntraVENous On Call to OR    sodium chloride flush  5-40 mL IntraVENous 2 times per day       MEDS (infusions):   sodium chloride      sodium chloride      bumetanide 0.1 mg/mL infusion 1 mg/hr (08/22/22 0629)    dextrose      sodium chloride      [Held by provider] sodium chloride Stopped (08/21/22 0745)       MEDS (prn):  sodium chloride flush, sodium chloride, morphine, heparin flush, glucose, dextrose bolus **OR** dextrose bolus, glucagon (rDNA), dextrose, sodium chloride flush, sodium chloride, polyethylene glycol    PHYSICAL EXAM:     Patient Vitals for the past 24 hrs:   BP Temp Temp src Pulse Resp SpO2   08/22/22 0658 -- -- -- -- 18 --   08/22/22 0343 -- -- -- -- 18 --   08/22/22 0313 -- -- -- -- 18 --   08/21/22 2328 -- -- -- -- 18 --   08/21/22 2258 -- -- -- -- 18 --   08/21/22 2115 (!) 170/90 98 °F (36.7 °C) Oral 85 18 96 %   08/21/22 1945 -- -- -- -- 18 --   08/21/22 1530 (!) 160/100 98 °F (36.7 °C) Axillary 89 18 95 %   08/21/22 1045 (!) 149/100 98 °F (36.7 °C) Axillary 80 18 98 %     @      Intake/Output Summary (Last 24 hours) at consult for temp HD line           Electronically signed by Triny Landa MD on 8/22/2022

## 2022-08-23 LAB
ANION GAP SERPL CALCULATED.3IONS-SCNC: 14 MMOL/L (ref 7–16)
BUN BLDV-MCNC: 74 MG/DL (ref 6–20)
CALCIUM SERPL-MCNC: 7.8 MG/DL (ref 8.6–10.2)
CHLORIDE BLD-SCNC: 95 MMOL/L (ref 98–107)
CO2: 18 MMOL/L (ref 22–29)
CREAT SERPL-MCNC: 11.4 MG/DL (ref 0.7–1.2)
GFR AFRICAN AMERICAN: 6
GFR NON-AFRICAN AMERICAN: 6 ML/MIN/1.73
GLUCOSE BLD-MCNC: 103 MG/DL (ref 74–99)
HCT VFR BLD CALC: 30.1 % (ref 37–54)
HEMOGLOBIN: 10.6 G/DL (ref 12.5–16.5)
MCH RBC QN AUTO: 29.2 PG (ref 26–35)
MCHC RBC AUTO-ENTMCNC: 35.2 % (ref 32–34.5)
MCV RBC AUTO: 82.9 FL (ref 80–99.9)
PDW BLD-RTO: 14.1 FL (ref 11.5–15)
PLATELET # BLD: 177 E9/L (ref 130–450)
PMV BLD AUTO: 10 FL (ref 7–12)
POTASSIUM SERPL-SCNC: 5.2 MMOL/L (ref 3.5–5)
RBC # BLD: 3.63 E12/L (ref 3.8–5.8)
SODIUM BLD-SCNC: 127 MMOL/L (ref 132–146)
TOTAL CK: ABNORMAL U/L (ref 20–200)
WBC # BLD: 10.1 E9/L (ref 4.5–11.5)

## 2022-08-23 PROCEDURE — 6360000002 HC RX W HCPCS: Performed by: STUDENT IN AN ORGANIZED HEALTH CARE EDUCATION/TRAINING PROGRAM

## 2022-08-23 PROCEDURE — 2580000003 HC RX 258: Performed by: NURSE PRACTITIONER

## 2022-08-23 PROCEDURE — 82550 ASSAY OF CK (CPK): CPT

## 2022-08-23 PROCEDURE — 2580000003 HC RX 258: Performed by: ORTHOPAEDIC SURGERY

## 2022-08-23 PROCEDURE — 6370000000 HC RX 637 (ALT 250 FOR IP): Performed by: INTERNAL MEDICINE

## 2022-08-23 PROCEDURE — 90935 HEMODIALYSIS ONE EVALUATION: CPT

## 2022-08-23 PROCEDURE — 80048 BASIC METABOLIC PNL TOTAL CA: CPT

## 2022-08-23 PROCEDURE — 36415 COLL VENOUS BLD VENIPUNCTURE: CPT

## 2022-08-23 PROCEDURE — 2060000000 HC ICU INTERMEDIATE R&B

## 2022-08-23 PROCEDURE — 99226 PR SBSQ OBSERVATION CARE/DAY 35 MINUTES: CPT | Performed by: PHYSICIAN ASSISTANT

## 2022-08-23 PROCEDURE — 99233 SBSQ HOSP IP/OBS HIGH 50: CPT | Performed by: STUDENT IN AN ORGANIZED HEALTH CARE EDUCATION/TRAINING PROGRAM

## 2022-08-23 PROCEDURE — 85027 COMPLETE CBC AUTOMATED: CPT

## 2022-08-23 RX ORDER — HEPARIN SODIUM 1000 [USP'U]/ML
2200 INJECTION, SOLUTION INTRAVENOUS; SUBCUTANEOUS PRN
Status: DISCONTINUED | OUTPATIENT
Start: 2022-08-23 | End: 2022-09-02 | Stop reason: HOSPADM

## 2022-08-23 RX ORDER — HEPARIN SODIUM 1000 [USP'U]/ML
INJECTION, SOLUTION INTRAVENOUS; SUBCUTANEOUS
Status: DISPENSED
Start: 2022-08-23 | End: 2022-08-23

## 2022-08-23 RX ADMIN — MORPHINE SULFATE 4 MG: 4 INJECTION, SOLUTION INTRAMUSCULAR; INTRAVENOUS at 22:02

## 2022-08-23 RX ADMIN — MORPHINE SULFATE 4 MG: 4 INJECTION, SOLUTION INTRAMUSCULAR; INTRAVENOUS at 08:19

## 2022-08-23 RX ADMIN — MORPHINE SULFATE 4 MG: 4 INJECTION, SOLUTION INTRAMUSCULAR; INTRAVENOUS at 13:00

## 2022-08-23 RX ADMIN — MORPHINE SULFATE 4 MG: 4 INJECTION, SOLUTION INTRAMUSCULAR; INTRAVENOUS at 03:01

## 2022-08-23 RX ADMIN — MORPHINE SULFATE 4 MG: 4 INJECTION, SOLUTION INTRAMUSCULAR; INTRAVENOUS at 17:56

## 2022-08-23 RX ADMIN — BUMETANIDE 2 MG: 1 TABLET ORAL at 13:00

## 2022-08-23 RX ADMIN — Medication 10 ML: at 08:20

## 2022-08-23 RX ADMIN — Medication 10 ML: at 22:05

## 2022-08-23 ASSESSMENT — PAIN DESCRIPTION - LOCATION
LOCATION: SCROTUM;LEG
LOCATION: SCROTUM;LEG
LOCATION: LEG;SCROTUM

## 2022-08-23 ASSESSMENT — PAIN SCALES - GENERAL
PAINLEVEL_OUTOF10: 9
PAINLEVEL_OUTOF10: 5
PAINLEVEL_OUTOF10: 9

## 2022-08-23 ASSESSMENT — PAIN DESCRIPTION - DESCRIPTORS: DESCRIPTORS: ACHING

## 2022-08-23 ASSESSMENT — PAIN DESCRIPTION - PAIN TYPE: TYPE: ACUTE PAIN

## 2022-08-23 NOTE — PROGRESS NOTES
Department of Orthopedic Surgery      SUBJECTIVE  Pt seen and examined. Complains of worsening pain and localizes to his left gluteal region today. His thigh pain is about the same. States he cannot get comfortable in bed secondary to his pain in his buttocks. Patient was offered but refused fasciotomy of leg and lateral thigh yesterday. He does not desire surgical management at this time. OBJECTIVE    Physical    VITALS:  BP (!) 186/114   Pulse 92   Temp 98 °F (36.7 °C)   Resp 18   Ht 5' 10\" (1.778 m)   Wt 203 lb 11.3 oz (92.4 kg)   SpO2 99%   BMI 29.23 kg/m²     left LE:   Fullness to the buttock area, + TTP  Large scrotal edema present  Medial thigh soft and compressible  Anterior and lateral proximal thigh with improved fullness, remained with compressible compartments  Distal thigh soft and compressible similar to contralateral thigh  Increased fullness to lower leg but compartment compressible   Foot with diffuse swelling but soft and compresible   Deep peroneal and tibial nerve sensation intact. Foot pink and warm with palpable DP and PT pulses  Pain to the buttock region with hip and knee active and passive ROM. Data    CBC:   Lab Results   Component Value Date/Time    WBC 10.1 08/23/2022 07:55 AM    RBC 3.63 08/23/2022 07:55 AM    HGB 10.6 08/23/2022 07:55 AM    HCT 30.1 08/23/2022 07:55 AM    MCV 82.9 08/23/2022 07:55 AM    MCH 29.2 08/23/2022 07:55 AM    MCHC 35.2 08/23/2022 07:55 AM    RDW 14.1 08/23/2022 07:55 AM     08/23/2022 07:55 AM    MPV 10.0 08/23/2022 07:55 AM     PT/INR:    Lab Results   Component Value Date/Time    PROTIME 10.1 08/21/2022 05:39 AM    INR 0.9 08/21/2022 05:39 AM         ASSESSMENT   Left lower extremity swelling and pain    PLAN    Discussed findings with patient at this time. Patient does not wish to consider fasciotomy at this time. Continue with conservative management. Orthopaedic surgery to sign off at this time. Consult again if needed.  All questions answered.

## 2022-08-23 NOTE — CARE COORDINATION
S/p temporary HD cath 8/22- received 1st HD treatment 8/22- plan #2 treatment today. Referral made to [facility] for OPT chair time- info faxed to [phone]- will need to fax hep panel results and #2 & #3 HD treatments. Plan remains to return home w/ his mother on discharge. Does not have a PCP so would not be eligible for [facility] on discharge- PCP Pre-service # on discharge instructions. Declining Addiction recovery rehab or info. SW updated.  Will follow Efrain Spears RNcase manager     1400: Backdoor referral made to [facility]- awaiting response Efrain Spears RN case manager

## 2022-08-23 NOTE — PLAN OF CARE
Problem: Pain  Goal: Verbalizes/displays adequate comfort level or baseline comfort level  Outcome: Not Progressing     Problem: Discharge Planning  Goal: Discharge to home or other facility with appropriate resources  Outcome: Not Progressing  Flowsheets (Taken 8/23/2022 1150)  Discharge to home or other facility with appropriate resources: Identify barriers to discharge with patient and caregiver

## 2022-08-23 NOTE — PROGRESS NOTES
Hospitalist called and notified that pt's edema in lower extremities and scrotal area has increased since 1900 this evening. Pt is complaining of increased pain as well as wanting his catheter removed at this time.

## 2022-08-23 NOTE — PROGRESS NOTES
[facility] Progress Note    Admitting Date and Time: 8/17/2022  9:25 AM  Admit Dx: Hyperkalemia [E87.5]  Shock liver [K72.00]  Acute kidney injury ([address] [N17.9]  Fall, initial encounter [W19. XXXA]  Traumatic rhabdomyolysis, initial encounter ([facility] [address] Verlene Marissa. 6XXA]  Leukocytosis, unspecified type [D72.829]    Subjective:  Patient is being followed for Hyperkalemia [E87.5]  Shock liver [K72.00]  Acute kidney injury ([address] [N17.9]  Fall, initial encounter [W19. XXXA]  Traumatic rhabdomyolysis, initial encounter ([facility] [address] Verlene Marissa. 6XXA]  Leukocytosis, unspecified type [D72.829]   Pt reports excruciating pain in scrotal area, left thigh and hip region. Requested to have fair removed, denies suprapubic discomfort. Per RN: No major concerns, removed fair per patient.     ROS: denies fever, chills, cp, sob, n/v, HA unless stated above.      sodium chloride flush  5-40 mL IntraVENous 2 times per day    morphine  4 mg IntraVENous Once    bumetanide  2 mg Oral Daily    sodium chloride flush  5-40 mL IntraVENous 2 times per day     sodium chloride flush, 5-40 mL, PRN  sodium chloride, , PRN  morphine, 4 mg, Q4H PRN  heparin flush, 100 Units, PRN  glucose, 4 tablet, PRN  dextrose bolus, 125 mL, PRN   Or  dextrose bolus, 250 mL, PRN  glucagon (rDNA), 1 mg, PRN  dextrose, , Continuous PRN  sodium chloride flush, 5-40 mL, PRN  sodium chloride, , PRN  polyethylene glycol, 17 g, Daily PRN       Objective:    BP (!) 232/160   Pulse 83   Temp 98.2 °F (36.8 °C)   Resp 18   Ht 5' 10\" (1.778 m)   Wt 210 lb 5.1 oz (95.4 kg)   SpO2 99%   BMI 30.18 kg/m²     General Appearance: alert and oriented to person, place and time and in mod acute distress from pain  Skin: warm and dry, bruises left face, tattoos  Head: normocephalic and atraumatic  Eyes: pupils equal, round, and reactive to light, extraocular eye movements intact, conjunctivae normal  Neck: neck supple and non tender without mass, rt IJ cvc   Pulmonary/Chest: clear to auscultation bilaterally- no wheezes, rales or rhonchi, normal air movement, no respiratory distress  Cardiovascular: normal rate, normal S1 and S2 and no carotid bruits  Abdomen: soft, non-tender, non-distended, normal bowel sounds, no masses or organomegaly, scrotal swelling  Extremities: no cyanosis, no clubbing and + edema, swelling, restricted ROM, pain exacerbated by passive stretch left upper and lower extremity, firm to palpation, paresthesias, numbness+, distal pulses palpable,  Neurologic: no cranial nerve deficit and speech normal      Recent Labs     08/21/22  0539 08/22/22  1150 08/23/22  0755   * 125* 127*   K 4.4 5.2* 5.2*   CL 95* 91* 95*   CO2 16* 16* 18*   BUN 69* 85* 74*   CREATININE 11.1* 12.8* 11.4*   GLUCOSE 87 78 103*   CALCIUM 6.7* 7.7* 7.8*       Recent Labs     08/21/22  0539 08/22/22  1150 08/23/22  0755   WBC 8.3 8.8 10.1   RBC 3.84 4.47 3.63*   HGB 11.6* 13.3 10.6*   HCT 32.3* 37.8 30.1*   MCV 84.1 84.6 82.9   MCH 30.2 29.8 29.2   MCHC 35.9* 35.2* 35.2*   RDW 13.5 13.5 14.1    247 177   MPV 9.6 9.5 10.0       Micro:  No components found for: [facility])    Radiology:   XR PELVIS (1-2 VIEWS)    Result Date: 8/17/2022  EXAMINATION: ONE XRAY VIEW OF THE PELVIS 8/17/2022 10:21 am COMPARISON: None. HISTORY: ORDERING SYSTEM PROVIDED HISTORY: fall TECHNOLOGIST PROVIDED HISTORY: Reason for exam:->fall FINDINGS: No evidence of pelvic fracture. Bilateral hips demonstrate normal alignment. No focal osseous lesion. SI joints are symmetric. No acute abnormality of the pelvis. XR HUMERUS LEFT (MIN 2 VIEWS)    Result Date: 8/17/2022  EXAMINATION: TWO XRAY VIEWS OF THE LEFT HUMERUS 8/17/2022 7:58 pm COMPARISON: None. HISTORY: ORDERING SYSTEM PROVIDED HISTORY: Pain TECHNOLOGIST PROVIDED HISTORY: Reason for exam:->Pain FINDINGS: Alignment: No traumatic malalignment. Osseous: Intact without a definite acute fracture identified. Joints: No significant arthritic disease is present.  Other: There is prominent appearing soft tissue swelling and apparently linear air anteriorly, advise clinical correlation. .     No acute fracture is identified. Prominent soft tissue swelling an apparent air tracking in the antecubital region. RECOMMENDATION: Clinical correlation. XR ELBOW LEFT (MIN 3 VIEWS)    Result Date: 8/17/2022  EXAMINATION: THREE X-RAY VIEWS OF THE LEFT ELBOW 8/17/2022 7:58 pm COMPARISON: None HISTORY: ORDERING SYSTEM PROVIDED HISTORY: Pain TECHNOLOGIST PROVIDED HISTORY: Reason for Exam:  Pain FINDINGS: Alignment: No traumatic malalignment. Osseous: Intact without a definite acute fracture identified. Joints: No significant arthritic disease is present. Other: There is prominent appearing soft tissue swelling and apparently linear air anteriorly, advise clinical correlation. No acute fracture is identified. Prominent soft tissue swelling and apparent air tracking. RECOMMENDATION: Clinical correlation. XR RADIUS ULNA LEFT (2 VIEWS)    Result Date: 8/17/2022  EXAMINATION: TWO XRAY VIEWS OF THE LEFT FOREARM 8/17/2022 7:58 pm COMPARISON: None. HISTORY: ORDERING SYSTEM PROVIDED HISTORY: Pain TECHNOLOGIST PROVIDED HISTORY: Reason for exam:->Pain FINDINGS: Alignment: No traumatic malalignment. Osseous: Intact without a definite acute fracture identified. Joints: No significant arthritic disease is present. Other: There is prominent appearing soft tissue swelling and apparently linear air anteriorly, advise clinical correlation for relating to procedure versus complication. .. No acute fracture is identified. Prominent soft tissue swelling an apparent air tracking. Results were discussed with the ordering physician. RECOMMENDATION: Clinical correlation.      CT Head WO Contrast    Result Date: 8/17/2022  EXAMINATION: CT OF THE HEAD WITHOUT CONTRAST  8/17/2022 10:34 am TECHNIQUE: CT of the head was performed without the administration of intravenous contrast. Automated exposure control, iterative reconstruction, and/or weight based adjustment of the mA/kV was utilized to reduce the radiation dose to as low as reasonably achievable. COMPARISON: None. HISTORY: ORDERING SYSTEM PROVIDED HISTORY: fall TECHNOLOGIST PROVIDED HISTORY: Reason for exam:->fall Has a \"code stroke\" or \"stroke alert\" been called? ->No Decision Support Exception - unselect if not a suspected or confirmed emergency medical condition->Emergency Medical Condition (MA) FINDINGS: BRAIN/VENTRICLES: There is no acute intracranial hemorrhage, mass effect or midline shift. No abnormal extra-axial fluid collection. The gray-white differentiation is maintained without evidence of an acute infarct. There is no evidence of hydrocephalus. ORBITS: The visualized portion of the orbits demonstrate no acute abnormality. SINUSES: The visualized paranasal sinuses and mastoid air cells demonstrate no acute abnormality. SOFT TISSUES/SKULL:  No acute abnormality of the visualized skull or soft tissues. No acute intracranial abnormality. CT Cervical Spine WO Contrast    Result Date: 8/17/2022  EXAMINATION: CT OF THE CERVICAL SPINE WITHOUT CONTRAST 8/17/2022 10:34 am TECHNIQUE: CT of the cervical spine was performed without the administration of intravenous contrast. Multiplanar reformatted images are provided for review. Automated exposure control, iterative reconstruction, and/or weight based adjustment of the mA/kV was utilized to reduce the radiation dose to as low as reasonably achievable. COMPARISON: None. HISTORY: ORDERING SYSTEM PROVIDED HISTORY: fall TECHNOLOGIST PROVIDED HISTORY: Reason for exam:->fall Decision Support Exception - unselect if not a suspected or confirmed emergency medical condition->Emergency Medical Condition (MA) FINDINGS: BONES/ALIGNMENT: There is no acute fracture or traumatic malalignment. There is straightening of lordotic curvature likely due to muscular spasm and/or positioning of the neck.  DEGENERATIVE length. Bilateral echogenic renal cortices. No evidence of hydronephrosis or intrarenal stones. Bladder: Echogenic debris within the floor of the urinary bladder. Bilateral echogenic renal cortices. Echogenic debris within the floor of the urinary bladder. Follow-up recommended. Assessment:    Principal Problem:    Hyperkalemia  Active Problems:    Acute kidney injury ([facility] [address]    Fall    IV infiltrate, initial encounter  Resolved Problems:    * No resolved hospital problems. *      Plan:  Traumatic Rhabdomyolysis- questionable mech Fall - Hyperkalemia, acidosis, HELGA worsening- Vigorous hydration, IVFs with Bumex gtt, f/u CK, Nephrology on board, s/p  Ca+Gluc/HCO3/Regular Insulin/Lokelma, EKG with changes related to hyperkalemia. Transaminitis - ? Shock liver vs Drug overdose - utox positive for cocaine, fentanyl, tylenol level wnl, hepatitis panel, hiv, vdrl, INR, USG GB/Liver unremarkable, Ethanol <10, GI consulted. 3. Hyperglycemia - No known DM. Glucose 187 on admission. A1C - 5.9,  lipid panel wnl. Follow, currently on IVFs with dextrose. 4. Ruled out UTI - leukocytosis, UA abnormal, debris in bladder on US retroperitoneum,  urine cx negative. 5.  Contusions/ myalgias vs  Incipient compartment synd  ( ruled out per ortho)- - Left upper arm and left upper thigh, \"out of proportion pain \"swelling, decreased ROM, paresthesias, numbness, pain exacerbated by passive stretch left upper and lower extremity, very firm to palpation, increased girth relative to contralateral extremity, in the settings of rhabdomyolysis, hyperkalemia, acidosis, though pulses intact with intact motor strength is usually a late finding), consulted ortho for urgent consult, prn analgesics. Dopplers neg for clot, imaging neg for fractures. Will defer further surgical management including prophylactic fasciotomy to ortho surgeon per his expertise. Since pain improving will wean morphine 4 mg q4hrs to q 6hrs.    6. Scrotal swelling - Anasarca - stopped IVFs per Nephro, Got temp HD cath, started on HD, for vol management, vasc surgery consulted. Started on Bumex gtt, continuing HD, second opinion obtained for fasciotomy, not imminent danger currently, pt & family reluctant, want to observe for now, orthopedic surgery signed off.    8/22 - had an extensive discussion the mother, does not want pt to have fasciotomy at this time, want to observe on HD. Rt femoral CVC renoved, s/p temp RIJ HD line 8/22 for vascular access. DVT prophylaxis - SCDs      NOTE: This report was transcribed using voice recognition software. Every effort was made to ensure accuracy; however, inadvertent computerized transcription errors may be present.   Electronically signed by Sonia Carlisle MD on 8/23/2022 at 9:46 AM

## 2022-08-23 NOTE — FLOWSHEET NOTE
Pt completed 3.5hrs of HD with 3L of UF removed safely. 08/23/22 1140   Vital Signs   BP (!) 186/114   Temp 98 °F (36.7 °C)   Resp 18   Weight 203 lb 11.3 oz (92.4 kg)   Weight Method Bed scale   Percent Weight Change -3.14   Pain Assessment   Pain Assessment 0-10   Pain Type Acute pain   Pain Location Leg;Scrotum   Pain Descriptors Aching   Post-Hemodialysis Assessment   Post-Treatment Procedures Blood returned;Catheter capped, clamped and heparinized x 2 ports   Machine Disinfection Process Acid/Vinegar Clean;Exterior Machine Disinfection;Machine Absence of Bleach Machine;Bleach   Rinseback Volume (ml) 300 ml   Blood Volume Processed (Liters) 60.4 l/min   Dialyzer Clearance Moderately streaked   Duration of Treatment (minutes) 210 minutes   Hemodialysis Output (ml) 3300 ml   Tolerated Treatment Good   Patient Response to Treatment tolerated well; post report to Debby COWAN; Dr. Cuauhtemoc Rodriguez rounded during tx; stable at discharge.    Bilateral Breath Sounds Clear   Edema Left lower extremity   Time Off 1136   Patient Disposition Return to room

## 2022-08-23 NOTE — PROGRESS NOTES
Pt insisting that catheter be taken out at this time. Pt educated on the risks of taking the catheter out due to the increased edema. Pt still insisting that it be removed. Hill removed at this time and 150 cc emptied from bag.

## 2022-08-24 LAB
ANION GAP SERPL CALCULATED.3IONS-SCNC: 12 MMOL/L (ref 7–16)
BUN BLDV-MCNC: 59 MG/DL (ref 6–20)
CALCIUM SERPL-MCNC: 7.7 MG/DL (ref 8.6–10.2)
CHLORIDE BLD-SCNC: 94 MMOL/L (ref 98–107)
CO2: 23 MMOL/L (ref 22–29)
CREAT SERPL-MCNC: 9.5 MG/DL (ref 0.7–1.2)
GFR AFRICAN AMERICAN: 7
GFR NON-AFRICAN AMERICAN: 7 ML/MIN/1.73
GLUCOSE BLD-MCNC: 97 MG/DL (ref 74–99)
HAV IGM SER IA-ACNC: NORMAL
HBV SURFACE AB TITR SER: REACTIVE {TITER}
HCT VFR BLD CALC: 27.4 % (ref 37–54)
HEMOGLOBIN: 9.7 G/DL (ref 12.5–16.5)
HEPATITIS B CORE IGM ANTIBODY: NORMAL
HEPATITIS B SURFACE ANTIGEN INTERPRETATION: NORMAL
HEPATITIS C ANTIBODY INTERPRETATION: NORMAL
MCH RBC QN AUTO: 29.8 PG (ref 26–35)
MCHC RBC AUTO-ENTMCNC: 35.4 % (ref 32–34.5)
MCV RBC AUTO: 84 FL (ref 80–99.9)
PDW BLD-RTO: 14.4 FL (ref 11.5–15)
PLATELET # BLD: 143 E9/L (ref 130–450)
PMV BLD AUTO: 9.4 FL (ref 7–12)
POTASSIUM REFLEX MAGNESIUM: 4.4 MMOL/L (ref 3.5–5)
RBC # BLD: 3.26 E12/L (ref 3.8–5.8)
SODIUM BLD-SCNC: 129 MMOL/L (ref 132–146)
TOTAL CK: ABNORMAL U/L (ref 20–200)
WBC # BLD: 12.7 E9/L (ref 4.5–11.5)

## 2022-08-24 PROCEDURE — 6370000000 HC RX 637 (ALT 250 FOR IP): Performed by: INTERNAL MEDICINE

## 2022-08-24 PROCEDURE — 36415 COLL VENOUS BLD VENIPUNCTURE: CPT

## 2022-08-24 PROCEDURE — 2580000003 HC RX 258: Performed by: ORTHOPAEDIC SURGERY

## 2022-08-24 PROCEDURE — 85027 COMPLETE CBC AUTOMATED: CPT

## 2022-08-24 PROCEDURE — 82550 ASSAY OF CK (CPK): CPT

## 2022-08-24 PROCEDURE — 2060000000 HC ICU INTERMEDIATE R&B

## 2022-08-24 PROCEDURE — 90935 HEMODIALYSIS ONE EVALUATION: CPT

## 2022-08-24 PROCEDURE — 80048 BASIC METABOLIC PNL TOTAL CA: CPT

## 2022-08-24 PROCEDURE — 99232 SBSQ HOSP IP/OBS MODERATE 35: CPT | Performed by: INTERNAL MEDICINE

## 2022-08-24 PROCEDURE — 2580000003 HC RX 258: Performed by: NURSE PRACTITIONER

## 2022-08-24 PROCEDURE — APPSS30 APP SPLIT SHARED TIME 16-30 MINUTES: Performed by: NURSE PRACTITIONER

## 2022-08-24 PROCEDURE — 6360000002 HC RX W HCPCS: Performed by: STUDENT IN AN ORGANIZED HEALTH CARE EDUCATION/TRAINING PROGRAM

## 2022-08-24 RX ADMIN — MORPHINE SULFATE 4 MG: 4 INJECTION, SOLUTION INTRAMUSCULAR; INTRAVENOUS at 21:38

## 2022-08-24 RX ADMIN — MORPHINE SULFATE 4 MG: 4 INJECTION, SOLUTION INTRAMUSCULAR; INTRAVENOUS at 02:13

## 2022-08-24 RX ADMIN — BUMETANIDE 2 MG: 1 TABLET ORAL at 13:24

## 2022-08-24 RX ADMIN — MORPHINE SULFATE 4 MG: 4 INJECTION, SOLUTION INTRAMUSCULAR; INTRAVENOUS at 06:08

## 2022-08-24 RX ADMIN — MORPHINE SULFATE 4 MG: 4 INJECTION, SOLUTION INTRAMUSCULAR; INTRAVENOUS at 17:57

## 2022-08-24 RX ADMIN — Medication 10 ML: at 21:41

## 2022-08-24 RX ADMIN — MORPHINE SULFATE 4 MG: 4 INJECTION, SOLUTION INTRAMUSCULAR; INTRAVENOUS at 13:26

## 2022-08-24 RX ADMIN — Medication 10 ML: at 21:45

## 2022-08-24 RX ADMIN — Medication 10 ML: at 13:24

## 2022-08-24 ASSESSMENT — PAIN DESCRIPTION - LOCATION
LOCATION: BUTTOCKS;ANKLE
LOCATION: BUTTOCKS;ANKLE
LOCATION: ANKLE;BUTTOCKS

## 2022-08-24 ASSESSMENT — PAIN SCALES - GENERAL
PAINLEVEL_OUTOF10: 10
PAINLEVEL_OUTOF10: 10
PAINLEVEL_OUTOF10: 5
PAINLEVEL_OUTOF10: 10
PAINLEVEL_OUTOF10: 5
PAINLEVEL_OUTOF10: 10
PAINLEVEL_OUTOF10: 5

## 2022-08-24 ASSESSMENT — PAIN DESCRIPTION - DESCRIPTORS
DESCRIPTORS: ACHING
DESCRIPTORS: ACHING

## 2022-08-24 ASSESSMENT — PAIN DESCRIPTION - ORIENTATION
ORIENTATION: LEFT
ORIENTATION: LEFT

## 2022-08-24 NOTE — PROGRESS NOTES
[facility] Progress Note    Admitting Date and Time: 8/17/2022  9:25 AM  Admit Dx: Hyperkalemia [E87.5]  Shock liver [K72.00]  Acute kidney injury ([facility] [address] [N17.9]  Fall, initial encounter [W19. XXXA]  Traumatic rhabdomyolysis, initial encounter ([facility] [address] Gaurang Luna. 6XXA]  Leukocytosis, unspecified type [D72.829]      Assessment:    Principal Problem:    Hyperkalemia  Active Problems:    Acute kidney injury ([facility] [address]    Fall    IV infiltrate, initial encounter  Resolved Problems:    * No resolved hospital problems. *      Plan:  Traumatic or toxic rhabdomyolysis  - he was found down by mother, unknown down time but was last seen well 24 hours prior. Had complained of severe left leg and arm pain. CPK initially > 350,000, with transamanitis   Urine tox +ve cocaine and fentanyl  Tylenol level normal    Treated initially with aggressive IV fluids  CPK is trending down, now to ~21,000    After IV fluid resuscitation - developed anasarca and worsening renal failure. Now on HD. Awaiting determination re: [facility]. Associated severe left thigh pain with concern for compartment syndrome. Orthopedics x 2 consulted re: consideration for prophylactic fasciotomy  At this time, with CPK down trending and having started HD, hopeful that pain and muscle tightness with improve. Patient and his mother declined initial offer for fasciotomy    2. HELGA  - felt secondary to above rhabdomyolysis  Treated initially with IV fluids, then developed anasarca and concern for compartment syndrome. Treated with IV Bumex. Renal function further worsened. Cr peaked at 12.8. Had temporary HD cath inserted and started HD 8/22  Cr improved down to 9.5  Continue daily HD  Nephrology following    3. Transamanitis  - due to rhabdo  Tylenol was unremarkable  Abdominal imaging negative for acute process. Continue treatment of #1    4.  Hyperkalemia  - due to above  Initial K was 8.3  Was treated emergently with Ca+Gluc/HCO3/Regular Insulin/Lokelma  RESOLVED. 5. Hypervolemic hyponatremia  - manage with volume removal  Bumex and HD        Subjective:  Patient is being followed for Hyperkalemia [E87.5]  Shock liver [K72.00]  Acute kidney injury ([facility] [address] [N17.9]  Fall, initial encounter [W19. XXXA]  Traumatic rhabdomyolysis, initial encounter ([facility] [address] Fred Ann. 6XXA]  Leukocytosis, unspecified type [D72.829]     Seen following HD, sleepy but arouses easily. Still with moderate pain. Discussed LTAC referral and he states has not decided and would like to discuss with his mother. He is urinating, clear/abraham yellow urine    ROS: denies fever, chills, cp, sob, n/v, HA unless stated above.      sodium chloride flush  5-40 mL IntraVENous 2 times per day    morphine  4 mg IntraVENous Once    bumetanide  2 mg Oral Daily    sodium chloride flush  5-40 mL IntraVENous 2 times per day     heparin (porcine), 2,200 Units, PRN  sodium chloride flush, 5-40 mL, PRN  sodium chloride, , PRN  morphine, 4 mg, Q4H PRN  heparin flush, 100 Units, PRN  glucose, 4 tablet, PRN  dextrose bolus, 125 mL, PRN   Or  dextrose bolus, 250 mL, PRN  glucagon (rDNA), 1 mg, PRN  dextrose, , Continuous PRN  sodium chloride flush, 5-40 mL, PRN  sodium chloride, , PRN  polyethylene glycol, 17 g, Daily PRN         Objective:    BP (!) 173/99   Pulse 90   Temp 98.7 °F (37.1 °C) (Oral)   Resp 16   Ht 5' 10\" (1.778 m)   Wt 203 lb 11.3 oz (92.4 kg)   SpO2 100%   BMI 29.23 kg/m²     General Appearance: lying in bed, uncomfortable due to left thigh pain, scrotal swelling.    Skin: warm and dry  Head: normocephalic and atraumatic  Eyes: pupils equal, round, and reactive to light, extraocular eye movements intact, conjunctivae normal  Neck: neck supple and non tender without mass   Pulmonary/Chest: clear to auscultation bilaterally- no wheezes, rales or rhonchi, normal air movement, no respiratory distress  Cardiovascular: normal rate, normal S1 and S2 and no carotid bruits  Abdomen: soft, non-tender, non-distended, normal bowel sounds, no masses or organomegaly. Scrotum edematous  Extremities: left leg swelling, quads tight but soft and compressible  Neurologic: no cranial nerve deficit and speech normal        Recent Labs     08/22/22  1150 08/23/22  0755 08/24/22  0613   * 127* 129*   K 5.2* 5.2* 4.4   CL 91* 95* 94*   CO2 16* 18* 23   BUN 85* 74* 59*   CREATININE 12.8* 11.4* 9.5*   GLUCOSE 78 103* 97   CALCIUM 7.7* 7.8* 7.7*       Recent Labs     08/22/22  1150 08/23/22  0755 08/24/22  0613   WBC 8.8 10.1 12.7*   RBC 4.47 3.63* 3.26*   HGB 13.3 10.6* 9.7*   HCT 37.8 30.1* 27.4*   MCV 84.6 82.9 84.0   MCH 29.8 29.2 29.8   MCHC 35.2* 35.2* 35.4*   RDW 13.5 14.1 14.4    177 143   MPV 9.5 10.0 9.4       Radiology:     NOTE: This report was transcribed using voice recognition software. Every effort was made to ensure accuracy; however, inadvertent computerized transcription errors may be present.   Electronically signed by KARY Will CNP on 8/24/2022 at 3:20 PM

## 2022-08-24 NOTE — PROGRESS NOTES
BMI Categories: Overweight (BMI 25.0-29. 9)    Estimated Daily Nutrient Needs:  Energy Requirements Based On: Formula (Darzakiya Hadley)  Weight Used for Energy Requirements: Admission  Energy (kcal/day):   Weight Used for Protein Requirements: Ideal  Protein (g/day): 100-110 (1.2-1.4 g/kg as saundra )  Method Used for Fluid Requirements: Standard Renal  Fluid (ml/day):      Nutrition Diagnosis:   Increased nutrient needs related to renal dysfunction as evidenced by intake 26-50%, dialysis    Nutrition Interventions:   Food and/or Nutrient Delivery: Continue Current Diet, Start Oral Nutrition Supplement (Magic cup BID ONS)  Nutrition Education/Counseling: No recommendation at this time  Coordination of Nutrition Care: Continue to monitor while inpatient       Goals:     Goals: PO intake 75% or greater       Nutrition Monitoring and Evaluation:   Behavioral-Environmental Outcomes: None Identified  Food/Nutrient Intake Outcomes: Food and Nutrient Intake, Supplement Intake  Physical Signs/Symptoms Outcomes: Biochemical Data, Constipation, GI Status, Fluid Status or Edema, Nutrition Focused Physical Findings, Skin, Weight    Discharge Planning:     Too soon to determine     Dedrick George RD, [address] , [place]  Contact: [phone]

## 2022-08-24 NOTE — PROGRESS NOTES
08/24/22 1131 -- -- -- -- -- -- 5' 10\" (1.778 m) --   08/24/22 1130 (!) 192/119 -- -- 75 -- -- -- --   08/24/22 1100 (!) 185/114 -- -- 76 -- -- -- --   08/24/22 1030 (!) 175/108 -- -- 76 -- -- -- --   08/24/22 1000 (!) 206/117 -- -- 96 -- -- -- --   08/24/22 0930 (!) 193/114 -- -- 82 -- -- -- --   08/24/22 0900 (!) 195/111 -- -- 77 -- -- -- --   08/24/22 0830 (!) 196/112 -- -- 78 -- -- -- --   08/24/22 0815 (!) 173/110 97.9 °F (36.6 °C) -- 84 16 -- -- --   08/24/22 0808 (!) 173/110 97.9 °F (36.6 °C) -- 84 16 -- -- 203 lb 11.3 oz (92.4 kg)   08/23/22 2205 (!) 178/85 97.8 °F (36.6 °C) Oral 84 18 97 % -- --     @      Intake/Output Summary (Last 24 hours) at 8/24/2022 1603  Last data filed at 8/24/2022 1205  Gross per 24 hour   Intake 540 ml   Output 2900 ml   Net -2360 ml           Wt Readings from Last 3 Encounters:   08/24/22 203 lb 11.3 oz (92.4 kg)   04/30/18 145 lb (65.8 kg)   12/14/17 155 lb (70.3 kg)       Constitutional:  in no acute distress  HEENT: NC/AT, EOMI, sclera and conjunctiva are clear and anicteric, mucus membranes moist  Neck: Trachea midline, no JVD  Cardiovascular: S1, S2 regular rhythm, no murmur,or rub  Respiratory:  No crackles, no wheeze  Gastrointestinal:  Soft, nontender, nondistended, NABS  Ext: Left lower extremity edema, mild, though otherwise no edema, feet warm  Skin: dry, no rash  Neuro: awake, alert, interactive.   Moves all 4 extremities      DATA:    Recent Labs     08/22/22  1150 08/23/22  0755 08/24/22  0613   WBC 8.8 10.1 12.7*   HGB 13.3 10.6* 9.7*   HCT 37.8 30.1* 27.4*   MCV 84.6 82.9 84.0    177 143       Recent Labs     08/22/22  1150 08/23/22  0755 08/24/22  0613   * 127* 129*   K 5.2* 5.2* 4.4   CL 91* 95* 94*   CO2 16* 18* 23   BUN 85* 74* 59*   CREATININE 12.8* 11.4* 9.5*   *  --   --    *  --   --    BILIDIR <0.2  --   --    BILITOT 0.6  --   --    ALKPHOS 55  --   --          Lab Results   Component Value Date    LABPROT 2.0 (H) 08/17/2022 LABPROT 2.0 08/17/2022       ASSESSMENT     1. Acute kidney injury, severe, oliguric secondary to rhabdomyolysis, itself severe    2. Rhabdomyolysis, severe, total CK on presentation 398,000, improving to total CK 8/18 morning 155,080. Awaiting reporting of this morning's true total CK (currently just listed as greater than 22,000). Does not appear to have compartment syndrome in either left upper or lower extremity, though both are quite sore, and he complains of tingling. 3.  Hyperkalemia due to HELGA, and rhabdomyolysis. 4.  Hyperphosphatemia, due to rhabdomyolysis, HELGA    5.   Transaminitis, improving    Plan :      Azotemia progressing with pt becoming hyper volemic and continue to be oligouric   Temp RIJ HD line 8/22   3th HD treatment today 8//24   CK trending down     Plan daily HD for solute and volume management   Bumex 2 mg po daily   Daily bmp , ck   Fu signs of recovery   If no signs of recovery then will be on tunneled HD line later on this week /early next week           Electronically signed by Ely Cushing, MD on 8/24/2022

## 2022-08-24 NOTE — FLOWSHEET NOTE
08/24/22 1205   Vital Signs   BP (!) 179/98   Temp 98 °F (36.7 °C)   Heart Rate 79   Resp 16   Weight   (Scale broken)   Pain Assessment   Pain Assessment None - Denies Pain   Post-Hemodialysis Assessment   Post-Treatment Procedures Blood returned;Catheter capped, clamped with Citrate x 2 ports   Machine Disinfection Process Exterior Machine Disinfection   Dialyzer Clearance Clear   Duration of Treatment (minutes) 210 minutes   Hemodialysis Intake (ml) 300 ml   Hemodialysis Output (ml) 2700 ml   NET Removed (ml) 2400   Tolerated Treatment Good   Patient Response to Treatment well tolerated   Bilateral Breath Sounds Clear   Edema Right upper extremity; Left upper extremity;Right lower extremity; Left lower extremity   RUE Edema None   LUE Edema None;+2;Non-pitting   RLE Edema None   LLE Edema +2;Non-pitting   Time Off 1153   Patient Disposition Return to room

## 2022-08-25 PROBLEM — D72.829 LEUKOCYTOSIS: Status: ACTIVE | Noted: 2022-08-25

## 2022-08-25 PROBLEM — R79.89 ELEVATED LFTS: Status: ACTIVE | Noted: 2022-08-25

## 2022-08-25 PROBLEM — T79.6XXA TRAUMATIC RHABDOMYOLYSIS (HCC): Status: ACTIVE | Noted: 2022-08-25

## 2022-08-25 LAB
ALBUMIN SERPL-MCNC: 2.2 G/DL (ref 3.5–5.2)
ALP BLD-CCNC: 46 U/L (ref 40–129)
ALT SERPL-CCNC: 179 U/L (ref 0–40)
ANION GAP SERPL CALCULATED.3IONS-SCNC: 10 MMOL/L (ref 7–16)
AST SERPL-CCNC: 179 U/L (ref 0–39)
BASOPHILS ABSOLUTE: 0.06 E9/L (ref 0–0.2)
BASOPHILS RELATIVE PERCENT: 0.4 % (ref 0–2)
BILIRUB SERPL-MCNC: 0.8 MG/DL (ref 0–1.2)
BUN BLDV-MCNC: 42 MG/DL (ref 6–20)
CALCIUM SERPL-MCNC: 7.9 MG/DL (ref 8.6–10.2)
CHLORIDE BLD-SCNC: 96 MMOL/L (ref 98–107)
CO2: 26 MMOL/L (ref 22–29)
CREAT SERPL-MCNC: 7.6 MG/DL (ref 0.7–1.2)
EOSINOPHILS ABSOLUTE: 0.51 E9/L (ref 0.05–0.5)
EOSINOPHILS RELATIVE PERCENT: 3.5 % (ref 0–6)
GFR AFRICAN AMERICAN: 10
GFR NON-AFRICAN AMERICAN: 10 ML/MIN/1.73
GLUCOSE BLD-MCNC: 94 MG/DL (ref 74–99)
HCT VFR BLD CALC: 26.9 % (ref 37–54)
HEMOGLOBIN: 9.3 G/DL (ref 12.5–16.5)
IMMATURE GRANULOCYTES #: 0.1 E9/L
IMMATURE GRANULOCYTES %: 0.7 % (ref 0–5)
LYMPHOCYTES ABSOLUTE: 2.54 E9/L (ref 1.5–4)
LYMPHOCYTES RELATIVE PERCENT: 17.6 % (ref 20–42)
MCH RBC QN AUTO: 29.6 PG (ref 26–35)
MCHC RBC AUTO-ENTMCNC: 34.6 % (ref 32–34.5)
MCV RBC AUTO: 85.7 FL (ref 80–99.9)
MONOCYTES ABSOLUTE: 1.18 E9/L (ref 0.1–0.95)
MONOCYTES RELATIVE PERCENT: 8.2 % (ref 2–12)
NEUTROPHILS ABSOLUTE: 10.08 E9/L (ref 1.8–7.3)
NEUTROPHILS RELATIVE PERCENT: 69.6 % (ref 43–80)
PDW BLD-RTO: 14.8 FL (ref 11.5–15)
PLATELET # BLD: 167 E9/L (ref 130–450)
PMV BLD AUTO: 10.4 FL (ref 7–12)
POTASSIUM SERPL-SCNC: 4.3 MMOL/L (ref 3.5–5)
RBC # BLD: 3.14 E12/L (ref 3.8–5.8)
SODIUM BLD-SCNC: 132 MMOL/L (ref 132–146)
TOTAL CK: ABNORMAL U/L (ref 20–200)
TOTAL PROTEIN: 4.3 G/DL (ref 6.4–8.3)
WBC # BLD: 14.5 E9/L (ref 4.5–11.5)

## 2022-08-25 PROCEDURE — 82550 ASSAY OF CK (CPK): CPT

## 2022-08-25 PROCEDURE — 80053 COMPREHEN METABOLIC PANEL: CPT

## 2022-08-25 PROCEDURE — 85025 COMPLETE CBC W/AUTO DIFF WBC: CPT

## 2022-08-25 PROCEDURE — 36415 COLL VENOUS BLD VENIPUNCTURE: CPT

## 2022-08-25 PROCEDURE — 6370000000 HC RX 637 (ALT 250 FOR IP): Performed by: PHYSICIAN ASSISTANT

## 2022-08-25 PROCEDURE — 2580000003 HC RX 258: Performed by: ORTHOPAEDIC SURGERY

## 2022-08-25 PROCEDURE — 6360000002 HC RX W HCPCS: Performed by: STUDENT IN AN ORGANIZED HEALTH CARE EDUCATION/TRAINING PROGRAM

## 2022-08-25 PROCEDURE — 2060000000 HC ICU INTERMEDIATE R&B

## 2022-08-25 PROCEDURE — 2580000003 HC RX 258: Performed by: NURSE PRACTITIONER

## 2022-08-25 PROCEDURE — 2500000003 HC RX 250 WO HCPCS: Performed by: INTERNAL MEDICINE

## 2022-08-25 PROCEDURE — 6370000000 HC RX 637 (ALT 250 FOR IP): Performed by: INTERNAL MEDICINE

## 2022-08-25 PROCEDURE — 99232 SBSQ HOSP IP/OBS MODERATE 35: CPT | Performed by: INTERNAL MEDICINE

## 2022-08-25 RX ORDER — CARVEDILOL 6.25 MG/1
6.25 TABLET ORAL 2 TIMES DAILY
Status: DISCONTINUED | OUTPATIENT
Start: 2022-08-25 | End: 2022-08-28

## 2022-08-25 RX ORDER — LABETALOL HYDROCHLORIDE 5 MG/ML
10 INJECTION, SOLUTION INTRAVENOUS EVERY 4 HOURS PRN
Status: DISCONTINUED | OUTPATIENT
Start: 2022-08-25 | End: 2022-09-02 | Stop reason: HOSPADM

## 2022-08-25 RX ORDER — SENNA AND DOCUSATE SODIUM 50; 8.6 MG/1; MG/1
2 TABLET, FILM COATED ORAL 2 TIMES DAILY
Status: DISCONTINUED | OUTPATIENT
Start: 2022-08-25 | End: 2022-09-02 | Stop reason: HOSPADM

## 2022-08-25 RX ADMIN — Medication 10 ML: at 09:33

## 2022-08-25 RX ADMIN — MORPHINE SULFATE 4 MG: 4 INJECTION, SOLUTION INTRAMUSCULAR; INTRAVENOUS at 23:40

## 2022-08-25 RX ADMIN — Medication 10 ML: at 19:54

## 2022-08-25 RX ADMIN — MORPHINE SULFATE 4 MG: 4 INJECTION, SOLUTION INTRAMUSCULAR; INTRAVENOUS at 06:22

## 2022-08-25 RX ADMIN — CARVEDILOL 6.25 MG: 6.25 TABLET, FILM COATED ORAL at 19:49

## 2022-08-25 RX ADMIN — MORPHINE SULFATE 4 MG: 4 INJECTION, SOLUTION INTRAMUSCULAR; INTRAVENOUS at 19:43

## 2022-08-25 RX ADMIN — DOCUSATE SODIUM 50 MG AND SENNOSIDES 8.6 MG 2 TABLET: 8.6; 5 TABLET, FILM COATED ORAL at 11:19

## 2022-08-25 RX ADMIN — BUMETANIDE 2 MG: 1 TABLET ORAL at 09:33

## 2022-08-25 RX ADMIN — MORPHINE SULFATE 4 MG: 4 INJECTION, SOLUTION INTRAMUSCULAR; INTRAVENOUS at 01:29

## 2022-08-25 RX ADMIN — Medication 10 ML: at 19:53

## 2022-08-25 RX ADMIN — LABETALOL HYDROCHLORIDE 10 MG: 5 INJECTION INTRAVENOUS at 16:53

## 2022-08-25 RX ADMIN — MORPHINE SULFATE 4 MG: 4 INJECTION, SOLUTION INTRAMUSCULAR; INTRAVENOUS at 11:19

## 2022-08-25 RX ADMIN — DOCUSATE SODIUM 50 MG AND SENNOSIDES 8.6 MG 2 TABLET: 8.6; 5 TABLET, FILM COATED ORAL at 19:49

## 2022-08-25 RX ADMIN — MORPHINE SULFATE 4 MG: 4 INJECTION, SOLUTION INTRAMUSCULAR; INTRAVENOUS at 15:46

## 2022-08-25 ASSESSMENT — PAIN SCALES - GENERAL
PAINLEVEL_OUTOF10: 5
PAINLEVEL_OUTOF10: 10
PAINLEVEL_OUTOF10: 10
PAINLEVEL_OUTOF10: 0
PAINLEVEL_OUTOF10: 10

## 2022-08-25 ASSESSMENT — PAIN DESCRIPTION - LOCATION
LOCATION: GENERALIZED
LOCATION: BUTTOCKS;HIP;LEG
LOCATION: BACK;BUTTOCKS;LEG
LOCATION: GENERALIZED
LOCATION: BUTTOCKS;LEG;ANKLE
LOCATION: GENERALIZED
LOCATION: LEG;BUTTOCKS

## 2022-08-25 ASSESSMENT — PAIN DESCRIPTION - ORIENTATION
ORIENTATION: LEFT

## 2022-08-25 ASSESSMENT — PAIN DESCRIPTION - DESCRIPTORS
DESCRIPTORS: ACHING
DESCRIPTORS: ACHING
DESCRIPTORS: THROBBING;ACHING
DESCRIPTORS: ACHING
DESCRIPTORS: ACHING

## 2022-08-25 ASSESSMENT — PAIN DESCRIPTION - FREQUENCY: FREQUENCY: CONTINUOUS

## 2022-08-25 ASSESSMENT — PAIN - FUNCTIONAL ASSESSMENT: PAIN_FUNCTIONAL_ASSESSMENT: PREVENTS OR INTERFERES SOME ACTIVE ACTIVITIES AND ADLS

## 2022-08-25 NOTE — PROGRESS NOTES
[facility] MD Mayda Correia, MD Ham Hilario Ed, CNP   Linda Anguiano, ANGELO  Progress Note    8/25/2022    SUBJECTIVE:   8/18: Complaining of pain in his left upper and lower extremities. Tingling in both extremities. Swelling, though in the left upper extremity due to infiltrated IV. (-) sob on room air. No cp/palp Appetite poor    8/20 seen in her room on RA , alert oriented cooperative stable bp .     8/21 low UO swelling noted     8/22 getting temp IJ HD line then plan daily HD for solute and volume management     8.23 seen on HD tolerating treatment still with scrotal edema and Upper LUE edema . No sensation issues . Ortho note reviewed     8/24 HD today with no reported issues   Still no signs of recovery   Good PO intake    8/25 comfortable on RA , he is telling me he has some UO though not impressive . PROBLEM LIST:    Principal Problem:    Hyperkalemia  Active Problems:    Acute kidney injury ([facility] [address]    Fall    IV infiltrate, initial encounter  Resolved Problems:    * No resolved hospital problems. *         DIET:    ADULT DIET;  Regular  ADULT ORAL NUTRITION SUPPLEMENT; Lunch, Dinner; Frozen Oral Supplement     MEDS (scheduled):    sodium chloride flush  5-40 mL IntraVENous 2 times per day    morphine  4 mg IntraVENous Once    bumetanide  2 mg Oral Daily    sodium chloride flush  5-40 mL IntraVENous 2 times per day       MEDS (infusions):   sodium chloride      dextrose      sodium chloride      [Held by provider] sodium chloride Stopped (08/21/22 0745)       MEDS (prn):  heparin (porcine), sodium chloride flush, sodium chloride, morphine, heparin flush, glucose, dextrose bolus **OR** dextrose bolus, glucagon (rDNA), dextrose, sodium chloride flush, sodium chloride, polyethylene glycol    PHYSICAL EXAM:     Patient Vitals for the past 24 hrs:   BP Temp Temp src Pulse Resp SpO2 Height   08/25/22 0930 (!) 172/90 98.1 °F (36.7 °C) Oral 93 18 99 % --   08/25/22 0622 -- -- -- -- 18 -- --   08/25/22 0115 (!) 177/92 98.4 °F (36.9 °C) Oral 91 18 98 % --   08/24/22 2208 -- -- -- -- 18 -- --   08/24/22 2100 (!) 180/94 98.5 °F (36.9 °C) Oral 88 18 99 % --   08/24/22 1315 (!) 173/99 98.7 °F (37.1 °C) Oral 90 16 100 % --   08/24/22 1205 (!) 179/98 98 °F (36.7 °C) -- 79 16 -- --   08/24/22 1131 -- -- -- -- -- -- 5' 10\" (1.778 m)   08/24/22 1130 (!) 192/119 -- -- 75 -- -- --   08/24/22 1100 (!) 185/114 -- -- 76 -- -- --     @      Intake/Output Summary (Last 24 hours) at 8/25/2022 1032  Last data filed at 8/24/2022 2138  Gross per 24 hour   Intake 310 ml   Output 2700 ml   Net -2390 ml           Wt Readings from Last 3 Encounters:   08/24/22 203 lb 11.3 oz (92.4 kg)   04/30/18 145 lb (65.8 kg)   12/14/17 155 lb (70.3 kg)       Constitutional:  in no acute distress  HEENT: NC/AT, EOMI, sclera and conjunctiva are clear and anicteric, mucus membranes moist  Neck: Trachea midline, no JVD  Cardiovascular: S1, S2 regular rhythm, no murmur,or rub  Respiratory:  No crackles, no wheeze  Gastrointestinal:  Soft, nontender, nondistended, NABS  Ext: Left lower extremity edema, mild, though otherwise no edema, feet warm  Skin: dry, no rash  Neuro: awake, alert, interactive. Moves all 4 extremities      DATA:    Recent Labs     08/23/22  0755 08/24/22  0613 08/25/22  0614   WBC 10.1 12.7* 14.5*   HGB 10.6* 9.7* 9.3*   HCT 30.1* 27.4* 26.9*   MCV 82.9 84.0 85.7    143 167       Recent Labs     08/22/22  1150 08/23/22  0755 08/24/22  0613 08/25/22  0614   * 127* 129* 132   K 5.2* 5.2* 4.4 4.3   CL 91* 95* 94* 96*   CO2 16* 18* 23 26   BUN 85* 74* 59* 42*   CREATININE 12.8* 11.4* 9.5* 7.6*   *  --   --  179*   *  --   --  179*   BILIDIR <0.2  --   --   --    BILITOT 0.6  --   --  0.8   ALKPHOS 55  --   --  46         Lab Results   Component Value Date    LABPROT 2.0 (H) 08/17/2022    LABPROT 2.0 08/17/2022       ASSESSMENT     1.   Acute kidney injury, severe, oliguric secondary to rhabdomyolysis, itself severe    2. Rhabdomyolysis, severe, total CK on presentation 398,000, improving to total CK 8/18 morning 155,080. Awaiting reporting of this morning's true total CK (currently just listed as greater than 22,000). Does not appear to have compartment syndrome in either left upper or lower extremity, though both are quite sore, and he complains of tingling. 3.  Hyperkalemia due to HELGA, and rhabdomyolysis. 4.  Hyperphosphatemia, due to rhabdomyolysis, HELGA    5.   Transaminitis, improving    Plan :      Azotemia progressing with pt becoming hyper volemic and continue to be oligouric   Temp RIJ HD line 8/22   3th HD treatment on 8//24   CK trending down     Hold HD today and see how he ll do   Bumex 2 mg po daily   Daily bmp , ck   Fu signs of recovery   If no signs of recovery then will be on tunneled HD line later on this week /early next week           Electronically signed by Thalia Leigh MD on 8/25/2022

## 2022-08-25 NOTE — CARE COORDINATION
Met w/ patient again regarding possible [facility] on discharge- states his mother will be visiting today to discuss- CM to follow up when she arrives- mother is unavailable to speak over phone . HD via via temp HD cath inserted 8/22--Insurance approved dialysis per [facility]HA- awaiting renal decision for need of tunneled cath and chair time. From home w/ his mother PTA. Does not have a PCP so [place] [id number] [place]- PCP Pre-service # on discharge instructions. PT/OT evals pending. SW updated.  Will follow  Estuardo Juan RNcase manager     1300: VM left w/ pt's mother [phone] to discuss discharge planning, possible [facility]-awaiting return call Estuardo Juan RN case manager

## 2022-08-25 NOTE — PROGRESS NOTES
[facility] Progress Note    Admitting Date and Time: 8/17/2022  9:25 AM  Admit Dx: Hyperkalemia [E87.5]  Shock liver [K72.00]  Acute kidney injury ([facility] [address] [N17.9]  Fall, initial encounter [W19. XXXA]  [facility] Berenice Schultz. 6XXA]  Leukocytosis, unspecified type [D72.829]    Subjective:  Patient is being followed for Hyperkalemia [E87.5]  Shock liver [K72.00]  Acute kidney injury ([facility] [address] [N17.9]  Fall, initial encounter [W19. XXXA]  Tra[facility]olysis[facility] Berenice Schultz. 6XXA]  Leukocytosis, unspecified type [D72.829]     Patient lying in bed in no acute distress. Continues to report pain in left lower extremity and hip. Also reporting scrotal swelling.      ROS: denies fever, chills, cp, sob, n/v, HA unless stated above.      sennosides-docusate sodium  2 tablet Oral BID    sodium chloride flush  5-40 mL IntraVENous 2 times per day    morphine  4 mg IntraVENous Once    bumetanide  2 mg Oral Daily    sodium chloride flush  5-40 mL IntraVENous 2 times per day     heparin (porcine), 2,200 Units, PRN  sodium chloride flush, 5-40 mL, PRN  sodium chloride, , PRN  morphine, 4 mg, Q4H PRN  heparin flush, 100 Units, PRN  glucose, 4 tablet, PRN  dextrose bolus, 125 mL, PRN   Or  dextrose bolus, 250 mL, PRN  glucagon (rDNA), 1 mg, PRN  dextrose, , Continuous PRN  sodium chloride flush, 5-40 mL, PRN  sodium chloride, , PRN  polyethylene glycol, 17 g, Daily PRN         Objective:    BP (!) 172/90   Pulse 93   Temp 98.1 °F (36.7 °C) (Oral)   Resp 18   Ht 5' 10\" (1.778 m)   Wt 203 lb 11.3 oz (92.4 kg)   SpO2 99%   BMI 29.23 kg/m²   General Appearance: alert and oriented to person, place and time and in no acute distress  Skin: warm and dry  Head: normocephalic and atraumatic  Eyes: pupils equal, round, and reactive to light, extraocular eye movements intact, conjunctivae normal  Neck: neck supple and non tender without mass   Pulmonary/Chest: clear to auscultation bilaterally- no wheezes, rales or rhonchi, normal air movement, no respiratory distress  Cardiovascular: normal rate, normal S1 and S2 and no carotid bruits  Abdomen: soft, non-tender, non-distended, normal bowel sounds, no masses or organomegaly  Extremities: no cyanosis, no clubbing and edema of bilateral lower extremities, edema of left upper extremity and hand, scrotal swelling   Neurologic: no cranial nerve deficit and speech normal        Recent Labs     08/23/22 0755 08/24/22  0613 08/25/22  0614   * 129* 132   K 5.2* 4.4 4.3   CL 95* 94* 96*   CO2 18* 23 26   BUN 74* 59* 42*   CREATININE 11.4* 9.5* 7.6*   GLUCOSE 103* 97 94   CALCIUM 7.8* 7.7* 7.9*       Recent Labs     08/23/22 0755 08/24/22 0613 08/25/22  0614   WBC 10.1 12.7* 14.5*   RBC 3.63* 3.26* 3.14*   HGB 10.6* 9.7* 9.3*   HCT 30.1* 27.4* 26.9*   MCV 82.9 84.0 85.7   MCH 29.2 29.8 29.6   MCHC 35.2* 35.4* 34.6*   RDW 14.1 14.4 14.8    143 167   MPV 10.0 9.4 10.4       Radiology: None    Assessment:    Principal Problem:    Hyperkalemia  Active Problems:    Acute kidney injury ([facility] [address]    Fall    IV infiltrate, initial encounter  Resolved Problems:    * No resolved hospital problems. *      Plan:  1. Rhabdomyolysis: S/p fall at home. Unknown down time, but was seen well 24 hours prior to admission. Presented with severe left leg and arm pain. CK initially >350,000 now down to 11,468. Developed anasarca and worsening renal failure after IVF resuscitation. 2. HELGA: Felt secondary to rhabdomyolysis. Treated initially with IVF, but patient developed anasarca and concern for compartment syndrome. Received IV Bumex. Renal function worsened, with peak at 12.8. Today Cr 7.9. Temporary HD cath inserted and HD started on 8/22. Continue daily HD. Avoid nephrotoxic agents. Nephrology following. May need tunneled cath if no improvement. 3. Transaminitis: Due to rhabdo. Tylenol was unremarkable. Abdominal imagining negative.  UDS positive for cocaine and fentanyl. Avoid heptotoxic agents. GI consulted. No immediate plans for intervention. Should follow up in the office 2-3 weeks after discharge. 4. Hyperkalemia: Resolved. K on admission 5.6. Ca+ gluconate, HCO3, Regular Insulin and lokelma given in ED. EKG in ED showing no evidence of acute ischemic injury, but peaked T waves in multiple leads. Today 4.3. Monitor. Nephrology following. 5. Drug Use: Admitted to taking \"pills\" on admission. UDS positive for cocaine and fentanyl. Encourage cessation. 6. Elevated glucose: No known DM. On admission glucose was 487. Now in the 100s. Hemoglobin a1c 5.9. Monitor. 7. Left thigh pain: Concern for compartment syndrome. Orthopedics x2 consulted. Prophylactic fasciotomy considered but patient and family declining at this time. 8. Hypervolemic hyponatremia: Resolved. Managed with volume removal. On Bumex and HD. NOTE: This report was transcribed using voice recognition software. Every effort was made to ensure accuracy; however, inadvertent computerized transcription errors may be present. Electronically signed by Diane Hernández PA-C on 8/25/2022 at 1:28 PM    33 minutes time spent reviewing patient chart, assessing patient, discussing plan of care with patient and family, discussing plan of care with collaborating physician, and charting. HOSPITALIST ATTENDING PHYSICIAN NOTE 8/25/2022 2026PM:    Details of the evaluation - subjective assessment (including medication profile, past medical, family and social history when applicable), examination, review of lab and test data, diagnostic impressions and medical decision making - performed by Diane Hernández PA-C, were discussed with me on the date of service and I agree with clinical information herein unless otherwise noted. The patient has been evaluated by me personally earlier today.   Pt reports no fevers, chills,n/v.     Exam: heart reg at rate of 96,lungs cta, abd pos bs soft nt, ext pos for 1-2+ b/l le edema    I agree with the assessment and plan of Daphnie Echevarria PA-C    Rhabdomyolysis  Manjinder  Hyperkalemia  Elevated lfts  Hyperglycemia  hyponatremia  leukocytosis    Electronically signed by Raheem Hamilton D.O.   Hospitalist  [facility] at [facility]

## 2022-08-26 LAB
ANION GAP SERPL CALCULATED.3IONS-SCNC: 11 MMOL/L (ref 7–16)
BASOPHILS ABSOLUTE: 0.07 E9/L (ref 0–0.2)
BASOPHILS RELATIVE PERCENT: 0.4 % (ref 0–2)
BUN BLDV-MCNC: 53 MG/DL (ref 6–20)
CALCIUM SERPL-MCNC: 7.9 MG/DL (ref 8.6–10.2)
CHLORIDE BLD-SCNC: 96 MMOL/L (ref 98–107)
CO2: 24 MMOL/L (ref 22–29)
CREAT SERPL-MCNC: 9.1 MG/DL (ref 0.7–1.2)
EOSINOPHILS ABSOLUTE: 0.58 E9/L (ref 0.05–0.5)
EOSINOPHILS RELATIVE PERCENT: 3.5 % (ref 0–6)
GFR AFRICAN AMERICAN: 8
GFR NON-AFRICAN AMERICAN: 8 ML/MIN/1.73
GLUCOSE BLD-MCNC: 103 MG/DL (ref 74–99)
HCT VFR BLD CALC: 26.3 % (ref 37–54)
HEMOGLOBIN: 9.1 G/DL (ref 12.5–16.5)
IMMATURE GRANULOCYTES #: 0.11 E9/L
IMMATURE GRANULOCYTES %: 0.7 % (ref 0–5)
LYMPHOCYTES ABSOLUTE: 2.36 E9/L (ref 1.5–4)
LYMPHOCYTES RELATIVE PERCENT: 14.3 % (ref 20–42)
MCH RBC QN AUTO: 30 PG (ref 26–35)
MCHC RBC AUTO-ENTMCNC: 34.6 % (ref 32–34.5)
MCV RBC AUTO: 86.8 FL (ref 80–99.9)
MONOCYTES ABSOLUTE: 1.11 E9/L (ref 0.1–0.95)
MONOCYTES RELATIVE PERCENT: 6.7 % (ref 2–12)
NEUTROPHILS ABSOLUTE: 12.32 E9/L (ref 1.8–7.3)
NEUTROPHILS RELATIVE PERCENT: 74.4 % (ref 43–80)
PDW BLD-RTO: 14.7 FL (ref 11.5–15)
PLATELET # BLD: 180 E9/L (ref 130–450)
PMV BLD AUTO: 10.2 FL (ref 7–12)
POTASSIUM SERPL-SCNC: 4.8 MMOL/L (ref 3.5–5)
RBC # BLD: 3.03 E12/L (ref 3.8–5.8)
SODIUM BLD-SCNC: 131 MMOL/L (ref 132–146)
TOTAL CK: 6953 U/L (ref 20–200)
WBC # BLD: 16.6 E9/L (ref 4.5–11.5)

## 2022-08-26 PROCEDURE — 80048 BASIC METABOLIC PNL TOTAL CA: CPT

## 2022-08-26 PROCEDURE — 85025 COMPLETE CBC W/AUTO DIFF WBC: CPT

## 2022-08-26 PROCEDURE — 6370000000 HC RX 637 (ALT 250 FOR IP): Performed by: PHYSICIAN ASSISTANT

## 2022-08-26 PROCEDURE — 2580000003 HC RX 258: Performed by: NURSE PRACTITIONER

## 2022-08-26 PROCEDURE — 6370000000 HC RX 637 (ALT 250 FOR IP): Performed by: INTERNAL MEDICINE

## 2022-08-26 PROCEDURE — 82550 ASSAY OF CK (CPK): CPT

## 2022-08-26 PROCEDURE — 36415 COLL VENOUS BLD VENIPUNCTURE: CPT

## 2022-08-26 PROCEDURE — 6360000002 HC RX W HCPCS: Performed by: STUDENT IN AN ORGANIZED HEALTH CARE EDUCATION/TRAINING PROGRAM

## 2022-08-26 PROCEDURE — 99232 SBSQ HOSP IP/OBS MODERATE 35: CPT | Performed by: INTERNAL MEDICINE

## 2022-08-26 PROCEDURE — 90935 HEMODIALYSIS ONE EVALUATION: CPT

## 2022-08-26 PROCEDURE — 2500000003 HC RX 250 WO HCPCS: Performed by: INTERNAL MEDICINE

## 2022-08-26 PROCEDURE — 2060000000 HC ICU INTERMEDIATE R&B

## 2022-08-26 RX ADMIN — BUMETANIDE 2 MG: 1 TABLET ORAL at 10:23

## 2022-08-26 RX ADMIN — MORPHINE SULFATE 4 MG: 4 INJECTION, SOLUTION INTRAMUSCULAR; INTRAVENOUS at 12:21

## 2022-08-26 RX ADMIN — CARVEDILOL 6.25 MG: 6.25 TABLET, FILM COATED ORAL at 20:40

## 2022-08-26 RX ADMIN — Medication 10 ML: at 10:23

## 2022-08-26 RX ADMIN — MORPHINE SULFATE 4 MG: 4 INJECTION, SOLUTION INTRAMUSCULAR; INTRAVENOUS at 03:55

## 2022-08-26 RX ADMIN — LABETALOL HYDROCHLORIDE 10 MG: 5 INJECTION INTRAVENOUS at 17:41

## 2022-08-26 RX ADMIN — LABETALOL HYDROCHLORIDE 10 MG: 5 INJECTION INTRAVENOUS at 04:27

## 2022-08-26 RX ADMIN — Medication 10 ML: at 20:46

## 2022-08-26 RX ADMIN — CARVEDILOL 6.25 MG: 6.25 TABLET, FILM COATED ORAL at 10:23

## 2022-08-26 RX ADMIN — MORPHINE SULFATE 4 MG: 4 INJECTION, SOLUTION INTRAMUSCULAR; INTRAVENOUS at 08:16

## 2022-08-26 RX ADMIN — MORPHINE SULFATE 4 MG: 4 INJECTION, SOLUTION INTRAMUSCULAR; INTRAVENOUS at 16:27

## 2022-08-26 RX ADMIN — DOCUSATE SODIUM 50 MG AND SENNOSIDES 8.6 MG 2 TABLET: 8.6; 5 TABLET, FILM COATED ORAL at 20:40

## 2022-08-26 RX ADMIN — MORPHINE SULFATE 4 MG: 4 INJECTION, SOLUTION INTRAMUSCULAR; INTRAVENOUS at 20:43

## 2022-08-26 ASSESSMENT — PAIN SCALES - GENERAL
PAINLEVEL_OUTOF10: 10
PAINLEVEL_OUTOF10: 3

## 2022-08-26 ASSESSMENT — PAIN DESCRIPTION - ORIENTATION
ORIENTATION: LEFT

## 2022-08-26 ASSESSMENT — PAIN DESCRIPTION - LOCATION
LOCATION: LEG;BUTTOCKS
LOCATION: LEG
LOCATION: LEG
LOCATION: HIP

## 2022-08-26 ASSESSMENT — PAIN DESCRIPTION - DESCRIPTORS
DESCRIPTORS: ACHING
DESCRIPTORS: ACHING;DISCOMFORT
DESCRIPTORS: TIGHTNESS;SQUEEZING

## 2022-08-26 ASSESSMENT — PAIN DESCRIPTION - PAIN TYPE: TYPE: ACUTE PAIN

## 2022-08-26 NOTE — PROGRESS NOTES
notified this AM, pt refusing labs, called lab and changed order for the BMP to an add on so that it can be added on to this AM's CK level.

## 2022-08-26 NOTE — FLOWSHEET NOTE
Pt completed 3.5hrs of HD on a 3K bath with 2L of UF removed safely. 08/26/22 1830   Vital Signs   BP (!) 181/97   Temp 98 °F (36.7 °C)   Heart Rate 79   Resp 18   Weight 199 lb 4.7 oz (90.4 kg)   Percent Weight Change -2.16   Pain Assessment   Pain Assessment 0-10   Pain Level 3   Post-Hemodialysis Assessment   Post-Treatment Procedures Blood returned;Catheter capped, clamped and heparinized x 2 ports   Machine Disinfection Process Acid/Vinegar Clean;Heat Disinfect; Exterior Machine Disinfection   Rinseback Volume (ml) 300 ml   Blood Volume Processed (Liters) 59.6 l/min   Dialyzer Clearance Lightly streaked   Duration of Treatment (minutes) 210 minutes   Hemodialysis Output (ml) 2000 ml   Tolerated Treatment Good   Patient Response to Treatment tolerated well; stable at discharge   Perineal Edema +3   Sacral Edema +3   Time Off 1826   Patient Disposition Return to room

## 2022-08-26 NOTE — PROGRESS NOTES
[facility] Progress Note    Admitting Date and Time: 8/17/2022  9:25 AM  Admit Dx: Hyperkalemia [E87.5]  Shock liver [K72.00]  Acute kidney injury ([address] [N17.9]  Fall, initial encounter [W19. XXXA]  Traumatic rhabdomyolysis, initial encounter ([address] Murleen Nabil. 6XXA]  Leukocytosis, unspecified type [D72.829]    Subjective:  Patient is being followed for Hyperkalemia [E87.5]  Shock liver [K72.00]  Acute kidney injury ([address] [N17.9]  Fall, initial encounter [W19. XXXA]  Traumatic rhabdomyolysis, initial encounter ([facility] [address] Murleen Nabil. 6XXA]  Leukocytosis, unspecified type [D72.829]     Patient lying in bed in no acute distress. Denies any new concerns. Discussed the importance of daily lab work with him. He verbalized understanding.      ROS: denies fever, chills, cp, sob, n/v, HA unless stated above.      sennosides-docusate sodium  2 tablet Oral BID    carvedilol  6.25 mg Oral BID    sodium chloride flush  5-40 mL IntraVENous 2 times per day    morphine  4 mg IntraVENous Once    bumetanide  2 mg Oral Daily    sodium chloride flush  5-40 mL IntraVENous 2 times per day     labetalol, 10 mg, Q4H PRN  heparin (porcine), 2,200 Units, PRN  sodium chloride flush, 5-40 mL, PRN  sodium chloride, , PRN  morphine, 4 mg, Q4H PRN  heparin flush, 100 Units, PRN  glucose, 4 tablet, PRN  dextrose bolus, 125 mL, PRN   Or  dextrose bolus, 250 mL, PRN  glucagon (rDNA), 1 mg, PRN  dextrose, , Continuous PRN  sodium chloride flush, 5-40 mL, PRN  sodium chloride, , PRN  polyethylene glycol, 17 g, Daily PRN       Objective:    BP (!) 188/104   Pulse 94   Temp 98.8 °F (37.1 °C) (Oral)   Resp 18   Ht 5' 10\" (1.778 m)   Wt 203 lb 11.3 oz (92.4 kg)   SpO2 99%   BMI 29.23 kg/m²   General Appearance: alert and oriented to person, place and time and in no acute distress  Skin: warm and dry  Head: normocephalic and atraumatic  Eyes: pupils equal, round, and reactive to light, extraocular eye movements intact, conjunctivae normal  Neck: neck supple and non tender without mass   Pulmonary/Chest: clear to auscultation bilaterally- no wheezes, rales or rhonchi, normal air movement, no respiratory distress  Cardiovascular: normal rate, normal S1 and S2 and no carotid bruits  Abdomen: soft, non-tender, non-distended, normal bowel sounds, no masses or organomegaly  Extremities: no cyanosis, no clubbing and edema of bilateral lower extremities, edema of left upper extremity and hand, scrotal swelling   Neurologic: no cranial nerve deficit and speech normal        Recent Labs     08/24/22 0613 08/25/22  0614   * 132   K 4.4 4.3   CL 94* 96*   CO2 23 26   BUN 59* 42*   CREATININE 9.5* 7.6*   GLUCOSE 97 94   CALCIUM 7.7* 7.9*         Recent Labs     08/24/22 0613 08/25/22  0614   WBC 12.7* 14.5*   RBC 3.26* 3.14*   HGB 9.7* 9.3*   HCT 27.4* 26.9*   MCV 84.0 85.7   MCH 29.8 29.6   MCHC 35.4* 34.6*   RDW 14.4 14.8    167   MPV 9.4 10.4         Radiology: None    Assessment:    Principal Problem:    Hyperkalemia  Active Problems:    Acute kidney injury (HCC)    Fall    IV infiltrate, initial encounter    Leukocytosis    Traumatic rhabdomyolysis (HCC)    Elevated LFTs  Resolved Problems:    * No resolved hospital problems. *      Plan:  1. Rhabdomyolysis: S/p fall at home. Unknown down time, but was seen well 24 hours prior to admission. Presented with severe left leg and arm pain. CK initially >350,000 now down to 64,613>5,359. Developed anasarca and worsening renal failure after IVF resuscitation. 2. HELGA: Felt secondary to rhabdomyolysis. Treated initially with IVF, but patient developed anasarca and concern for compartment syndrome. Received IV Bumex. Renal function worsened, with peak at 12.8. Temporary HD cath inserted and HD started on 8/22. Received daily HD, held on 8/25. Cr worsened today. Today Cr 9.1. Avoid nephrotoxic agents. Nephrology following. May need tunneled cath if no improvement. 3. Transaminitis: Due to rhabdo.  Tylenol was unremarkable. Abdominal imagining negative. UDS positive for cocaine and fentanyl. Avoid heptotoxic agents. GI consulted. No immediate plans for intervention. Should follow up in the office 2-3 weeks after discharge. 4. Hyperkalemia: Resolved. K on admission 5.6. Ca+ gluconate, HCO3, Regular Insulin and lokelma given in ED. EKG in ED showing no evidence of acute ischemic injury, but peaked T waves in multiple leads. Today 4.3. Monitor. Nephrology following. 5. Drug Use: Admitted to taking \"pills\" on admission. UDS positive for cocaine and fentanyl. Encourage cessation. 6. Elevated glucose: No known DM. On admission glucose was 487. Now in the 100s. Hemoglobin a1c 5.9. Monitor. 7. Left thigh pain: Concern for compartment syndrome. Orthopedics x2 consulted. Prophylactic fasciotomy considered but patient and family declining at this time. Encourage mobilization. May need PT/OT. 8. Hypervolemic hyponatremia: Resolved. Managed with volume removal. On Bumex and HD. 9. Leukocytosis: ? Contraction vs stress vs infection. Monitor. 10. Hypertension: Started on Coreg. Monitor and adjust prn. Dispo: Has been accepted at [facility]. NOTE: This report was transcribed using voice recognition software. Every effort was made to ensure accuracy; however, inadvertent computerized transcription errors may be present. Electronically signed by Moreno Avalos PA-C on 8/26/2022 at 8:26 AM    30 minutes time spent reviewing patient chart, assessing patient, discussing plan of care with patient and family, discussing plan of care with collaborating physician, and charting.     HOSPITALIST ATTENDING PHYSICIAN NOTE 8/26/2022 2028PM:    Details of the evaluation - subjective assessment (including medication profile, past medical, family and social history when applicable), examination, review of lab and test data, diagnostic impressions and medical decision making - performed by Moreno Avalos PA-C, were discussed with me on the date of service and I agree with clinical information herein unless otherwise noted. The patient has been evaluated by me personally earlier today. Pt reports no fevers, chills,n/v.    Exam: heart reg at rate of 96, lungs cta, abd pos bs soft nt, ext neg for le edema    I agree with the assessment and plan of Daphnie Echevarria PA-C    Rhabdomyolysis  Manjinder  Hyperkalemia  Elevated lfts  Hyperglycemia  hyponatremia  Leukocytosis  Hyponatremia    Pt with increasing leukocytosis. Will ask ID to see. Electronically signed by Luz Urbano D.O.   Hospitalist  [facility]ist [facility] at [facility]

## 2022-08-26 NOTE — PROGRESS NOTES
Attempted to get blood work from patient this AM, patient refuses blood work stating \"I don't want to be poked again, I'm gonna refuse that. \"

## 2022-08-26 NOTE — DISCHARGE INSTR - COC
Continuity of Care Form    Patient Name: Heidy Dorsey   :  1985  MRN:  [id number]    Admit date:  2022  Discharge date:  ***    Code Status Order: Full Code   Advance Directives:   Advance Care Flowsheet Documentation       Date/Time Healthcare Directive Type of Healthcare Directive Copy in [address] Agent's Name Healthcare Agent's Phone Number    22 1256 No, patient does not have an advance directive for healthcare treatment -- -- -- -- --            Admitting Physician:  Glo Saldaña DO  PCP: No primary care provider on file. Discharging Nurse: [facility] Unit/Room#: 6881/0914-G  Discharging Unit Phone Number: ***    Emergency Contact:   Extended Emergency Contact Information  Primary Emergency Contact: Sunshine Huggins  Address: [address] [place] [address], [address] [facility] [address] Phone: [phone]  Mobile Phone: [phone]  Relation: Parent  Secondary Emergency Contact: [address] [place] [address] Road Phone: [phone]  Relation: Other    Past Surgical History:  History reviewed. No pertinent surgical history. Immunization History: There is no immunization history on file for this patient. Active Problems:  Patient Active Problem List   Diagnosis Code    Hyperkalemia E87.5    Acute kidney injury [facility]) N17.9    Fall W19. XXXA    IV infiltrate, initial encounter T80. 1XXA    Leukocytosis D72.829    Traumatic rhabdomyolysis (HCC) T79. 6XXA    Elevated LFTs R79.89       Isolation/Infection:   Isolation            No Isolation          Patient Infection Status       None to display            Nurse Assessment:  Last Vital Signs: BP (!) 142/78   Pulse 84   Temp 97.7 °F (36.5 °C) (Oral)   Resp 18   Ht 5' 10\" (1.778 m)   Wt 203 lb 11.3 oz (92.4 kg)   SpO2 99%   BMI 29.23 kg/m²     Last documented pain score (0-10 scale): Pain Level: 10  Last Weight:   Wt Readings from Last 1 Encounters:   22 203 lb 11.3 oz (92.4 Assessment Score:  Readmission Risk              Risk of Unplanned Readmission:  17           Discharging to Facility/ Agency   Name: [facility]hishek Griffin  Address: [address] [place] [address]  Phone: [phone]  Fax: [phone]    Dialysis Facility (if applicable)   Name:  Address:  Dialysis Schedule:  Phone:  Fax:    / signature: Electronically signed by Cielo Amin RN on 8/26/2022 at 1:01 PM      PHYSICIAN SECTION    Prognosis: Good    Condition at Discharge: Stable    Rehab Potential (if transferring to Rehab): Good    Recommended Labs or Other Treatments After Discharge: ***    Physician Certification: I certify the above information and transfer of Vickie Cabello  is necessary for the continuing treatment of the diagnosis listed and that he requires LTAC for less 30 days.      Update Admission H&P: {CHP DME Changes in CFVLX:[id number]}    PHYSICIAN SIGNATURE:  {Esignature:[id number]}

## 2022-08-26 NOTE — PROGRESS NOTES
Occupational Therapy    OT order received. Chart reviewed. Pt off unit at dialysis. Will re-attempt eval when appropriate.     Anton Lemon, [facility], OTR/L [id number]

## 2022-08-26 NOTE — CARE COORDINATION
Phone conversation w/ pt's mother Saumya Davis [phone]- agreeable to [employer]. Accepted @ [facility]- they can take today or over weekend. Vascular consulted for tunneled cath required prior to discharge per renal. LETHA updated [phone]- notify when pt is discharging.  CALI in epicbon transport form on [facility], RNcase manager

## 2022-08-26 NOTE — PROGRESS NOTES
[facility] MD Phill Bernal MD Parks Kansky, MD Francois Fryer, CNP   Linda Anguiano, ANGELO  Progress Note    8/26/2022    SUBJECTIVE:   8/18: Complaining of pain in his left upper and lower extremities. Tingling in both extremities. Swelling, though in the left upper extremity due to infiltrated IV. (-) sob on room air. No cp/palp Appetite poor    8/20 seen in her room on RA , alert oriented cooperative stable bp .     8/21 low UO swelling noted     8/22 getting temp IJ HD line then plan daily HD for solute and volume management     8.23 seen on HD tolerating treatment still with scrotal edema and Upper LUE edema . No sensation issues . Ortho note reviewed     8/24 HD today with no reported issues   Still no signs of recovery   Good PO intake    8/25 comfortable on RA , he is telling me he has some UO though not impressive . 8/26 : cr trending at anephric rate . Bp stable on RA , UO marginal    PROBLEM LIST:    Principal Problem:    Hyperkalemia  Active Problems:    Acute kidney injury ([facility] [address]    Fall    IV infiltrate, initial encounter    Leukocytosis    Traumatic rhabdomyolysis (HCC)    Elevated LFTs  Resolved Problems:    * No resolved hospital problems. *         DIET:    ADULT DIET;  Regular  ADULT ORAL NUTRITION SUPPLEMENT; Lunch, Dinner; Frozen Oral Supplement     MEDS (scheduled):    sennosides-docusate sodium  2 tablet Oral BID    carvedilol  6.25 mg Oral BID    sodium chloride flush  5-40 mL IntraVENous 2 times per day    morphine  4 mg IntraVENous Once    bumetanide  2 mg Oral Daily    sodium chloride flush  5-40 mL IntraVENous 2 times per day       MEDS (infusions):   sodium chloride      dextrose      sodium chloride      [Held by provider] sodium chloride Stopped (08/21/22 0745)       MEDS (prn):  labetalol, heparin (porcine), sodium chloride flush, sodium chloride, morphine, heparin flush, glucose, dextrose bolus **OR** dextrose bolus, glucagon (rDNA), dextrose, sodium chloride flush, sodium chloride, polyethylene glycol    PHYSICAL EXAM:     Patient Vitals for the past 24 hrs:   BP Temp Temp src Pulse Resp SpO2   08/26/22 1215 (!) 142/78 -- -- -- -- --   08/26/22 1018 (!) 170/83 97.7 °F (36.5 °C) Oral 84 18 --   08/26/22 0815 (!) 158/100 -- -- -- -- --   08/26/22 0345 (!) 188/104 -- -- 94 18 99 %   08/26/22 0010 -- -- -- -- 18 --   08/25/22 2013 -- -- -- -- 18 --   08/25/22 1930 (!) 178/109 98.8 °F (37.1 °C) Oral 89 18 99 %   08/25/22 1530 (!) 190/108 97.7 °F (36.5 °C) Oral 88 18 100 %     @    No intake or output data in the 24 hours ending 08/26/22 1335        Wt Readings from Last 3 Encounters:   08/24/22 203 lb 11.3 oz (92.4 kg)   04/30/18 145 lb (65.8 kg)   12/14/17 155 lb (70.3 kg)       Constitutional:  in no acute distress  HEENT: NC/AT, EOMI, sclera and conjunctiva are clear and anicteric, mucus membranes moist  Neck: Trachea midline, no JVD  Cardiovascular: S1, S2 regular rhythm, no murmur,or rub  Respiratory:  No crackles, no wheeze  Gastrointestinal:  Soft, nontender, nondistended, NABS  Ext: Left lower extremity edema, mild, though otherwise no edema, feet warm  Skin: dry, no rash  Neuro: awake, alert, interactive. Moves all 4 extremities      DATA:    Recent Labs     08/24/22  0613 08/25/22  0614 08/26/22  1107   WBC 12.7* 14.5* 16.6*   HGB 9.7* 9.3* 9.1*   HCT 27.4* 26.9* 26.3*   MCV 84.0 85.7 86.8    167 180       Recent Labs     08/24/22  0613 08/25/22  0614 08/26/22  0434   * 132 131*   K 4.4 4.3 4.8   CL 94* 96* 96*   CO2 23 26 24   BUN 59* 42* 53*   CREATININE 9.5* 7.6* 9.1*   ALT  --  179*  --    AST  --  179*  --    BILITOT  --  0.8  --    ALKPHOS  --  46  --          Lab Results   Component Value Date    LABPROT 2.0 (H) 08/17/2022    LABPROT 2.0 08/17/2022       ASSESSMENT     1. Acute kidney injury, severe, oliguric secondary to rhabdomyolysis, itself severe    2.   Rhabdomyolysis, severe, total CK on presentation 398,000, improving to total CK 8/18 morning 155,080. Awaiting reporting of this morning's true total CK (currently just listed as greater than 22,000). Does not appear to have compartment syndrome in either left upper or lower extremity, though both are quite sore, and he complains of tingling. 3.  Hyperkalemia due to HELGA, and rhabdomyolysis. 4.  Hyperphosphatemia, due to rhabdomyolysis, HELGA    5. Transaminitis, improving    Plan :      Azotemia progressing with pt becoming hyper volemic and continue to be oligouric   Temp RIJ HD line 8/22   Cr continue to trend at anephric rate    HD MWF .  Will ask vascular team to proceed with Tunneled HD line  Bumex 2 mg po daily   Daily bmp , ck   Fu signs of recovery           Electronically signed by Bell Resendez MD on 8/26/2022

## 2022-08-27 LAB
ANION GAP SERPL CALCULATED.3IONS-SCNC: 9 MMOL/L (ref 7–16)
BASOPHILS ABSOLUTE: 0.06 E9/L (ref 0–0.2)
BASOPHILS RELATIVE PERCENT: 0.5 % (ref 0–2)
BUN BLDV-MCNC: 37 MG/DL (ref 6–20)
CALCIUM SERPL-MCNC: 7.9 MG/DL (ref 8.6–10.2)
CHLORIDE BLD-SCNC: 98 MMOL/L (ref 98–107)
CO2: 26 MMOL/L (ref 22–29)
CREAT SERPL-MCNC: 6.9 MG/DL (ref 0.7–1.2)
EOSINOPHILS ABSOLUTE: 0.47 E9/L (ref 0.05–0.5)
EOSINOPHILS RELATIVE PERCENT: 3.6 % (ref 0–6)
GFR AFRICAN AMERICAN: 11
GFR NON-AFRICAN AMERICAN: 11 ML/MIN/1.73
GLUCOSE BLD-MCNC: 109 MG/DL (ref 74–99)
HCT VFR BLD CALC: 24 % (ref 37–54)
HEMOGLOBIN: 8.2 G/DL (ref 12.5–16.5)
IMMATURE GRANULOCYTES #: 0.11 E9/L
IMMATURE GRANULOCYTES %: 0.8 % (ref 0–5)
LACTIC ACID: 1.2 MMOL/L (ref 0.5–2.2)
LYMPHOCYTES ABSOLUTE: 2.83 E9/L (ref 1.5–4)
LYMPHOCYTES RELATIVE PERCENT: 21.6 % (ref 20–42)
MCH RBC QN AUTO: 30.4 PG (ref 26–35)
MCHC RBC AUTO-ENTMCNC: 34.2 % (ref 32–34.5)
MCV RBC AUTO: 88.9 FL (ref 80–99.9)
MONOCYTES ABSOLUTE: 1.23 E9/L (ref 0.1–0.95)
MONOCYTES RELATIVE PERCENT: 9.4 % (ref 2–12)
NEUTROPHILS ABSOLUTE: 8.38 E9/L (ref 1.8–7.3)
NEUTROPHILS RELATIVE PERCENT: 64.1 % (ref 43–80)
PDW BLD-RTO: 15.2 FL (ref 11.5–15)
PLATELET # BLD: 190 E9/L (ref 130–450)
PMV BLD AUTO: 10.5 FL (ref 7–12)
POTASSIUM SERPL-SCNC: 4.9 MMOL/L (ref 3.5–5)
RBC # BLD: 2.7 E12/L (ref 3.8–5.8)
SODIUM BLD-SCNC: 133 MMOL/L (ref 132–146)
WBC # BLD: 13.1 E9/L (ref 4.5–11.5)

## 2022-08-27 PROCEDURE — 36415 COLL VENOUS BLD VENIPUNCTURE: CPT

## 2022-08-27 PROCEDURE — 2500000003 HC RX 250 WO HCPCS: Performed by: INTERNAL MEDICINE

## 2022-08-27 PROCEDURE — 80048 BASIC METABOLIC PNL TOTAL CA: CPT

## 2022-08-27 PROCEDURE — 6360000002 HC RX W HCPCS: Performed by: STUDENT IN AN ORGANIZED HEALTH CARE EDUCATION/TRAINING PROGRAM

## 2022-08-27 PROCEDURE — 6370000000 HC RX 637 (ALT 250 FOR IP): Performed by: INTERNAL MEDICINE

## 2022-08-27 PROCEDURE — 99232 SBSQ HOSP IP/OBS MODERATE 35: CPT | Performed by: INTERNAL MEDICINE

## 2022-08-27 PROCEDURE — 2580000003 HC RX 258: Performed by: ORTHOPAEDIC SURGERY

## 2022-08-27 PROCEDURE — 83605 ASSAY OF LACTIC ACID: CPT

## 2022-08-27 PROCEDURE — 85025 COMPLETE CBC W/AUTO DIFF WBC: CPT

## 2022-08-27 PROCEDURE — 6370000000 HC RX 637 (ALT 250 FOR IP): Performed by: PHYSICIAN ASSISTANT

## 2022-08-27 PROCEDURE — 2060000000 HC ICU INTERMEDIATE R&B

## 2022-08-27 PROCEDURE — 2580000003 HC RX 258: Performed by: NURSE PRACTITIONER

## 2022-08-27 PROCEDURE — 90935 HEMODIALYSIS ONE EVALUATION: CPT

## 2022-08-27 RX ADMIN — CARVEDILOL 6.25 MG: 6.25 TABLET, FILM COATED ORAL at 20:00

## 2022-08-27 RX ADMIN — LABETALOL HYDROCHLORIDE 10 MG: 5 INJECTION INTRAVENOUS at 16:20

## 2022-08-27 RX ADMIN — Medication 10 ML: at 20:01

## 2022-08-27 RX ADMIN — BUMETANIDE 2 MG: 1 TABLET ORAL at 09:53

## 2022-08-27 RX ADMIN — MORPHINE SULFATE 4 MG: 4 INJECTION, SOLUTION INTRAMUSCULAR; INTRAVENOUS at 09:53

## 2022-08-27 RX ADMIN — MORPHINE SULFATE 4 MG: 4 INJECTION, SOLUTION INTRAMUSCULAR; INTRAVENOUS at 01:18

## 2022-08-27 RX ADMIN — Medication 10 ML: at 09:45

## 2022-08-27 RX ADMIN — CARVEDILOL 6.25 MG: 6.25 TABLET, FILM COATED ORAL at 09:53

## 2022-08-27 RX ADMIN — MORPHINE SULFATE 4 MG: 4 INJECTION, SOLUTION INTRAMUSCULAR; INTRAVENOUS at 23:10

## 2022-08-27 RX ADMIN — Medication 10 ML: at 09:53

## 2022-08-27 RX ADMIN — LABETALOL HYDROCHLORIDE 10 MG: 5 INJECTION INTRAVENOUS at 05:29

## 2022-08-27 RX ADMIN — MORPHINE SULFATE 4 MG: 4 INJECTION, SOLUTION INTRAMUSCULAR; INTRAVENOUS at 05:20

## 2022-08-27 RX ADMIN — Medication 10 ML: at 20:02

## 2022-08-27 RX ADMIN — MORPHINE SULFATE 4 MG: 4 INJECTION, SOLUTION INTRAMUSCULAR; INTRAVENOUS at 18:58

## 2022-08-27 RX ADMIN — MORPHINE SULFATE 4 MG: 4 INJECTION, SOLUTION INTRAMUSCULAR; INTRAVENOUS at 13:49

## 2022-08-27 ASSESSMENT — PAIN DESCRIPTION - LOCATION
LOCATION: LEG
LOCATION: HIP;BUTTOCKS
LOCATION: LEG
LOCATION: HIP;FOOT
LOCATION: COCCYX;BUTTOCKS;HIP

## 2022-08-27 ASSESSMENT — PAIN SCALES - GENERAL
PAINLEVEL_OUTOF10: 10

## 2022-08-27 ASSESSMENT — PAIN DESCRIPTION - ONSET
ONSET: ON-GOING
ONSET: ON-GOING

## 2022-08-27 ASSESSMENT — PAIN SCALES - WONG BAKER
WONGBAKER_NUMERICALRESPONSE: 0
WONGBAKER_NUMERICALRESPONSE: 0

## 2022-08-27 ASSESSMENT — PAIN DESCRIPTION - ORIENTATION
ORIENTATION: LEFT

## 2022-08-27 ASSESSMENT — PAIN DESCRIPTION - DESCRIPTORS
DESCRIPTORS: ACHING;DISCOMFORT;SHARP
DESCRIPTORS: ACHING;PRESSURE
DESCRIPTORS: ACHING;DISCOMFORT
DESCRIPTORS: ACHING;SQUEEZING

## 2022-08-27 ASSESSMENT — PAIN DESCRIPTION - PAIN TYPE
TYPE: ACUTE PAIN
TYPE: ACUTE PAIN

## 2022-08-27 ASSESSMENT — PAIN DESCRIPTION - FREQUENCY
FREQUENCY: CONTINUOUS
FREQUENCY: CONTINUOUS

## 2022-08-27 NOTE — PROGRESS NOTES
[facility] MD Kvng Lennon, MD Dorcas Smallwood, CNP   Linda Anguiano, ANGELO  Progress Note    8/27/2022    SUBJECTIVE:   8/18: Complaining of pain in his left upper and lower extremities. Tingling in both extremities. Swelling, though in the left upper extremity due to infiltrated IV. (-) sob on room air. No cp/palp Appetite poor    8/20 seen in her room on RA , alert oriented cooperative stable bp .     8/21 low UO swelling noted     8/22 getting temp IJ HD line then plan daily HD for solute and volume management     8.23 seen on HD tolerating treatment still with scrotal edema and Upper LUE edema . No sensation issues . Ortho note reviewed     8/24 HD today with no reported issues   Still no signs of recovery   Good PO intake    8/25 comfortable on RA , he is telling me he has some UO though not impressive . 8/26 : cr trending at anephric rate . Bp stable on RA , UO marginal      8/27: Ongoing left upper and left lower extremity pain, cramps, aching. Good appetite and intake. HD yesterday without event, 2 L net UF. No SOB on room air. PROBLEM LIST:    Principal Problem:    Hyperkalemia  Active Problems:    Acute kidney injury ([facility] [address]    Fall    IV infiltrate, initial encounter    Leukocytosis    Traumatic rhabdomyolysis (HCC)    Elevated LFTs  Resolved Problems:    * No resolved hospital problems. *         DIET:    ADULT DIET;  Regular  ADULT ORAL NUTRITION SUPPLEMENT; Lunch, Dinner; Frozen Oral Supplement     MEDS (scheduled):    sennosides-docusate sodium  2 tablet Oral BID    carvedilol  6.25 mg Oral BID    sodium chloride flush  5-40 mL IntraVENous 2 times per day    morphine  4 mg IntraVENous Once    bumetanide  2 mg Oral Daily    sodium chloride flush  5-40 mL IntraVENous 2 times per day       MEDS (infusions):   sodium chloride      dextrose      sodium chloride         MEDS (prn):  labetalol, heparin (porcine), sodium chloride flush, sodium chloride, morphine, heparin flush, glucose, dextrose bolus **OR** dextrose bolus, glucagon (rDNA), dextrose, sodium chloride flush, sodium chloride, polyethylene glycol    PHYSICAL EXAM:     Patient Vitals for the past 24 hrs:   BP Temp Temp src Pulse Resp SpO2 Weight   08/27/22 0925 (!) 189/119 97.8 °F (36.6 °C) Oral (!) 106 20 99 % --   08/27/22 0550 -- -- -- -- 18 -- --   08/27/22 0520 (!) 188/90 -- -- 74 18 -- --   08/27/22 0148 -- -- -- -- 18 -- --   08/26/22 2113 -- -- -- -- 18 -- --   08/26/22 2043 (!) 175/91 98.1 °F (36.7 °C) Temporal 75 18 98 % --   08/26/22 1830 (!) 181/97 98 °F (36.7 °C) -- 79 18 -- 199 lb 4.7 oz (90.4 kg)   08/26/22 1800 (!) 177/109 -- -- 74 -- -- --   08/26/22 1730 (!) 200/121 -- -- 83 -- -- --   08/26/22 1700 (!) 194/123 -- -- 77 -- -- --   08/26/22 1630 (!) 196/123 -- -- 80 18 -- --   08/26/22 1600 (!) 184/122 -- -- 85 -- -- --   08/26/22 1530 (!) 191/117 -- -- 83 -- -- --   08/26/22 1500 (!) 197/125 -- -- 86 -- -- --   08/26/22 1454 (!) 196/117 -- -- 88 -- -- --   08/26/22 1445 (!) 196/120 98.1 °F (36.7 °C) -- 96 18 -- --   08/26/22 1419 (!) 183/107 98.4 °F (36.9 °C) Oral 96 18 98 % --   @      Intake/Output Summary (Last 24 hours) at 8/27/2022 1308  Last data filed at 8/27/2022 0925  Gross per 24 hour   Intake 240 ml   Output 2000 ml   Net -1760 ml         Wt Readings from Last 3 Encounters:   08/26/22 199 lb 4.7 oz (90.4 kg)   04/30/18 145 lb (65.8 kg)   12/14/17 155 lb (70.3 kg)       Constitutional:  in no acute distress  HEENT: NC/AT, EOMI, sclera and conjunctiva are clear and anicteric, mucus membranes moist  Neck: Trachea midline, no JVD  Cardiovascular: S1, S2 regular rhythm, no murmur,or rub  Respiratory:  No crackles, no wheeze  Gastrointestinal:  Soft, nontender, nondistended, NABS  Ext: Left lower extremity edema, mild, though otherwise no edema, feet warm  Skin: dry, no rash  Neuro: awake, alert, interactive.   Moves all 4 extremities      DATA:    Recent Labs 08/25/22  0614 08/26/22  1107 08/27/22  0550   WBC 14.5* 16.6* 13.1*   HGB 9.3* 9.1* 8.2*   HCT 26.9* 26.3* 24.0*   MCV 85.7 86.8 88.9    180 190     Recent Labs     08/25/22  0614 08/26/22  0434 08/27/22  0550    131* 133   K 4.3 4.8 4.9   CL 96* 96* 98   CO2 26 24 26   BUN 42* 53* 37*   CREATININE 7.6* 9.1* 6.9*   *  --   --    *  --   --    BILITOT 0.8  --   --    ALKPHOS 46  --   --        Lab Results   Component Value Date    LABPROT 2.0 (H) 08/17/2022    LABPROT 2.0 08/17/2022       Assessment  1. Acute kidney injury, severe, oliguric secondary to rhabdomyolysis, itself severe    2. Rhabdomyolysis, severe, total CK on presentation 398,000, improving to total CK 8/18 morning 155,080. Awaiting reporting of this morning's true total CK (currently just listed as greater than 22,000). Does not appear to have compartment syndrome in either left upper or lower extremity, though both are quite sore, and he complains of tingling. 3.  Hyperkalemia due to HELGA, and rhabdomyolysis. 4.  Hyperphosphatemia, due to rhabdomyolysis, HELGA    5.   Transaminitis, improving      hyper volemic and continues to be oligouric   Temp RIJ HD line 8/22   Cr continue to trend at anephric rate --no sign of recovery besides slight increase in urine output    Recommendations  Dialysis today for solute and volume clearance, x3 to 3.5 L UF  Will ask vascular team to proceed with Tunneled HD line  Continue Bumex 2 mg po daily   Daily bmp , ck   F\u signs of recovery   Barring recovery, Next dialysis on Monday        Electronically signed by Sally Moreno MD on 8/27/2022

## 2022-08-27 NOTE — CONSULTS
[address] [place] [address] [facility]  Consultation Note     Admit Date: 8/17/2022  9:25 AM    Reason for Consult:   Worsening leukocytosis    Attending Physician:  Rai Sparks DO    HISTORY OF PRESENT ILLNESS:             The history is obtained from extensive review of available past medical records. The patient is a 42 y.o. male who is not known to the ID service. Patient was admitted to [facility] on 8/70/22 due to syncopal episode after inhaling the vaping cigarette. Allergy denied using drugs, toxicology was positive for cocaine and fentanyl. He was admitted with rhabdomyolysis. His white count was elevated. He was seen by surgery for possible compartment syndrome. He was admitted to the ICU. Seen by GI for shock liver. He was seen by nephrology for acute kidney injury secondary to rhabdomyolysis. Hemodialysis was required and he had a catheter placed for hemodialysis on 8/22/2022. Another surgeon saw him for a second opinion for possible compartment syndrome. Once again they did not feel that this was the case. Patient still complaining of pain, especially left lower extremity. Still having swelling in the left upper extremity but the pain in the left upper extremity has improved somewhat. Past Medical History:        Diagnosis Date    Femur fracture [facility])      Past Surgical History:    History reviewed. No pertinent surgical history.   Current Medications:   Scheduled Meds:   sennosides-docusate sodium  2 tablet Oral BID    carvedilol  6.25 mg Oral BID    sodium chloride flush  5-40 mL IntraVENous 2 times per day    morphine  4 mg IntraVENous Once    bumetanide  2 mg Oral Daily    sodium chloride flush  5-40 mL IntraVENous 2 times per day     Continuous Infusions:   sodium chloride      dextrose      sodium chloride       PRN Meds:labetalol, heparin (porcine), sodium chloride flush, sodium chloride, morphine, heparin flush, glucose, dextrose bolus **OR** dextrose bolus, glucagon (rDNA), dextrose, sodium chloride flush, sodium chloride, polyethylene glycol    Allergies:  Patient has no known allergies. Social History:   Social History     Socioeconomic History    Marital status: Single     Spouse name: None    Number of children: None    Years of education: None    Highest education level: None   Tobacco Use    Smoking status: Every Day     Packs/day: 0.50     Types: Cigarettes     Last attempt to quit: 2017     Years since quittin.7    Smokeless tobacco: Never   Vaping Use    Vaping Use: Every day   Substance and Sexual Activity    Alcohol use: No    Drug use: Yes     Comment: \"Pills\" unknown pill type      Pets: None  Travel: None  The patient lives at home with his parents. He works in a [employer] History:       Problem Relation Age of Onset    Diabetes Mother     Hypertension Mother     Hypertension Father     Diabetes Father     Cancer Brother    . Otherwise non-pertinent to the chief complaint. REVIEW OF SYSTEMS:    Constitutional: Negative for fevers, chills, diaphoresis  Neurologic: Negative   Psychiatric: Negative  Rheumatologic: Negative   Endocrine: Negative  Hematologic: Negative  Immunologic: Negative  ENT: Negative  Respiratory: Negative   Cardiovascular: Negative  GI: Negative  : Negative  Musculoskeletal: As in the HPI  Skin: No rashes. PHYSICAL EXAM:    Vitals:   BP (!) 189/119   Pulse (!) 106   Temp 97.8 °F (36.6 °C) (Oral)   Resp 20   Ht 5' 10\" (1.778 m)   Wt 199 lb 4.7 oz (90.4 kg)   SpO2 99%   BMI 28.60 kg/m²   Constitutional: The patient is awake, alert, and oriented. He is lying in bed. He is in no distress. He is apathetic and minimally cooperative. He moans and does appear to be in pain when he moves around. Skin: Warm and dry. No rashes were noted. HEENT: Eyes show round, and reactive pupils. No jaundice. Moist mucous membranes, no ulcerations, no thrush. Neck: Supple to movements. No lymphadenopathy.    Chest: No use of accessory muscles to breathe. Symmetrical expansion. Auscultation reveals no wheezing, crackles, or rhonchi. Cardiovascular: S1 and S2 are rhythmic and regular. No murmurs appreciated. Abdomen: Positive bowel sounds to auscultation. Benign to palpation. No masses felt. No hepatosplenomegaly. Extremities: There is edema with induration of the muscles of the deltoid, biceps, triceps and somewhat of the forearm on the left side. Left lower extremity also seems to have some induration of the calf. There is no erythema or fluctuance. Lines: Right IJ HD catheter. Peripheral IV.     CBC+dif:  Recent Labs     08/25/22  0614 08/26/22  1107 08/27/22  0550   WBC 14.5* 16.6* 13.1*   HGB 9.3* 9.1* 8.2*   HCT 26.9* 26.3* 24.0*   MCV 85.7 86.8 88.9    180 190   NEUTROABS 10.08* 12.32* 8.38*     No results found for: CRP   No results found for: CRPHS  No results found for: SEDRATE  Lab Results   Component Value Date     (H) 08/25/2022     (H) 08/25/2022    ALKPHOS 46 08/25/2022    BILITOT 0.8 08/25/2022     Lab Results   Component Value Date/Time     08/27/2022 05:50 AM    K 4.9 08/27/2022 05:50 AM    K 4.4 08/24/2022 06:13 AM    CL 98 08/27/2022 05:50 AM    CO2 26 08/27/2022 05:50 AM    BUN 37 08/27/2022 05:50 AM    CREATININE 6.9 08/27/2022 05:50 AM    GFRAA 11 08/27/2022 05:50 AM    LABGLOM 11 08/27/2022 05:50 AM    GLUCOSE 109 08/27/2022 05:50 AM    PROT 4.3 08/25/2022 06:14 AM    LABALBU 2.2 08/25/2022 06:14 AM    CALCIUM 7.9 08/27/2022 05:50 AM    BILITOT 0.8 08/25/2022 06:14 AM    ALKPHOS 46 08/25/2022 06:14 AM     08/25/2022 06:14 AM     08/25/2022 06:14 AM       Lab Results   Component Value Date/Time    PROTIME 10.1 08/21/2022 05:39 AM    INR 0.9 08/21/2022 05:39 AM       No results found for: TSH    Lab Results   Component Value Date/Time    COLORU RED 08/18/2022 01:30 AM    PHUR 7.0 08/18/2022 01:30 AM    WBCUA 2-5 08/18/2022 01:30 AM    RBCUA 1-3 08/18/2022 01:30 AM    BACTERIA FEW 08/18/2022 01:30 AM    CLARITYU TURBID 08/18/2022 01:30 AM    SPECGRAV 1.025 08/18/2022 01:30 AM    LEUKOCYTESUR TRACE 08/18/2022 01:30 AM    UROBILINOGEN 2.0 08/18/2022 01:30 AM    BILIRUBINUR LARGE 08/18/2022 01:30 AM    BLOODU LARGE 08/18/2022 01:30 AM    GLUCOSEU 100 08/18/2022 01:30 AM       No results found for: HCO3, BE, O2SAT, PH, THGB, PCO2, PO2, TCO2  Radiology:  Noted    Microbiology:  Pending  No results for input(s): BC in the last 72 hours. No results for input(s): ORG in the last 72 hours. No results for input(s): Clarnce Lax in the last 72 hours. No results for input(s): STREPNEUMAGU in the last 72 hours. No results for input(s): LP1UAG in the last 72 hours. No results for input(s): ASO in the last 72 hours. No results for input(s): CULTRESP in the last 72 hours. No results for input(s): PROCAL in the last 72 hours. Assessment:  Drug overdose  Rhabdomyolysis  Acute kidney injury, requiring hemodialysis  Elevation of transaminases, improving  Leukocytosis. This is associated to the rhabdomyolysis and the fact that the patient is in a significant amount of pain. No evidence of ongoing infection  Scrotal edema associated to the above. No induration to suggest orchitis or epididymitis or scrotal abscess    Plan:    If she is suspecting infection and I suggest to do an adequate work-up  Antibiotics are not warranted    Thank you for having us see this patient in consultation. We will sign off the case for now. Case discussed with Dr. Laura Denny. call if needed.   Spoke with nursing    Jorge Heck MD  10:05 AM  8/27/2022

## 2022-08-27 NOTE — FLOWSHEET NOTE
08/27/22 1753   Vital Signs   BP (!) 183/100   Temp 98 °F (36.7 °C)   Heart Rate 82   Resp 18   Weight   (unable to weigh)   Weight Method   (pt on cart no scale available)   Post-Hemodialysis Assessment   Post-Treatment Procedures Blood returned;Catheter capped, clamped with Citrate x 2 ports   Machine Disinfection Process Acid/Vinegar Clean;Bleach; Machine Absence of [employer]; Exterior Machine Disinfection   Rinseback Volume (ml) 300 ml   Blood Volume Processed (Liters) 59.7 l/min   Dialyzer Clearance Lightly streaked   Duration of Treatment (minutes) 210 minutes   Heparin Amount Administered During Treatment (mL) 0 mL   Hemodialysis Intake (ml) 300 ml   Hemodialysis Output (ml) 3000 ml   NET Removed (ml) 2700   Tolerated Treatment Good   Patient Response to Treatment tolerated tx well, 2.7L fluids removed   Time Off 2121   Patient Disposition Return to room   Tolerated  HD per orders,  2700 ml removed with out difficulty. HD CVC flushed, citrate to dwell, clamped and capped  post tx per policy. Report to floor RN, remains in care of staff.

## 2022-08-27 NOTE — PROGRESS NOTES
Physical Therapy  Facility/Department: [address] INTERNAL MEDICINE 2    Name: King Kanwal  : 1985  MRN: [id number]    Chart reviewed and PT eval attempted this date. Pt kindly declined at this time due to swelling in his scrotum. Pt reported that when he moves, the swelling gets worse and more painful. Will check back at later time/date.      Connor Molina, [address]

## 2022-08-27 NOTE — PROGRESS NOTES
[facility] Progress Note    Admitting Date and Time: 8/17/2022  9:25 AM  Admit Dx: Hyperkalemia [E87.5]  Shock liver [K72.00]  Acute kidney injury ([address] [N17.9]  Fall, initial encounter [W19. XXXA]  Traumatic rhabdom[facility], [facility] encounter ([facility] Deisy Party. 6XXA]  Leukocytosis, unspecified type [D72.829]    Subjective:  Patient is being followed for Hyperkalemia [E87.5]  Shock liver [K72.00]  Acute kidney injury ([facility] [N17.9]  Fall, initial encounter [W19. XXXA]  Traumatic rhabdomyolysis, initial encounter ([facility] Deisy Party. 6XXA]  Leukocytosis, unspecified type [D72.829]     Patient lying in bed in no acute distress. Reports increasing pain in left lower extremity. States he is having difficulty walking due to scrotal swelling. Encouraged increased mobilization.      ROS: denies fever, chills, cp, sob, n/v, HA unless stated above.      sennosides-docusate sodium  2 tablet Oral BID    carvedilol  6.25 mg Oral BID    sodium chloride flush  5-40 mL IntraVENous 2 times per day    morphine  4 mg IntraVENous Once    bumetanide  2 mg Oral Daily    sodium chloride flush  5-40 mL IntraVENous 2 times per day     labetalol, 10 mg, Q4H PRN  heparin (porcine), 2,200 Units, PRN  sodium chloride flush, 5-40 mL, PRN  sodium chloride, , PRN  morphine, 4 mg, Q4H PRN  heparin flush, 100 Units, PRN  glucose, 4 tablet, PRN  dextrose bolus, 125 mL, PRN   Or  dextrose bolus, 250 mL, PRN  glucagon (rDNA), 1 mg, PRN  dextrose, , Continuous PRN  sodium chloride flush, 5-40 mL, PRN  sodium chloride, , PRN  polyethylene glycol, 17 g, Daily PRN       Objective:    BP (!) 188/90   Pulse 74   Temp 98.1 °F (36.7 °C) (Temporal)   Resp 18   Ht 5' 10\" (1.778 m)   Wt 199 lb 4.7 oz (90.4 kg)   SpO2 98%   BMI 28.60 kg/m²   General Appearance: alert and oriented to person, place and time and in no acute distress  Skin: warm and dry  Head: normocephalic and atraumatic  Eyes: pupils equal, round, and reactive to light, extraocular eye movements intact, conjunctivae normal  Neck: neck supple and non tender without mass   Pulmonary/Chest: clear to auscultation bilaterally- no wheezes, rales or rhonchi, normal air movement, no respiratory distress  Cardiovascular: normal rate, normal S1 and S2 and no carotid bruits  Abdomen: soft, non-tender, non-distended, normal bowel sounds, no masses or organomegaly  Extremities: no cyanosis, no clubbing and edema of bilateral lower extremities, edema of left upper extremity and hand, scrotal swelling   Neurologic: no cranial nerve deficit and speech normal        Recent Labs     08/25/22  0614 08/26/22  0434 08/27/22  0550    131* 133   K 4.3 4.8 4.9   CL 96* 96* 98   CO2 26 24 26   BUN 42* 53* 37*   CREATININE 7.6* 9.1* 6.9*   GLUCOSE 94 103* 109*   CALCIUM 7.9* 7.9* 7.9*         Recent Labs     08/25/22  0614 08/26/22  1107 08/27/22  0550   WBC 14.5* 16.6* 13.1*   RBC 3.14* 3.03* 2.70*   HGB 9.3* 9.1* 8.2*   HCT 26.9* 26.3* 24.0*   MCV 85.7 86.8 88.9   MCH 29.6 30.0 30.4   MCHC 34.6* 34.6* 34.2   RDW 14.8 14.7 15.2*    180 190   MPV 10.4 10.2 10.5         Radiology: None    Assessment:    Principal Problem:    Hyperkalemia  Active Problems:    Acute kidney injury ([address]    Fall    IV infiltrate, initial encounter    Leukocytosis    Traumatic rhabdomyolysis (HCC)    Elevated LFTs  Resolved Problems:    * No resolved hospital problems. *      Plan:  1. Rhabdomyolysis: S/p fall at home. Unknown down time, but was seen well 24 hours prior to admission. Presented with severe left leg and arm pain. CK initially >350,000 now down to 87,477>3,354. Developed anasarca and worsening renal failure after IVF resuscitation. 2. HELGA: Felt secondary to rhabdomyolysis. Treated initially with IVF, but patient developed anasarca and concern for compartment syndrome. Received IV Bumex. Renal function worsened, with peak at 12.8. Temporary HD cath inserted and HD started on 8/22.  Received daily HD, held on 8/25 then Cr worsened to 9.1. Avoid nephrotoxic agents. Nephrology following. Plans for tunneled cath. Vascular consulted. 3. Transaminitis: Due to rhabdo. Tylenol was unremarkable. Abdominal imagining negative. UDS positive for cocaine and fentanyl. Avoid heptotoxic agents. GI consulted. No immediate plans for intervention. Should follow up in the office 2-3 weeks after discharge. 4. Hyperkalemia: Resolved. K on admission 5.6. Ca+ gluconate, HCO3, Regular Insulin and lokelma given in ED. EKG in ED showing no evidence of acute ischemic injury, but peaked T waves in multiple leads. Today 4.3. Monitor. Nephrology following. 5. Drug Use: Admitted to taking \"pills\" on admission. UDS positive for cocaine and fentanyl. Encourage cessation. 6. Elevated glucose: No known DM. On admission glucose was 487. Now in the 100s. Hemoglobin a1c 5.9. Monitor. 7. Left thigh pain: Concern for compartment syndrome. Orthopedics x2 consulted. Prophylactic fasciotomy considered but patient and family declining at this time. Encourage mobilization. May need PT/OT. 8. Hypervolemic hyponatremia: Resolved. Managed with volume removal. On Bumex and HD. 9. Leukocytosis:  ID consulted. Believe this is associated to rhabdo, and pain. Antibiotics not warranted. 10. Hypertension: Started on Coreg. Monitor and adjust prn. Dispo: Has been accepted at [facility]. NOTE: This report was transcribed using voice recognition software. Every effort was made to ensure accuracy; however, inadvertent computerized transcription errors may be present. Electronically signed by Montse Knowles PA-C on 8/27/2022 at 8:40 AM    27 minutes time spent reviewing patient chart, assessing patient, discussing plan of care with patient and family, discussing plan of care with collaborating physician, and charting.     HOSPITALIST ATTENDING PHYSICIAN NOTE 8/27/2022 1905PM:    Details of the evaluation - subjective assessment (including medication profile, past medical, family and social history when applicable), examination, review of lab and test data, diagnostic impressions and medical decision making - performed by Ej Puentes PA-C, were discussed with me on the date of service and I agree with clinical information herein unless otherwise noted. The patient has been evaluated by me personally earlier today. Pt reports no fevers, chills,n/v.     Exam: heart reg at rate of 76,lungs cta, abd pos bs soft nt, ext neg for le edema    I agree with the assessment and plan of Daphnie Echevarria PA-C    Rhabdomyolysis  Manjinder  Hyperkalemia  Elevated lfts  Hyperglycemia  hyponatremia  Leukocytosis  Hyponatremia    D/w Dr Giuseppe Phillip. Appreciate input. Continue to monitor pt closely    Electronically signed by Sera Cabello D.O.   Hospitalist  [facility]ist [facility] at [facility]

## 2022-08-28 LAB
ALBUMIN SERPL-MCNC: 2.5 G/DL (ref 3.5–5.2)
ALP BLD-CCNC: 43 U/L (ref 40–129)
ALT SERPL-CCNC: 125 U/L (ref 0–40)
ANION GAP SERPL CALCULATED.3IONS-SCNC: 7 MMOL/L (ref 7–16)
AST SERPL-CCNC: 81 U/L (ref 0–39)
BASOPHILS ABSOLUTE: 0.04 E9/L (ref 0–0.2)
BASOPHILS RELATIVE PERCENT: 0.4 % (ref 0–2)
BILIRUB SERPL-MCNC: 0.6 MG/DL (ref 0–1.2)
BILIRUBIN DIRECT: <0.2 MG/DL (ref 0–0.3)
BILIRUBIN, INDIRECT: ABNORMAL MG/DL (ref 0–1)
BUN BLDV-MCNC: 28 MG/DL (ref 6–20)
CALCIUM SERPL-MCNC: 7.8 MG/DL (ref 8.6–10.2)
CHLORIDE BLD-SCNC: 100 MMOL/L (ref 98–107)
CO2: 29 MMOL/L (ref 22–29)
CREAT SERPL-MCNC: 5.4 MG/DL (ref 0.7–1.2)
EOSINOPHILS ABSOLUTE: 0.4 E9/L (ref 0.05–0.5)
EOSINOPHILS RELATIVE PERCENT: 3.5 % (ref 0–6)
GFR AFRICAN AMERICAN: 14
GFR NON-AFRICAN AMERICAN: 14 ML/MIN/1.73
GLUCOSE BLD-MCNC: 102 MG/DL (ref 74–99)
HCT VFR BLD CALC: 22.6 % (ref 37–54)
HEMOGLOBIN: 7.5 G/DL (ref 12.5–16.5)
IMMATURE GRANULOCYTES #: 0.07 E9/L
IMMATURE GRANULOCYTES %: 0.6 % (ref 0–5)
LYMPHOCYTES ABSOLUTE: 2.56 E9/L (ref 1.5–4)
LYMPHOCYTES RELATIVE PERCENT: 22.5 % (ref 20–42)
MAGNESIUM: 2 MG/DL (ref 1.6–2.6)
MCH RBC QN AUTO: 29.5 PG (ref 26–35)
MCHC RBC AUTO-ENTMCNC: 33.2 % (ref 32–34.5)
MCV RBC AUTO: 89 FL (ref 80–99.9)
MONOCYTES ABSOLUTE: 1.09 E9/L (ref 0.1–0.95)
MONOCYTES RELATIVE PERCENT: 9.6 % (ref 2–12)
NEUTROPHILS ABSOLUTE: 7.22 E9/L (ref 1.8–7.3)
NEUTROPHILS RELATIVE PERCENT: 63.4 % (ref 43–80)
PDW BLD-RTO: 15.3 FL (ref 11.5–15)
PHOSPHORUS: 4.1 MG/DL (ref 2.5–4.5)
PLATELET # BLD: 214 E9/L (ref 130–450)
PMV BLD AUTO: 10.2 FL (ref 7–12)
POTASSIUM SERPL-SCNC: 4.9 MMOL/L (ref 3.5–5)
RBC # BLD: 2.54 E12/L (ref 3.8–5.8)
SODIUM BLD-SCNC: 136 MMOL/L (ref 132–146)
TOTAL CK: 2997 U/L (ref 20–200)
TOTAL PROTEIN: 4.7 G/DL (ref 6.4–8.3)
WBC # BLD: 11.4 E9/L (ref 4.5–11.5)

## 2022-08-28 PROCEDURE — 6370000000 HC RX 637 (ALT 250 FOR IP): Performed by: PHYSICIAN ASSISTANT

## 2022-08-28 PROCEDURE — 2580000003 HC RX 258: Performed by: ORTHOPAEDIC SURGERY

## 2022-08-28 PROCEDURE — 36415 COLL VENOUS BLD VENIPUNCTURE: CPT

## 2022-08-28 PROCEDURE — 6360000002 HC RX W HCPCS: Performed by: STUDENT IN AN ORGANIZED HEALTH CARE EDUCATION/TRAINING PROGRAM

## 2022-08-28 PROCEDURE — 82550 ASSAY OF CK (CPK): CPT

## 2022-08-28 PROCEDURE — 85025 COMPLETE CBC W/AUTO DIFF WBC: CPT

## 2022-08-28 PROCEDURE — 84100 ASSAY OF PHOSPHORUS: CPT

## 2022-08-28 PROCEDURE — 80048 BASIC METABOLIC PNL TOTAL CA: CPT

## 2022-08-28 PROCEDURE — 99232 SBSQ HOSP IP/OBS MODERATE 35: CPT | Performed by: INTERNAL MEDICINE

## 2022-08-28 PROCEDURE — 6370000000 HC RX 637 (ALT 250 FOR IP): Performed by: INTERNAL MEDICINE

## 2022-08-28 PROCEDURE — 83735 ASSAY OF MAGNESIUM: CPT

## 2022-08-28 PROCEDURE — 80076 HEPATIC FUNCTION PANEL: CPT

## 2022-08-28 PROCEDURE — 2060000000 HC ICU INTERMEDIATE R&B

## 2022-08-28 PROCEDURE — 2580000003 HC RX 258: Performed by: NURSE PRACTITIONER

## 2022-08-28 RX ORDER — AMLODIPINE BESYLATE 5 MG/1
5 TABLET ORAL DAILY
Status: DISCONTINUED | OUTPATIENT
Start: 2022-08-28 | End: 2022-09-02 | Stop reason: HOSPADM

## 2022-08-28 RX ORDER — CARVEDILOL 6.25 MG/1
12.5 TABLET ORAL 2 TIMES DAILY
Status: DISCONTINUED | OUTPATIENT
Start: 2022-08-28 | End: 2022-09-02 | Stop reason: HOSPADM

## 2022-08-28 RX ADMIN — AMLODIPINE BESYLATE 5 MG: 5 TABLET ORAL at 11:53

## 2022-08-28 RX ADMIN — MORPHINE SULFATE 4 MG: 4 INJECTION, SOLUTION INTRAMUSCULAR; INTRAVENOUS at 07:43

## 2022-08-28 RX ADMIN — Medication 10 ML: at 07:48

## 2022-08-28 RX ADMIN — MORPHINE SULFATE 4 MG: 4 INJECTION, SOLUTION INTRAMUSCULAR; INTRAVENOUS at 20:04

## 2022-08-28 RX ADMIN — DOCUSATE SODIUM 50 MG AND SENNOSIDES 8.6 MG 2 TABLET: 8.6; 5 TABLET, FILM COATED ORAL at 07:43

## 2022-08-28 RX ADMIN — BUMETANIDE 2 MG: 1 TABLET ORAL at 07:43

## 2022-08-28 RX ADMIN — CARVEDILOL 12.5 MG: 6.25 TABLET, FILM COATED ORAL at 20:05

## 2022-08-28 RX ADMIN — CARVEDILOL 6.25 MG: 6.25 TABLET, FILM COATED ORAL at 07:43

## 2022-08-28 RX ADMIN — MORPHINE SULFATE 4 MG: 4 INJECTION, SOLUTION INTRAMUSCULAR; INTRAVENOUS at 16:06

## 2022-08-28 RX ADMIN — DOCUSATE SODIUM 50 MG AND SENNOSIDES 8.6 MG 2 TABLET: 8.6; 5 TABLET, FILM COATED ORAL at 20:05

## 2022-08-28 RX ADMIN — MORPHINE SULFATE 4 MG: 4 INJECTION, SOLUTION INTRAMUSCULAR; INTRAVENOUS at 11:53

## 2022-08-28 RX ADMIN — MORPHINE SULFATE 4 MG: 4 INJECTION, SOLUTION INTRAMUSCULAR; INTRAVENOUS at 03:09

## 2022-08-28 RX ADMIN — SODIUM CHLORIDE, PRESERVATIVE FREE 10 ML: 5 INJECTION INTRAVENOUS at 11:51

## 2022-08-28 RX ADMIN — Medication 10 ML: at 20:12

## 2022-08-28 ASSESSMENT — PAIN DESCRIPTION - PAIN TYPE: TYPE: ACUTE PAIN

## 2022-08-28 ASSESSMENT — PAIN DESCRIPTION - LOCATION
LOCATION: LEG
LOCATION: LEG;ARM

## 2022-08-28 ASSESSMENT — PAIN SCALES - GENERAL
PAINLEVEL_OUTOF10: 5
PAINLEVEL_OUTOF10: 5
PAINLEVEL_OUTOF10: 10

## 2022-08-28 ASSESSMENT — PAIN DESCRIPTION - ONSET: ONSET: ON-GOING

## 2022-08-28 ASSESSMENT — PAIN DESCRIPTION - ORIENTATION
ORIENTATION: LEFT

## 2022-08-28 ASSESSMENT — PAIN DESCRIPTION - DESCRIPTORS
DESCRIPTORS: ACHING;PRESSURE
DESCRIPTORS: CRAMPING
DESCRIPTORS: ACHING

## 2022-08-28 ASSESSMENT — PAIN DESCRIPTION - FREQUENCY: FREQUENCY: CONTINUOUS

## 2022-08-28 ASSESSMENT — PAIN SCALES - WONG BAKER: WONGBAKER_NUMERICALRESPONSE: 0

## 2022-08-28 NOTE — PROGRESS NOTES
[facility] MD Eleonora Renee MD Jamel Abide, MD Suzzanna Pepper, CNP   Linda Anguiano, ANGELO  Progress Note    8/28/2022    SUBJECTIVE:   8/18: Complaining of pain in his left upper and lower extremities. Tingling in both extremities. Swelling, though in the left upper extremity due to infiltrated IV. (-) sob on room air. No cp/palp Appetite poor    8/20 seen in her room on RA , alert oriented cooperative stable bp .     8/21 low UO swelling noted     8/22 getting temp IJ HD line then plan daily HD for solute and volume management     8.23 seen on HD tolerating treatment still with scrotal edema and Upper LUE edema . No sensation issues . Ortho note reviewed     8/24 HD today with no reported issues   Still no signs of recovery   Good PO intake    8/25 comfortable on RA , he is telling me he has some UO though not impressive . 8/26 : cr trending at anephric rate . Bp stable on RA , UO marginal      8/27: Ongoing left upper and left lower extremity pain, cramps, aching. Good appetite and intake. HD yesterday without event, 2 L net UF. No SOB on room air.    8/28: cramping. Swelling, though more diffusely. Urine output is picked up    PROBLEM LIST:    Principal Problem:    Hyperkalemia  Active Problems:    Acute kidney injury ([facility]    Fall    IV infiltrate, initial encounter    Leukocytosis    Traumatic rhabdomyolysis (HCC)    Elevated LFTs  Resolved Problems:    * No resolved hospital problems. *         DIET:    ADULT DIET;  Regular  ADULT ORAL NUTRITION SUPPLEMENT; Lunch, Dinner; Frozen Oral Supplement  Diet NPO Exceptions are: Sips of Water with Meds, Ice Chips     MEDS (scheduled):    carvedilol  12.5 mg Oral BID    amLODIPine  5 mg Oral Daily    sennosides-docusate sodium  2 tablet Oral BID    sodium chloride flush  5-40 mL IntraVENous 2 times per day    morphine  4 mg IntraVENous Once    bumetanide  2 mg Oral Daily    sodium chloride flush  5-40 mL IntraVENous 2 times per day       MEDS (infusions):   sodium chloride      dextrose      sodium chloride         MEDS (prn):  labetalol, heparin (porcine), sodium chloride flush, sodium chloride, morphine, heparin flush, glucose, dextrose bolus **OR** dextrose bolus, glucagon (rDNA), dextrose, sodium chloride flush, sodium chloride, polyethylene glycol    PHYSICAL EXAM:     Patient Vitals for the past 24 hrs:   BP Temp Temp src Pulse Resp SpO2   08/28/22 1625 (!) 159/103 98.2 °F (36.8 °C) Oral 97 18 97 %   08/28/22 1151 (!) 179/96 -- -- -- -- --   08/28/22 0734 (!) 167/102 98.1 °F (36.7 °C) Oral 90 18 98 %   08/28/22 0339 -- -- -- -- 18 --   08/28/22 0309 -- -- -- -- 18 --   08/28/22 0300 (!) 180/109 97.6 °F (36.4 °C) Oral 97 18 98 %   08/27/22 2340 -- -- -- -- 18 --   08/27/22 2310 -- -- -- -- 18 --   08/27/22 2000 (!) 153/96 98.2 °F (36.8 °C) Oral 97 18 98 %   08/27/22 1928 -- -- -- -- 18 --   08/27/22 1903 (!) 186/105 98.4 °F (36.9 °C) Oral 98 18 98 %   08/27/22 1753 (!) 183/100 98 °F (36.7 °C) -- 82 18 --   08/27/22 1730 (!) 173/95 -- -- 83 -- --   08/27/22 1700 (!) 178/106 -- -- 88 -- --   @      Intake/Output Summary (Last 24 hours) at 8/28/2022 1640  Last data filed at 8/27/2022 2328  Gross per 24 hour   Intake 785 ml   Output 3400 ml   Net -2615 ml         Wt Readings from Last 3 Encounters:   04/30/18 145 lb (65.8 kg)   12/14/17 155 lb (70.3 kg)   09/14/17 165 lb (74.8 kg)       Constitutional:  in no acute distress  HEENT: NC/AT, EOMI, sclera and conjunctiva are clear and anicteric, mucus membranes moist  Neck: Trachea midline, no JVD  Cardiovascular: S1, S2 regular rhythm, no murmur,or rub  Respiratory:  No crackles, no wheeze  Gastrointestinal:  Soft, nontender, nondistended, NABS  Ext: Left lower extremity edema, mild, though otherwise no edema, feet warm  Skin: dry, no rash  Neuro: awake, alert, interactive.   Moves all 4 extremities      DATA:    Recent Labs     08/26/22  1107 08/27/22  0550 08/28/22  0400   WBC 16.6* 13.1* 11.4   HGB 9.1* 8.2* 7.5*   HCT 26.3* 24.0* 22.6*   MCV 86.8 88.9 89.0    190 214     Recent Labs     08/26/22  0434 08/27/22  0550 08/28/22  0400   * 133 136   K 4.8 4.9 4.9   CL 96* 98 100   CO2 24 26 29   MG  --   --  2.0   PHOS  --   --  4.1   BUN 53* 37* 28*   CREATININE 9.1* 6.9* 5.4*   ALT  --   --  125*   AST  --   --  81*   BILIDIR  --   --  <0.2   BILITOT  --   --  0.6   ALKPHOS  --   --  43       Lab Results   Component Value Date    LABPROT 2.0 (H) 08/17/2022    LABPROT 2.0 08/17/2022       Assessment  1. Acute kidney injury, severe, oliguric secondary to rhabdomyolysis, itself severe    2. Rhabdomyolysis, severe, total CK on presentation 398,000, improving to total CK 8/18 morning 155,080. Awaiting reporting of this morning's true total CK (currently just listed as greater than 22,000). Does not appear to have compartment syndrome in either left upper or lower extremity, though both are quite sore, and he complains of tingling. 3.  Hyperkalemia due to HELGA, and rhabdomyolysis. 4.  Hyperphosphatemia, due to rhabdomyolysis, HELGA    5.   Transaminitis, improving      hyper volemic and continues to be oligouric   Temp RIJ HD line 8/22   Cr continue to trend at anephric rate --no sign of recovery besides slight increase in urine output    Recommendations  Plan for dialysis tomorrow solute and volume clearance  Check labs and UO in early a.m.; if showing signs recovery, may hold off on tunneled line  Otherwise, proceed with Tunneled HD line  Continue Bumex 2 mg po daily   Daily bmp , ck   F\u signs of recovery   Barring recovery, Next dialysis on Monday        Electronically signed by Eleonora Valle MD on 8/28/2022

## 2022-08-28 NOTE — PROGRESS NOTES
Messaged vascular per primary's request. Awaiting response. Matthew Flores, RN    [id number]  Spoke with Dr. Alivia Mitchell, he is unsure of when the cath will be placed.   Matthew Flores RN

## 2022-08-28 NOTE — PROGRESS NOTES
[facility] Progress Note    Admitting Date and Time: 8/17/2022  9:25 AM  Admit Dx: Hyperkalemia [E87.5]  Shock liver [K72.00]  Acute kidney injury ([facility] [N17.9]  Fall, initial encounter [W19. XXXA]  [facility] Curlee Favor. 6XXA]  Leukocytosis, unspecified type [D72.829]    Subjective:  Patient is being followed for Hyperkalemia [E87.5]  Shock liver [K72.00]  Acute kidney injury ([facility] [facility] [N17.9]  Fall, initial encounter [W19. XXXA]  Traumatic rhabdomyolysis, initial encounter ([facility] Curlee Favor. 6XXA]  Leukocytosis, unspecified type [D72.829]     Patient lying in bed in no acute distress. Reports mild improvement of hip and leg pain with mobilization.      ROS: denies fever, chills, cp, sob, n/v, HA unless stated above.      sennosides-docusate sodium  2 tablet Oral BID    carvedilol  6.25 mg Oral BID    sodium chloride flush  5-40 mL IntraVENous 2 times per day    morphine  4 mg IntraVENous Once    bumetanide  2 mg Oral Daily    sodium chloride flush  5-40 mL IntraVENous 2 times per day     labetalol, 10 mg, Q4H PRN  heparin (porcine), 2,200 Units, PRN  sodium chloride flush, 5-40 mL, PRN  sodium chloride, , PRN  morphine, 4 mg, Q4H PRN  heparin flush, 100 Units, PRN  glucose, 4 tablet, PRN  dextrose bolus, 125 mL, PRN   Or  dextrose bolus, 250 mL, PRN  glucagon (rDNA), 1 mg, PRN  dextrose, , Continuous PRN  sodium chloride flush, 5-40 mL, PRN  sodium chloride, , PRN  polyethylene glycol, 17 g, Daily PRN       Objective:    BP (!) 167/102   Pulse 90   Temp 98.1 °F (36.7 °C) (Oral)   Resp 18   Ht 5' 10\" (1.778 m)   Wt 199 lb 4.7 oz (90.4 kg)   SpO2 98%   BMI 28.60 kg/m²   General Appearance: alert and oriented to person, place and time and in no acute distress  Skin: warm and dry  Head: normocephalic and atraumatic  Eyes: pupils equal, round, and reactive to light, extraocular eye movements intact, conjunctivae normal  Neck: neck supple and non tender without mass Pulmonary/Chest: clear to auscultation bilaterally- no wheezes, rales or rhonchi, normal air movement, no respiratory distress  Cardiovascular: normal rate, normal S1 and S2 and no carotid bruits  Abdomen: soft, non-tender, non-distended, normal bowel sounds, no masses or organomegaly  Extremities: no cyanosis, no clubbing and edema of bilateral lower extremities, edema of left upper extremity and hand  Neurologic: no cranial nerve deficit and speech normal        Recent Labs     08/26/22  0434 08/27/22  0550 08/28/22  0400   * 133 136   K 4.8 4.9 4.9   CL 96* 98 100   CO2 24 26 29   BUN 53* 37* 28*   CREATININE 9.1* 6.9* 5.4*   GLUCOSE 103* 109* 102*   CALCIUM 7.9* 7.9* 7.8*         Recent Labs     08/26/22  1107 08/27/22  0550 08/28/22  0400   WBC 16.6* 13.1* 11.4   RBC 3.03* 2.70* 2.54*   HGB 9.1* 8.2* 7.5*   HCT 26.3* 24.0* 22.6*   MCV 86.8 88.9 89.0   MCH 30.0 30.4 29.5   MCHC 34.6* 34.2 33.2   RDW 14.7 15.2* 15.3*    190 214   MPV 10.2 10.5 10.2         Radiology: None    Assessment:    Principal Problem:    Hyperkalemia  Active Problems:    Acute kidney injury ([facility]    Fall    IV infiltrate, initial encounter    Leukocytosis    Traumatic rhabdomyolysis (HCC)    Elevated LFTs  Resolved Problems:    * No resolved hospital problems. *      Plan:  1. Rhabdomyolysis: S/p fall at home. Unknown down time, but was seen well 24 hours prior to admission. Presented with severe left leg and arm pain. CK initially >350,000 now down to 11,468>6,953>2997. Developed anasarca and worsening renal failure after IVF resuscitation. 2. HELGA: Felt secondary to rhabdomyolysis. Treated initially with IVF, but patient developed anasarca and concern for compartment syndrome. Received IV Bumex. Renal function worsened, with peak at 12.8. Temporary HD cath inserted and HD started on 8/22. Received daily HD, held on 8/25 then Cr worsened to 9.1. HD restarted with improvement of Cr. Avoid nephrotoxic agents.  Nephrology following. Plans for tunneled cath. Vascular consulted. 3. Transaminitis: Due to rhabdo. Tylenol was unremarkable. Abdominal imagining negative. UDS positive for cocaine and fentanyl. Avoid heptotoxic agents. GI consulted. No immediate plans for intervention. Should follow up in the office 2-3 weeks after discharge. 4. Hyperkalemia: Resolved. K on admission 5.6. Ca+ gluconate, HCO3, Regular Insulin and lokelma given in ED. EKG in ED showing no evidence of acute ischemic injury, but peaked T waves in multiple leads. Today 4.3. Monitor. Nephrology following. 5. Drug Use: Admitted to taking \"pills\" on admission. UDS positive for cocaine and fentanyl. Encourage cessation. 6. Elevated glucose: No known DM. On admission glucose was 487. Now in the 100s. Hemoglobin a1c 5.9. Monitor. 7. Left thigh pain: Concern for compartment syndrome. Orthopedics x2 consulted. Prophylactic fasciotomy considered but patient and family declining at this time. Encourage mobilization. May need PT/OT. 8. Hypervolemic hyponatremia: Resolved. Managed with volume removal. On Bumex and HD. 9. Leukocytosis: Resolved. ID consulted. Believe this is associated to rhabdo, and pain. Antibiotics not warranted. 10. Hypertension:  Continue Coreg. Monitor and adjust prn. Dispo: Has been accepted at [facility]. NOTE: This report was transcribed using voice recognition software. Every effort was made to ensure accuracy; however, inadvertent computerized transcription errors may be present. Electronically signed by Leo Perez PA-C on 8/28/2022 at 8:22 AM    20 minutes time spent reviewing patient chart, assessing patient, discussing plan of care with patient and family, discussing plan of care with collaborating physician, and charting.     HOSPITALIST ATTENDING PHYSICIAN NOTE 8/28/2022 2102PM:    Details of the evaluation - subjective assessment (including medication profile, past medical, family and social history when applicable), examination, review of lab and test data, diagnostic impressions and medical decision making - performed by Lindsay Villanueva PA-C, were discussed with me on the date of service and I agree with clinical information herein unless otherwise noted. The patient has been evaluated by me personally at this time. Pt reports no fevers, chills,n/v.    Exam: heart reg at rate of 88,lungs cta, abd pos bs soft nt, ext neg for le edema    I agree with the assessment and plan of Daphnie Echevarria PA-C    Rhabdomyolysis  Manjinder  Hyperkalemia  Elevated lfts  Hyperglycemia  hyponatremia  Leukocytosis  Hyponatremia      Electronically signed by Elli Caballero D.O.   Hospitalist  4[facility] Hospitalist Service at [facility]

## 2022-08-29 LAB
ALBUMIN SERPL-MCNC: 2.6 G/DL (ref 3.5–5.2)
ALP BLD-CCNC: 48 U/L (ref 40–129)
ALT SERPL-CCNC: 104 U/L (ref 0–40)
ANION GAP SERPL CALCULATED.3IONS-SCNC: 9 MMOL/L (ref 7–16)
AST SERPL-CCNC: 57 U/L (ref 0–39)
BASOPHILS ABSOLUTE: 0.04 E9/L (ref 0–0.2)
BASOPHILS RELATIVE PERCENT: 0.3 % (ref 0–2)
BILIRUB SERPL-MCNC: 0.6 MG/DL (ref 0–1.2)
BUN BLDV-MCNC: 38 MG/DL (ref 6–20)
CALCIUM SERPL-MCNC: 8.1 MG/DL (ref 8.6–10.2)
CHLORIDE BLD-SCNC: 99 MMOL/L (ref 98–107)
CO2: 28 MMOL/L (ref 22–29)
CREAT SERPL-MCNC: 6.4 MG/DL (ref 0.7–1.2)
EOSINOPHILS ABSOLUTE: 0.38 E9/L (ref 0.05–0.5)
EOSINOPHILS RELATIVE PERCENT: 3.2 % (ref 0–6)
GFR AFRICAN AMERICAN: 12
GFR NON-AFRICAN AMERICAN: 12 ML/MIN/1.73
GLUCOSE BLD-MCNC: 105 MG/DL (ref 74–99)
HCT VFR BLD CALC: 21.2 % (ref 37–54)
HEMOGLOBIN: 7.1 G/DL (ref 12.5–16.5)
IMMATURE GRANULOCYTES #: 0.08 E9/L
IMMATURE GRANULOCYTES %: 0.7 % (ref 0–5)
INR BLD: 1
LYMPHOCYTES ABSOLUTE: 2.58 E9/L (ref 1.5–4)
LYMPHOCYTES RELATIVE PERCENT: 22 % (ref 20–42)
MAGNESIUM: 1.8 MG/DL (ref 1.6–2.6)
MCH RBC QN AUTO: 29.7 PG (ref 26–35)
MCHC RBC AUTO-ENTMCNC: 33.5 % (ref 32–34.5)
MCV RBC AUTO: 88.7 FL (ref 80–99.9)
MONOCYTES ABSOLUTE: 1.03 E9/L (ref 0.1–0.95)
MONOCYTES RELATIVE PERCENT: 8.8 % (ref 2–12)
NEUTROPHILS ABSOLUTE: 7.64 E9/L (ref 1.8–7.3)
NEUTROPHILS RELATIVE PERCENT: 65 % (ref 43–80)
PDW BLD-RTO: 15.3 FL (ref 11.5–15)
PHOSPHORUS: 4.8 MG/DL (ref 2.5–4.5)
PLATELET # BLD: 262 E9/L (ref 130–450)
PMV BLD AUTO: 9.9 FL (ref 7–12)
POTASSIUM SERPL-SCNC: 4.8 MMOL/L (ref 3.5–5)
PROTHROMBIN TIME: 11.5 SEC (ref 9.3–12.4)
RBC # BLD: 2.39 E12/L (ref 3.8–5.8)
SODIUM BLD-SCNC: 136 MMOL/L (ref 132–146)
TOTAL CK: 1780 U/L (ref 20–200)
TOTAL PROTEIN: 4.9 G/DL (ref 6.4–8.3)
WBC # BLD: 11.8 E9/L (ref 4.5–11.5)

## 2022-08-29 PROCEDURE — 85025 COMPLETE CBC W/AUTO DIFF WBC: CPT

## 2022-08-29 PROCEDURE — 6370000000 HC RX 637 (ALT 250 FOR IP): Performed by: INTERNAL MEDICINE

## 2022-08-29 PROCEDURE — 83735 ASSAY OF MAGNESIUM: CPT

## 2022-08-29 PROCEDURE — 36415 COLL VENOUS BLD VENIPUNCTURE: CPT

## 2022-08-29 PROCEDURE — 82550 ASSAY OF CK (CPK): CPT

## 2022-08-29 PROCEDURE — 6360000002 HC RX W HCPCS: Performed by: STUDENT IN AN ORGANIZED HEALTH CARE EDUCATION/TRAINING PROGRAM

## 2022-08-29 PROCEDURE — 99232 SBSQ HOSP IP/OBS MODERATE 35: CPT | Performed by: INTERNAL MEDICINE

## 2022-08-29 PROCEDURE — 84100 ASSAY OF PHOSPHORUS: CPT

## 2022-08-29 PROCEDURE — 85610 PROTHROMBIN TIME: CPT

## 2022-08-29 PROCEDURE — 2580000003 HC RX 258: Performed by: NURSE PRACTITIONER

## 2022-08-29 PROCEDURE — 2060000000 HC ICU INTERMEDIATE R&B

## 2022-08-29 PROCEDURE — 80053 COMPREHEN METABOLIC PANEL: CPT

## 2022-08-29 PROCEDURE — 6370000000 HC RX 637 (ALT 250 FOR IP): Performed by: PHYSICIAN ASSISTANT

## 2022-08-29 PROCEDURE — 2580000003 HC RX 258: Performed by: ORTHOPAEDIC SURGERY

## 2022-08-29 PROCEDURE — 90935 HEMODIALYSIS ONE EVALUATION: CPT

## 2022-08-29 RX ADMIN — Medication 10 ML: at 21:08

## 2022-08-29 RX ADMIN — DOCUSATE SODIUM 50 MG AND SENNOSIDES 8.6 MG 2 TABLET: 8.6; 5 TABLET, FILM COATED ORAL at 12:38

## 2022-08-29 RX ADMIN — MORPHINE SULFATE 4 MG: 4 INJECTION, SOLUTION INTRAMUSCULAR; INTRAVENOUS at 00:40

## 2022-08-29 RX ADMIN — CARVEDILOL 12.5 MG: 6.25 TABLET, FILM COATED ORAL at 21:09

## 2022-08-29 RX ADMIN — MORPHINE SULFATE 4 MG: 4 INJECTION, SOLUTION INTRAMUSCULAR; INTRAVENOUS at 09:40

## 2022-08-29 RX ADMIN — MORPHINE SULFATE 4 MG: 4 INJECTION, SOLUTION INTRAMUSCULAR; INTRAVENOUS at 17:33

## 2022-08-29 RX ADMIN — CARVEDILOL 12.5 MG: 6.25 TABLET, FILM COATED ORAL at 12:38

## 2022-08-29 RX ADMIN — Medication 10 ML: at 21:09

## 2022-08-29 RX ADMIN — MORPHINE SULFATE 4 MG: 4 INJECTION, SOLUTION INTRAMUSCULAR; INTRAVENOUS at 21:54

## 2022-08-29 RX ADMIN — MORPHINE SULFATE 4 MG: 4 INJECTION, SOLUTION INTRAMUSCULAR; INTRAVENOUS at 14:03

## 2022-08-29 RX ADMIN — AMLODIPINE BESYLATE 5 MG: 5 TABLET ORAL at 12:38

## 2022-08-29 RX ADMIN — MORPHINE SULFATE 4 MG: 4 INJECTION, SOLUTION INTRAMUSCULAR; INTRAVENOUS at 05:01

## 2022-08-29 RX ADMIN — Medication 10 ML: at 12:38

## 2022-08-29 RX ADMIN — BUMETANIDE 2 MG: 1 TABLET ORAL at 12:38

## 2022-08-29 RX ADMIN — Medication 10 ML: at 09:40

## 2022-08-29 ASSESSMENT — PAIN DESCRIPTION - LOCATION
LOCATION: BUTTOCKS;HIP;LEG
LOCATION: ARM;LEG
LOCATION: ARM;LEG
LOCATION: BUTTOCKS;HIP

## 2022-08-29 ASSESSMENT — PAIN DESCRIPTION - DESCRIPTORS
DESCRIPTORS: ACHING
DESCRIPTORS: CRAMPING;SQUEEZING;DISCOMFORT
DESCRIPTORS: CRAMPING;SQUEEZING;DISCOMFORT

## 2022-08-29 ASSESSMENT — PAIN SCALES - GENERAL
PAINLEVEL_OUTOF10: 10
PAINLEVEL_OUTOF10: 4
PAINLEVEL_OUTOF10: 10

## 2022-08-29 ASSESSMENT — PAIN DESCRIPTION - ORIENTATION
ORIENTATION: LEFT

## 2022-08-29 ASSESSMENT — PAIN DESCRIPTION - PAIN TYPE: TYPE: ACUTE PAIN

## 2022-08-29 ASSESSMENT — PAIN DESCRIPTION - ONSET: ONSET: ON-GOING

## 2022-08-29 ASSESSMENT — PAIN - FUNCTIONAL ASSESSMENT: PAIN_FUNCTIONAL_ASSESSMENT: PREVENTS OR INTERFERES SOME ACTIVE ACTIVITIES AND ADLS

## 2022-08-29 ASSESSMENT — PAIN DESCRIPTION - FREQUENCY: FREQUENCY: CONTINUOUS

## 2022-08-29 NOTE — PROGRESS NOTES
Left message for orthopedic office to notify of new consult. Progress Note - General Surgery   Raymundo Trinidad 42 y o  male MRN: [id number]  Unit/Bed#: [facility] 805-01 Encounter: [id number]    Assessment:  42 y o  male w/acute cholecystitis s/p laparoscopic converted to open subtotal fenestrating cholecystectomy 11/8    Plan:  - regular diet  - d/c IVF  - prn pain control  - cipro/flagyl 5d total  - ALESSIA to suction, monitor output  - d/c mac  - PT/OT  - SQH/SCDs    Subjective/Objective   Subjective: doing well, pain improved and tolerating clears with no nausea no vomiting  Denies flatus or bm    Objective:    Blood pressure 131/76, pulse 95, temperature 98 8 °F (37 1 °C), temperature source Oral, resp  rate 18, height 6' 7" (2 007 m), weight 116 kg (255 lb 11 7 oz), SpO2 96 %  ,Body mass index is 28 81 kg/m²  I/O last 24 hours:   In: 6468 3 [I V :5518 3; IV Piggyback:950]  Out: 6553 [MAUUX:0476; Drains:395; Blood:200]    Invasive Devices     Peripheral Intravenous Line            Peripheral IV 11/07/17 Left Forearm 1 day    Peripheral IV 11/07/17 Right Forearm 1 day          Drain            Closed/Suction Drain RUQ Bulb 19 Fr  less than 1 day    Urethral Catheter Latex 16 Fr  less than 1 day                Physical Exam:   NAD  RRR  Norm resp effort  Abd soft, appropriately tender, ND, dressing cdi with bulb suction serosanguineous output    Lab, Imaging and other studies:  Lab Results   Component Value Date    WBC 17 89 (H) 11/09/2017    HGB 12 7 11/09/2017    HCT 36 5 11/09/2017    MCV 86 11/09/2017     11/09/2017      Lab Results   Component Value Date    GLUCOSE 134 11/09/2017    CALCIUM 8 6 11/09/2017     11/09/2017    K 3 8 11/09/2017    CO2 26 11/09/2017     11/09/2017    BUN 11 11/09/2017    CREATININE 0 83 11/09/2017       VTE Pharmacologic Prophylaxis: Heparin  VTE Mechanical Prophylaxis: sequential compression device

## 2022-08-29 NOTE — CARE COORDINATION
8/29/2022  Social Work Discharge Planning:Dial. today and tunnel cath tomorrow. Awaiting therapy evals. [facility] is following. Notify LETHA [phone] when Pt discharges. CALI and transport form are completed. Electronically signed by WATSON Telles on 8/29/2022 at 9:57 AM

## 2022-08-29 NOTE — PLAN OF CARE
Problem: Pain  Goal: Verbalizes/displays adequate comfort level or baseline comfort level  Outcome: Progressing     Problem: Safety - Adult  Goal: Free from fall injury  Outcome: Progressing     Problem: Musculoskeletal - Adult  Goal: Return mobility to safest level of function  Outcome: Progressing

## 2022-08-29 NOTE — PROGRESS NOTES
[facility] Progress Note    Admitting Date and Time: 8/17/2022  9:25 AM  Admit Dx: Hyperkalemia [E87.5]  Shock liver [K72.00]  Acute kidney injury ([address] [N17.9]  Fall, initial encounter [W19. XXXA]  Traumatic rhabdomyolysis, initial encounter ([facility] Juan Labor. 6XXA]  Leukocytosis, unspecified type [D72.829]    Subjective:  Patient is being followed for Hyperkalemia [E87.5]  Shock liver [K72.00]  Acute kidney injury ([facility] [N17.9]  Fall, initial encounter [W19. XXXA]  Traumatic rhabdom[facility]olysis, [facility] ([facility] Juan Labor. 6XXA]  Leukocytosis, unspecified type [D72.829]     Patient seen in HD. He reports his buttock continues to feel tight. States he is trying to walk but scrotal swelling continues to irritate him. ROS: denies fever, chills, cp, sob, n/v, HA unless stated above.       carvedilol  12.5 mg Oral BID    amLODIPine  5 mg Oral Daily    sennosides-docusate sodium  2 tablet Oral BID    sodium chloride flush  5-40 mL IntraVENous 2 times per day    morphine  4 mg IntraVENous Once    bumetanide  2 mg Oral Daily    sodium chloride flush  5-40 mL IntraVENous 2 times per day     labetalol, 10 mg, Q4H PRN  heparin (porcine), 2,200 Units, PRN  sodium chloride flush, 5-40 mL, PRN  sodium chloride, , PRN  morphine, 4 mg, Q4H PRN  heparin flush, 100 Units, PRN  glucose, 4 tablet, PRN  dextrose bolus, 125 mL, PRN   Or  dextrose bolus, 250 mL, PRN  glucagon (rDNA), 1 mg, PRN  dextrose, , Continuous PRN  sodium chloride flush, 5-40 mL, PRN  sodium chloride, , PRN  polyethylene glycol, 17 g, Daily PRN       Objective:    BP (!) 183/99   Pulse (!) 105   Temp 98.6 °F (37 °C) (Oral)   Resp 18   Ht 5' 10\" (1.778 m)   Wt 199 lb 4.7 oz (90.4 kg)   SpO2 97%   BMI 28.60 kg/m²   General Appearance: alert and oriented to person, place and time and in no acute distress  Skin: warm and dry  Head: normocephalic and atraumatic  Eyes: pupils equal, round, and reactive to light, extraocular eye movements intact, conjunctivae normal  Neck: neck supple and non tender without mass   Pulmonary/Chest: clear to auscultation bilaterally- no wheezes, rales or rhonchi, normal air movement, no respiratory distress  Cardiovascular: normal rate, normal S1 and S2 and no carotid bruits  Abdomen: soft, non-tender, non-distended, normal bowel sounds, no masses or organomegaly  Extremities: no cyanosis, no clubbing and edema of bilateral lower extremities, edema of left upper extremity and hand, non-tender lower extremities   Neurologic: no cranial nerve deficit and speech normal        Recent Labs     08/27/22  0550 08/28/22  0400    136   K 4.9 4.9   CL 98 100   CO2 26 29   BUN 37* 28*   CREATININE 6.9* 5.4*   GLUCOSE 109* 102*   CALCIUM 7.9* 7.8*         Recent Labs     08/27/22  0550 08/28/22  0400 08/29/22  0620   WBC 13.1* 11.4 11.8*   RBC 2.70* 2.54* 2.39*   HGB 8.2* 7.5* 7.1*   HCT 24.0* 22.6* 21.2*   MCV 88.9 89.0 88.7   MCH 30.4 29.5 29.7   MCHC 34.2 33.2 33.5   RDW 15.2* 15.3* 15.3*    214 262   MPV 10.5 10.2 9.9         Radiology: None    Assessment:    Principal Problem:    Hyperkalemia  Active Problems:    Acute kidney injury ([address]    Fall    IV infiltrate, initial encounter    Leukocytosis    Traumatic rhabdomyolysis (HCC)    Elevated LFTs  Resolved Problems:    * No resolved hospital problems. *      Plan:  1. Rhabdomyolysis: S/p fall at home. Unknown down time, but was seen well 24 hours prior to admission. Presented with severe left leg and arm pain. CK initially >350,000 now down to 11,468>6,953>2997. Developed anasarca and worsening renal failure after IVF resuscitation. 2. HELGA: Felt secondary to rhabdomyolysis. Treated initially with IVF, but patient developed anasarca and concern for compartment syndrome. Received IV Bumex. Renal function worsened, with peak at 12.8. Temporary HD cath inserted and HD started on 8/22. Received daily HD, held on 8/25 then Cr worsened to 9.1.  HD restarted with improvement of Cr. Avoid nephrotoxic agents. Nephrology following. Plans for tunneled cath tomorrow. Vascular consulted. 3. Transaminitis: Due to rhabdo. Tylenol was unremarkable. Abdominal imagining negative. UDS positive for cocaine and fentanyl. Avoid heptotoxic agents. GI consulted. No immediate plans for intervention. Should follow up in the office 2-3 weeks after discharge. 4. Hyperkalemia: Resolved. K on admission 5.6. Ca+ gluconate, HCO3, Regular Insulin and lokelma given in ED. EKG in ED showing no evidence of acute ischemic injury, but peaked T waves in multiple leads. Today 4.3. Monitor. Nephrology following. 5. Drug Use: Admitted to taking \"pills\" on admission. UDS positive for cocaine and fentanyl. Encourage cessation. 6. Elevated glucose: No known DM. On admission glucose was 487. Now in the 100s. Hemoglobin a1c 5.9. Monitor. 7. Left thigh pain: Concern for compartment syndrome. Orthopedics x2 consulted. Prophylactic fasciotomy considered but patient and family declining at this time. Encourage mobilization. PT/OT. 8. Hypervolemic hyponatremia: Resolved. Managed with volume removal. On Bumex and HD. 9. Leukocytosis: Resolved. ID consulted. Believe this is associated to rhabdo, and pain. Antibiotics not warranted. 10. Hypertension:  Continue Coreg. Monitor and adjust prn. Dispo: Has been accepted at [facility]. NOTE: This report was transcribed using voice recognition software. Every effort was made to ensure accuracy; however, inadvertent computerized transcription errors may be present. Electronically signed by Leo Perez PA-C on 8/29/2022 at 8:20 AM    20 minutes time spent reviewing patient chart, assessing patient, discussing plan of care with patient and family, discussing plan of care with collaborating physician, and charting.     HOSPITALIST ATTENDING PHYSICIAN NOTE 8/29/2022 1915PM:    Details of the evaluation - subjective assessment (including medication profile, past medical, family and social history when applicable), examination, review of lab and test data, diagnostic impressions and medical decision making - performed by Galina Lynne PA-C, were discussed with me on the date of service and I agree with clinical information herein unless otherwise noted. The patient has been evaluated by me personally earlier today. Pt reports no fevers, chills,n/v.     Exam: heart reg at rate of 100,lungs cta, abd pos bs soft nt, ext neg for le edema    I agree with the assessment and plan of Daphnie Echevarria PA-C    Rhabdomyolysis  Manjinder  Hyperkalemia  Elevated lfts  Hyperglycemia  hyponatremia  Leukocytosis            Electronically signed by Bertram Vanegas D.O.   Hospitalist  4M Hospitalist Service at [facility]

## 2022-08-29 NOTE — PROGRESS NOTES
Occupational Therapy    OT eval and treat orders receive and chart reviewed. Attempt made but pt off unit for dialysis. Will check back at another time/date as able/appropriated.     Laney Dominguez, OTR/L

## 2022-08-30 ENCOUNTER — APPOINTMENT (OUTPATIENT)
Dept: GENERAL RADIOLOGY | Age: 37
DRG: 351 | End: 2022-08-30
Payer: MEDICAID

## 2022-08-30 LAB
ALBUMIN SERPL-MCNC: 2.9 G/DL (ref 3.5–5.2)
ALP BLD-CCNC: 51 U/L (ref 40–129)
ALT SERPL-CCNC: 97 U/L (ref 0–40)
ANION GAP SERPL CALCULATED.3IONS-SCNC: 10 MMOL/L (ref 7–16)
ANION GAP SERPL CALCULATED.3IONS-SCNC: 9 MMOL/L (ref 7–16)
ANISOCYTOSIS: ABNORMAL
APTT: 23.4 SEC (ref 24.5–35.1)
AST SERPL-CCNC: 45 U/L (ref 0–39)
BASOPHILS ABSOLUTE: 0.05 E9/L (ref 0–0.2)
BASOPHILS RELATIVE PERCENT: 0.4 % (ref 0–2)
BILIRUB SERPL-MCNC: 0.4 MG/DL (ref 0–1.2)
BUN BLDV-MCNC: 24 MG/DL (ref 6–20)
BUN BLDV-MCNC: 24 MG/DL (ref 6–20)
CALCIUM SERPL-MCNC: 8.2 MG/DL (ref 8.6–10.2)
CALCIUM SERPL-MCNC: 8.2 MG/DL (ref 8.6–10.2)
CHLORIDE BLD-SCNC: 100 MMOL/L (ref 98–107)
CHLORIDE BLD-SCNC: 101 MMOL/L (ref 98–107)
CO2: 26 MMOL/L (ref 22–29)
CO2: 27 MMOL/L (ref 22–29)
CREAT SERPL-MCNC: 4.4 MG/DL (ref 0.7–1.2)
CREAT SERPL-MCNC: 4.4 MG/DL (ref 0.7–1.2)
EOSINOPHILS ABSOLUTE: 0.35 E9/L (ref 0.05–0.5)
EOSINOPHILS RELATIVE PERCENT: 3 % (ref 0–6)
GFR AFRICAN AMERICAN: 18
GFR AFRICAN AMERICAN: 18
GFR NON-AFRICAN AMERICAN: 18 ML/MIN/1.73
GFR NON-AFRICAN AMERICAN: 18 ML/MIN/1.73
GLUCOSE BLD-MCNC: 110 MG/DL (ref 74–99)
GLUCOSE BLD-MCNC: 118 MG/DL (ref 74–99)
HCT VFR BLD CALC: 21.4 % (ref 37–54)
HEMOGLOBIN: 7 G/DL (ref 12.5–16.5)
IMMATURE GRANULOCYTES #: 0.06 E9/L
IMMATURE GRANULOCYTES %: 0.5 % (ref 0–5)
INR BLD: 1.1
LYMPHOCYTES ABSOLUTE: 2.56 E9/L (ref 1.5–4)
LYMPHOCYTES RELATIVE PERCENT: 21.6 % (ref 20–42)
MAGNESIUM: 1.8 MG/DL (ref 1.6–2.6)
MCH RBC QN AUTO: 30.6 PG (ref 26–35)
MCHC RBC AUTO-ENTMCNC: 32.7 % (ref 32–34.5)
MCV RBC AUTO: 93.4 FL (ref 80–99.9)
MONOCYTES ABSOLUTE: 0.92 E9/L (ref 0.1–0.95)
MONOCYTES RELATIVE PERCENT: 7.8 % (ref 2–12)
NEUTROPHILS ABSOLUTE: 7.9 E9/L (ref 1.8–7.3)
NEUTROPHILS RELATIVE PERCENT: 66.7 % (ref 43–80)
OVALOCYTES: ABNORMAL
PDW BLD-RTO: 15.4 FL (ref 11.5–15)
PHOSPHORUS: 4.2 MG/DL (ref 2.5–4.5)
PLATELET # BLD: 291 E9/L (ref 130–450)
PMV BLD AUTO: 9.7 FL (ref 7–12)
POIKILOCYTES: ABNORMAL
POLYCHROMASIA: ABNORMAL
POTASSIUM SERPL-SCNC: 4.5 MMOL/L (ref 3.5–5)
POTASSIUM SERPL-SCNC: 4.6 MMOL/L (ref 3.5–5)
PROTHROMBIN TIME: 11.7 SEC (ref 9.3–12.4)
RBC # BLD: 2.29 E12/L (ref 3.8–5.8)
SODIUM BLD-SCNC: 136 MMOL/L (ref 132–146)
SODIUM BLD-SCNC: 137 MMOL/L (ref 132–146)
TOTAL CK: 1238 U/L (ref 20–200)
TOTAL PROTEIN: 5.4 G/DL (ref 6.4–8.3)
WBC # BLD: 11.8 E9/L (ref 4.5–11.5)

## 2022-08-30 PROCEDURE — 2580000003 HC RX 258: Performed by: NURSE PRACTITIONER

## 2022-08-30 PROCEDURE — 85730 THROMBOPLASTIN TIME PARTIAL: CPT

## 2022-08-30 PROCEDURE — 6360000002 HC RX W HCPCS: Performed by: STUDENT IN AN ORGANIZED HEALTH CARE EDUCATION/TRAINING PROGRAM

## 2022-08-30 PROCEDURE — 2060000000 HC ICU INTERMEDIATE R&B

## 2022-08-30 PROCEDURE — 2580000003 HC RX 258: Performed by: ORTHOPAEDIC SURGERY

## 2022-08-30 PROCEDURE — 80048 BASIC METABOLIC PNL TOTAL CA: CPT

## 2022-08-30 PROCEDURE — 85610 PROTHROMBIN TIME: CPT

## 2022-08-30 PROCEDURE — 36415 COLL VENOUS BLD VENIPUNCTURE: CPT

## 2022-08-30 PROCEDURE — 6370000000 HC RX 637 (ALT 250 FOR IP): Performed by: INTERNAL MEDICINE

## 2022-08-30 PROCEDURE — 85025 COMPLETE CBC W/AUTO DIFF WBC: CPT

## 2022-08-30 PROCEDURE — 84100 ASSAY OF PHOSPHORUS: CPT

## 2022-08-30 PROCEDURE — 99232 SBSQ HOSP IP/OBS MODERATE 35: CPT | Performed by: INTERNAL MEDICINE

## 2022-08-30 PROCEDURE — 83735 ASSAY OF MAGNESIUM: CPT

## 2022-08-30 PROCEDURE — 80053 COMPREHEN METABOLIC PANEL: CPT

## 2022-08-30 PROCEDURE — 82550 ASSAY OF CK (CPK): CPT

## 2022-08-30 PROCEDURE — 6370000000 HC RX 637 (ALT 250 FOR IP): Performed by: PHYSICIAN ASSISTANT

## 2022-08-30 RX ORDER — BUMETANIDE 1 MG/1
2 TABLET ORAL 2 TIMES DAILY
Status: DISCONTINUED | OUTPATIENT
Start: 2022-08-30 | End: 2022-09-02 | Stop reason: HOSPADM

## 2022-08-30 RX ADMIN — DOCUSATE SODIUM 50 MG AND SENNOSIDES 8.6 MG 2 TABLET: 8.6; 5 TABLET, FILM COATED ORAL at 20:12

## 2022-08-30 RX ADMIN — MORPHINE SULFATE 4 MG: 4 INJECTION, SOLUTION INTRAMUSCULAR; INTRAVENOUS at 06:34

## 2022-08-30 RX ADMIN — AMLODIPINE BESYLATE 5 MG: 5 TABLET ORAL at 10:07

## 2022-08-30 RX ADMIN — DOCUSATE SODIUM 50 MG AND SENNOSIDES 8.6 MG 2 TABLET: 8.6; 5 TABLET, FILM COATED ORAL at 10:05

## 2022-08-30 RX ADMIN — MORPHINE SULFATE 4 MG: 4 INJECTION, SOLUTION INTRAMUSCULAR; INTRAVENOUS at 02:22

## 2022-08-30 RX ADMIN — MORPHINE SULFATE 4 MG: 4 INJECTION, SOLUTION INTRAMUSCULAR; INTRAVENOUS at 20:06

## 2022-08-30 RX ADMIN — Medication 10 ML: at 20:07

## 2022-08-30 RX ADMIN — MORPHINE SULFATE 4 MG: 4 INJECTION, SOLUTION INTRAMUSCULAR; INTRAVENOUS at 16:13

## 2022-08-30 RX ADMIN — Medication 10 ML: at 10:07

## 2022-08-30 RX ADMIN — BUMETANIDE 2 MG: 1 TABLET ORAL at 10:05

## 2022-08-30 RX ADMIN — BUMETANIDE 2 MG: 1 TABLET ORAL at 23:01

## 2022-08-30 RX ADMIN — CARVEDILOL 12.5 MG: 6.25 TABLET, FILM COATED ORAL at 20:06

## 2022-08-30 RX ADMIN — MORPHINE SULFATE 4 MG: 4 INJECTION, SOLUTION INTRAMUSCULAR; INTRAVENOUS at 10:44

## 2022-08-30 RX ADMIN — CARVEDILOL 12.5 MG: 6.25 TABLET, FILM COATED ORAL at 10:05

## 2022-08-30 RX ADMIN — Medication 10 ML: at 10:06

## 2022-08-30 RX ADMIN — Medication 10 ML: at 10:05

## 2022-08-30 ASSESSMENT — PAIN DESCRIPTION - FREQUENCY
FREQUENCY: CONTINUOUS
FREQUENCY: CONTINUOUS

## 2022-08-30 ASSESSMENT — PAIN SCALES - GENERAL
PAINLEVEL_OUTOF10: 7
PAINLEVEL_OUTOF10: 10
PAINLEVEL_OUTOF10: 10
PAINLEVEL_OUTOF10: 8
PAINLEVEL_OUTOF10: 10

## 2022-08-30 ASSESSMENT — PAIN DESCRIPTION - ONSET
ONSET: ON-GOING
ONSET: ON-GOING

## 2022-08-30 ASSESSMENT — PAIN DESCRIPTION - PAIN TYPE
TYPE: ACUTE PAIN
TYPE: ACUTE PAIN

## 2022-08-30 ASSESSMENT — PAIN DESCRIPTION - ORIENTATION
ORIENTATION: LEFT

## 2022-08-30 ASSESSMENT — PAIN DESCRIPTION - DESCRIPTORS
DESCRIPTORS: CRAMPING;DISCOMFORT;SHARP
DESCRIPTORS: CRAMPING;SQUEEZING
DESCRIPTORS: ACHING;DISCOMFORT
DESCRIPTORS: CRAMPING;DISCOMFORT

## 2022-08-30 ASSESSMENT — PAIN DESCRIPTION - LOCATION
LOCATION: LEG;HIP;BUTTOCKS
LOCATION: LEG
LOCATION: HIP
LOCATION: BUTTOCKS;LEG;HIP

## 2022-08-30 ASSESSMENT — PAIN - FUNCTIONAL ASSESSMENT
PAIN_FUNCTIONAL_ASSESSMENT: PREVENTS OR INTERFERES SOME ACTIVE ACTIVITIES AND ADLS
PAIN_FUNCTIONAL_ASSESSMENT: ACTIVITIES ARE NOT PREVENTED
PAIN_FUNCTIONAL_ASSESSMENT: PREVENTS OR INTERFERES SOME ACTIVE ACTIVITIES AND ADLS

## 2022-08-30 NOTE — PROGRESS NOTES
Comprehensive Nutrition Assessment    Type and Reason for Visit:  Reassess    Nutrition Recommendations/Plan:   Consider add Low Phos to current diet or phosphate binders with meals d/t hyperphosphatemia  Continue current ONS, as tolerated     Malnutrition Assessment:  Malnutrition Status: At risk for malnutrition (Comment) (08/24/22 1152)    Context:  Acute Illness     Findings of the 6 clinical characteristics of malnutrition:  Energy Intake:  50% or less of estimated energy requirements for 5 or more days  Weight Loss:  No significant weight loss     Body Fat Loss:  No significant body fat loss     Muscle Mass Loss:  No significant muscle mass loss    Fluid Accumulation:  Unable to assess (multiple factors contributing to hypervolemia) Extremities   Strength:  Not Performed    Nutrition Assessment:    Pt continues on HD. Was supposed to have [facility] today but on hold at this time. Discharge planning in progress to LTAC. Pt currently on Regular diet. Recommend consider Low Phosphorus added to diet or phosphate binders with meals. Will continue inpatient monitoring    Nutrition Related Findings:    soft abd +BS, -I/O 11L, +1-+2 gen edema, A&Ox4, K+ WNL Wound Type: Surgical Incision (temp HD cath, abrasions from LOC/fall PTA)       Current Nutrition Intake & Therapies:    Average Meal Intake: 51-75%, 26-50%  Average Supplements Intake: Unable to assess  ADULT DIET; Regular  ADULT ORAL NUTRITION SUPPLEMENT; Lunch, Dinner; Standard High Calorie/High Protein Oral Supplement    Anthropometric Measures:  Height: 5' 10\" (177.8 cm)  Ideal Body Weight (IBW): 166 lbs (75 kg)    Admission Body Weight: 189 lb 11.2 oz (86 kg) (8/19)  Current Body Weight: 199 lb 4.7 oz (90.4 kg), 122.7 % IBW. [facility]  Current BMI (kg/m2): 28.6  Usual Body Weight: 177 lb 9.6 oz (80.6 kg) (at [facility] x 1 mo)  % Weight Change (Calculated): 14.7                    BMI Categories: Overweight (BMI 25.0-29. 9)    Estimated Daily Nutrient Needs:  Energy Requirements Based On: Formula ([place])  Weight Used for Energy Requirements: Admission  Energy (kcal/day):   Weight Used for Protein Requirements: Ideal  Protein (g/day): 100-110 (1.2-1.4 g/kg as saundra )  Method Used for Fluid Requirements: Standard Renal  Fluid (ml/day):  per Renal management    Nutrition Diagnosis:   Increased nutrient needs related to renal dysfunction as evidenced by intake 26-50%, dialysis    Nutrition Interventions:   Food and/or Nutrient Delivery: Continue Current Diet, Continue Oral Nutrition Supplement  Nutrition Education/Counseling: No recommendation at this time  Coordination of Nutrition Care: Continue to monitor while inpatient       Goals:  Previous Goal Met: Progressing toward Goal(s)  Goals: PO intake 75% or greater       Nutrition Monitoring and Evaluation:   Behavioral-Environmental Outcomes: None Identified  Food/Nutrient Intake Outcomes: Food and Nutrient Intake, Supplement Intake  Physical Signs/Symptoms Outcomes: Biochemical Data, GI Status, Fluid Status or Edema, Nutrition Focused Physical Findings, Hemodynamic Status, Skin, Weight    Discharge Planning:    Continue Oral Nutrition Supplement     Rosemary Sue RD, [address] , [place]  Contact: [phone]

## 2022-08-30 NOTE — CARE COORDINATION
8/30/2022  Social Work Discharge Planning:Pt will discharge to [facility]. Per liaison, they can accept Pt today and then bring him back to get his tunnel cath if physician is agreeable. SW notified nurse. Notify LETHA dial-[phone] when Pt discharges. CALI and transport form are completed.  Electronically signed by WATSON Cochran on 8/30/2022 at 9:48 AM

## 2022-08-30 NOTE — FLOWSHEET NOTE
Patient brought down to radiology for tunneled hemodialysis catheter. After discussing procedure with Dr. Gary Angulo, patient decided he did not want to proceed with procedure at this time. Will reschedule for Friday if patient agreeable.

## 2022-08-30 NOTE — PROGRESS NOTES
[facility] Progress Note    Admitting Date and Time: 8/17/2022  9:25 AM  Admit Dx: Hyperkalemia [E87.5]  Shock liver [K72.00]  Acute kidney injury ([facility] [N17.9]  Fall, initial encounter [W19. XXXA]  [facility] Janette Joao. 6XXA]  Leukocytosis, unspecified type [D72.829]    Subjective:  Patient is being followed for Hyperkalemia [E87.5]  Shock liver [K72.00]  Acute kidney injury ([facility] [N17.9]  Fall, initial encounter [W19. XXXA]  Traumatic rhabdomyolysis, initial encounter ([facility] Janette Joao. 6XXA]  Leukocytosis, unspecified type [D72.829]     Patient sitting on edge of bed in no acute distress. Reports improvement of his leg and buttock pain. States he is having cramping in his left thigh. States he has tried stretching and walking with no improvement. States the warm blankets at dialysis helped and is requesting a heating pad. States he did not have HD cath placed this am due to his mother not being present. ROS: denies fever, chills, cp, sob, n/v, HA unless stated above.       ceFAZolin  2,000 mg IntraVENous Once    carvedilol  12.5 mg Oral BID    amLODIPine  5 mg Oral Daily    sennosides-docusate sodium  2 tablet Oral BID    sodium chloride flush  5-40 mL IntraVENous 2 times per day    morphine  4 mg IntraVENous Once    bumetanide  2 mg Oral Daily    sodium chloride flush  5-40 mL IntraVENous 2 times per day     labetalol, 10 mg, Q4H PRN  heparin (porcine), 2,200 Units, PRN  sodium chloride flush, 5-40 mL, PRN  sodium chloride, , PRN  morphine, 4 mg, Q4H PRN  heparin flush, 100 Units, PRN  glucose, 4 tablet, PRN  dextrose bolus, 125 mL, PRN   Or  dextrose bolus, 250 mL, PRN  glucagon (rDNA), 1 mg, PRN  dextrose, , Continuous PRN  sodium chloride flush, 5-40 mL, PRN  sodium chloride, , PRN  polyethylene glycol, 17 g, Daily PRN       Objective:    BP (!) 154/83   Pulse (!) 102   Temp 98.5 °F (36.9 °C) (Oral)   Resp 18   Ht 5' 10\" (1.778 m)   Wt 199 lb 4.7 oz (90.4 kg)   SpO2 100%   BMI 28.60 kg/m²   General Appearance: alert and oriented to person, place and time and in no acute distress  Skin: warm and dry  Head: normocephalic and atraumatic  Eyes: pupils equal, round, and reactive to light, extraocular eye movements intact, conjunctivae normal  Neck: neck supple and non tender without mass   Pulmonary/Chest: clear to auscultation bilaterally- no wheezes, rales or rhonchi, normal air movement, no respiratory distress  Cardiovascular: normal rate, normal S1 and S2 and no carotid bruits  Abdomen: soft, non-tender, non-distended, normal bowel sounds, no masses or organomegaly  Extremities: no cyanosis, no clubbing and no edema    Neurologic: no cranial nerve deficit and speech normal        Recent Labs     08/28/22  0400 08/29/22  0620 08/30/22  0235    136 136  137   K 4.9 4.8 4.5  4.6    99 100  101   CO2 29 28 26  27   BUN 28* 38* 24*  24*   CREATININE 5.4* 6.4* 4.4*  4.4*   GLUCOSE 102* 105* 110*  118*   CALCIUM 7.8* 8.1* 8.2*  8.2*         Recent Labs     08/28/22  0400 08/29/22  0620 08/30/22  0235   WBC 11.4 11.8* 11.8*   RBC 2.54* 2.39* 2.29*   HGB 7.5* 7.1* 7.0*   HCT 22.6* 21.2* 21.4*   MCV 89.0 88.7 93.4   MCH 29.5 29.7 30.6   MCHC 33.2 33.5 32.7   RDW 15.3* 15.3* 15.4*    262 291   MPV 10.2 9.9 9.7         Radiology: None    Assessment:    Principal Problem:    Hyperkalemia  Active Problems:    Acute kidney injury ([address]    Fall    IV infiltrate, initial encounter    Leukocytosis    Traumatic rhabdomyolysis (HCC)    Elevated LFTs  Resolved Problems:    * No resolved hospital problems. *      Plan:  1. Rhabdomyolysis: S/p fall at home. Unknown down time, but was seen well 24 hours prior to admission. Presented with severe left leg and arm pain. CK initially >350,000 now down to 11,468>6,953>2997>1238. Developed anasarca and worsening renal failure after IVF resuscitation. 2. HELGA: Felt secondary to rhabdomyolysis.  Treated initially with IVF, but patient developed anasarca and concern for compartment syndrome. Received IV Bumex. Renal function worsened, with peak at 12.8. Temporary HD cath inserted and HD started on 8/22. Received daily HD, held on 8/25 then Cr worsened to 9.1. HD restarted with improvement of Cr. Avoid nephrotoxic agents. Nephrology following. Plans for tunneled cath on Friday if patient is agreeable. Vascular consulted. 3. Transaminitis: Due to rhabdo. Tylenol was unremarkable. Abdominal imagining negative. UDS positive for cocaine and fentanyl. Avoid heptotoxic agents. GI consulted. No immediate plans for intervention. Should follow up in the office 2-3 weeks after discharge. 4. Hyperkalemia: Resolved. K on admission 5.6. Ca+ gluconate, HCO3, Regular Insulin and lokelma given in ED. EKG in ED showing no evidence of acute ischemic injury, but peaked T waves in multiple leads. Today 4.3. Monitor. Nephrology following. 5. Drug Use: Admitted to taking \"pills\" on admission. UDS positive for cocaine and fentanyl. Encourage cessation. 6. Elevated glucose: No known DM. On admission glucose was 487. Now in the 100s. Hemoglobin a1c 5.9. Monitor. 7. Left thigh pain: Concern for compartment syndrome. Orthopedics x2 consulted. Prophylactic fasciotomy considered but patient and family declining at this time. Encourage mobilization. PT/OT. 8. Hypervolemic hyponatremia: Resolved. Managed with volume removal. On Bumex and HD. 9. Leukocytosis: ID consulted. Believe this is associated to rhabdo, and pain. Antibiotics not warranted. 10. Hypertension: Continue Coreg. Monitor and adjust prn. Dispo: Has been accepted at [facility]. NOTE: This report was transcribed using voice recognition software. Every effort was made to ensure accuracy; however, inadvertent computerized transcription errors may be present.   Electronically signed by Lois Holter, PA-C on 8/30/2022 at 8:34 AM    20 minutes time spent reviewing patient chart, assessing patient, discussing plan of care with patient and family, discussing plan of care with collaborating physician, and charting. HOSPITALIST ATTENDING PHYSICIAN NOTE 8/30/2022 2122PM:    Details of the evaluation - subjective assessment (including medication profile, past medical, family and social history when applicable), examination, review of lab and test data, diagnostic impressions and medical decision making - performed by Janette Ayala PA-C, were discussed with me on the date of service and I agree with clinical information herein unless otherwise noted. The patient has been evaluated by me personally earlier today. Pt reports no fevers, chills,n/v.     Exam: heart reg at rate of 98,lungs cta, abd pos bs soft nt, ext neg for le edema    I agree with the assessment and plan of Daphnie Echevarria PA-C    Rhabdomyolysis  Manjinder  Hyperkalemia  Elevated lfts  Hyperglycemia  hyponatremia  Leukocytosis      Electronically signed by Crescencio Billings D.O.   Hospitalist  4M Hospitalist Service at [facility]

## 2022-08-30 NOTE — PROGRESS NOTES
BID    amLODIPine  5 mg Oral Daily    sennosides-docusate sodium  2 tablet Oral BID    sodium chloride flush  5-40 mL IntraVENous 2 times per day    morphine  4 mg IntraVENous Once    bumetanide  2 mg Oral Daily    sodium chloride flush  5-40 mL IntraVENous 2 times per day       MEDS (infusions):   sodium chloride      dextrose      sodium chloride         MEDS (prn):  labetalol, heparin (porcine), sodium chloride flush, sodium chloride, morphine, heparin flush, glucose, dextrose bolus **OR** dextrose bolus, glucagon (rDNA), dextrose, sodium chloride flush, sodium chloride, polyethylene glycol    PHYSICAL EXAM:     Patient Vitals for the past 24 hrs:   BP Temp Temp src Pulse Resp SpO2   08/29/22 1230 (!) 160/87 98.1 °F (36.7 °C) Oral 87 18 100 %   08/29/22 1200 (!) 167/109 -- -- 77 -- --   08/29/22 1130 (!) 180/99 -- -- 83 -- --   08/29/22 1100 (!) 189/108 -- -- 83 -- --   08/29/22 1030 (!) 166/98 -- -- 85 -- --   08/29/22 1000 (!) 183/111 -- -- 89 -- --   08/29/22 0930 (!) 191/106 -- -- 89 -- --   08/29/22 0900 (!) 182/109 -- -- 94 -- --   08/29/22 0830 (!) 193/124 -- -- (!) 102 -- --   08/29/22 0800 (!) 209/119 -- -- (!) 107 -- --   08/29/22 0745 (!) 202/121 98.1 °F (36.7 °C) -- (!) 109 16 --   08/29/22 0501 -- -- -- -- 18 --   08/29/22 0030 (!) 183/99 -- -- (!) 105 20 --   08/28/22 2034 -- -- -- -- 18 --   @      Intake/Output Summary (Last 24 hours) at 8/29/2022 2021  Last data filed at 8/29/2022 1556  Gross per 24 hour   Intake --   Output 850 ml   Net -850 ml         Wt Readings from Last 3 Encounters:   04/30/18 145 lb (65.8 kg)   12/14/17 155 lb (70.3 kg)   09/14/17 165 lb (74.8 kg)       Constitutional:  in no acute distress  HEENT: NC/AT, EOMI, sclera and conjunctiva are clear and anicteric, mucus membranes moist  Neck: Trachea midline, no JVD  Cardiovascular: S1, S2 regular rhythm, no murmur,or rub  Respiratory:  No crackles, no wheeze  Gastrointestinal:  Soft, nontender, nondistended, NABS  Ext: Left lower extremity edema, mild, though otherwise no edema, feet warm  Skin: dry, no rash  Neuro: awake, alert, interactive. Moves all 4 extremities      DATA:    Recent Labs     08/27/22  0550 08/28/22  0400 08/29/22  0620   WBC 13.1* 11.4 11.8*   HGB 8.2* 7.5* 7.1*   HCT 24.0* 22.6* 21.2*   MCV 88.9 89.0 88.7    214 262     Recent Labs     08/27/22  0550 08/28/22  0400 08/29/22  0620    136 136   K 4.9 4.9 4.8   CL 98 100 99   CO2 26 29 28   MG  --  2.0 1.8   PHOS  --  4.1 4.8*   BUN 37* 28* 38*   CREATININE 6.9* 5.4* 6.4*   ALT  --  125* 104*   AST  --  81* 57*   BILIDIR  --  <0.2  --    BILITOT  --  0.6 0.6   ALKPHOS  --  43 48       Lab Results   Component Value Date    LABPROT 2.0 (H) 08/17/2022    LABPROT 2.0 08/17/2022       Assessment  1. Acute kidney injury, severe, oliguric secondary to rhabdomyolysis, itself severe    2. Rhabdomyolysis, severe, total CK on presentation 398,000, improving to total CK 8/18 morning 155,080. Awaiting reporting of this morning's true total CK (currently just listed as greater than 22,000). Does not appear to have compartment syndrome in either left upper or lower extremity, though both are quite sore, and he complains of tingling. 3.  Hyperkalemia due to HELGA, and rhabdomyolysis. 4.  Hyperphosphatemia, due to rhabdomyolysis, HELGA    5. Transaminitis, improving    CK improving  Hyper volemic and continues to be oligouric   Temp RIJ HD line 8/22   Some improvement in urine output, and rate of rise in creatinine since last dialysis is somewhat less than previously.   Both may represent early signs of recovery    Recommendations  Continue IHD support for solute and volume clearance  Continue to follow closely for signs of recovery  Tunneled dialysis line per IR, probably on Friday (only limited procedure list availability)  Continue Bumex 2 mg po daily   Daily bmp , ck         Electronically signed by Tomas Lynn MD on 8/29/2022

## 2022-08-30 NOTE — CARE COORDINATION
According to RN, Dr Dustin Wellington does not want pt to [facility] until permanent HD cath in place.  Plus continued monitoring of urine output-o

## 2022-08-31 LAB
ANION GAP SERPL CALCULATED.3IONS-SCNC: 9 MMOL/L (ref 7–16)
BUN BLDV-MCNC: 32 MG/DL (ref 6–20)
CALCIUM SERPL-MCNC: 8.8 MG/DL (ref 8.6–10.2)
CHLORIDE BLD-SCNC: 96 MMOL/L (ref 98–107)
CO2: 29 MMOL/L (ref 22–29)
CREAT SERPL-MCNC: 4.6 MG/DL (ref 0.7–1.2)
GFR AFRICAN AMERICAN: 17
GFR NON-AFRICAN AMERICAN: 17 ML/MIN/1.73
GLUCOSE BLD-MCNC: 98 MG/DL (ref 74–99)
HCT VFR BLD CALC: 22.8 % (ref 37–54)
HEMOGLOBIN: 7.3 G/DL (ref 12.5–16.5)
MAGNESIUM: 1.7 MG/DL (ref 1.6–2.6)
MCH RBC QN AUTO: 29.6 PG (ref 26–35)
MCHC RBC AUTO-ENTMCNC: 32 % (ref 32–34.5)
MCV RBC AUTO: 92.3 FL (ref 80–99.9)
PDW BLD-RTO: 14.9 FL (ref 11.5–15)
PHOSPHORUS: 4.6 MG/DL (ref 2.5–4.5)
PLATELET # BLD: 386 E9/L (ref 130–450)
PMV BLD AUTO: 9.4 FL (ref 7–12)
POTASSIUM SERPL-SCNC: 4.6 MMOL/L (ref 3.5–5)
RBC # BLD: 2.47 E12/L (ref 3.8–5.8)
SODIUM BLD-SCNC: 134 MMOL/L (ref 132–146)
TOTAL CK: 965 U/L (ref 20–200)
WBC # BLD: 11.1 E9/L (ref 4.5–11.5)

## 2022-08-31 PROCEDURE — 84100 ASSAY OF PHOSPHORUS: CPT

## 2022-08-31 PROCEDURE — 82550 ASSAY OF CK (CPK): CPT

## 2022-08-31 PROCEDURE — 6370000000 HC RX 637 (ALT 250 FOR IP): Performed by: INTERNAL MEDICINE

## 2022-08-31 PROCEDURE — 6360000002 HC RX W HCPCS: Performed by: STUDENT IN AN ORGANIZED HEALTH CARE EDUCATION/TRAINING PROGRAM

## 2022-08-31 PROCEDURE — 2580000003 HC RX 258: Performed by: ORTHOPAEDIC SURGERY

## 2022-08-31 PROCEDURE — 2060000000 HC ICU INTERMEDIATE R&B

## 2022-08-31 PROCEDURE — 85027 COMPLETE CBC AUTOMATED: CPT

## 2022-08-31 PROCEDURE — 6370000000 HC RX 637 (ALT 250 FOR IP): Performed by: PHYSICIAN ASSISTANT

## 2022-08-31 PROCEDURE — 80048 BASIC METABOLIC PNL TOTAL CA: CPT

## 2022-08-31 PROCEDURE — 83735 ASSAY OF MAGNESIUM: CPT

## 2022-08-31 PROCEDURE — 99232 SBSQ HOSP IP/OBS MODERATE 35: CPT | Performed by: INTERNAL MEDICINE

## 2022-08-31 PROCEDURE — 36415 COLL VENOUS BLD VENIPUNCTURE: CPT

## 2022-08-31 PROCEDURE — 2580000003 HC RX 258: Performed by: NURSE PRACTITIONER

## 2022-08-31 RX ORDER — OXYCODONE HYDROCHLORIDE AND ACETAMINOPHEN 5; 325 MG/1; MG/1
1 TABLET ORAL EVERY 4 HOURS PRN
Status: DISCONTINUED | OUTPATIENT
Start: 2022-08-31 | End: 2022-09-02 | Stop reason: HOSPADM

## 2022-08-31 RX ADMIN — CARVEDILOL 12.5 MG: 6.25 TABLET, FILM COATED ORAL at 19:28

## 2022-08-31 RX ADMIN — MORPHINE SULFATE 4 MG: 4 INJECTION, SOLUTION INTRAMUSCULAR; INTRAVENOUS at 01:14

## 2022-08-31 RX ADMIN — Medication 10 ML: at 09:13

## 2022-08-31 RX ADMIN — Medication 10 ML: at 19:30

## 2022-08-31 RX ADMIN — CARVEDILOL 12.5 MG: 6.25 TABLET, FILM COATED ORAL at 09:13

## 2022-08-31 RX ADMIN — MORPHINE SULFATE 4 MG: 4 INJECTION, SOLUTION INTRAMUSCULAR; INTRAVENOUS at 05:01

## 2022-08-31 RX ADMIN — MORPHINE SULFATE 4 MG: 4 INJECTION, SOLUTION INTRAMUSCULAR; INTRAVENOUS at 14:37

## 2022-08-31 RX ADMIN — BUMETANIDE 2 MG: 1 TABLET ORAL at 09:13

## 2022-08-31 RX ADMIN — MORPHINE SULFATE 4 MG: 4 INJECTION, SOLUTION INTRAMUSCULAR; INTRAVENOUS at 18:37

## 2022-08-31 RX ADMIN — BUMETANIDE 2 MG: 1 TABLET ORAL at 19:28

## 2022-08-31 RX ADMIN — DOCUSATE SODIUM 50 MG AND SENNOSIDES 8.6 MG 2 TABLET: 8.6; 5 TABLET, FILM COATED ORAL at 19:28

## 2022-08-31 RX ADMIN — MORPHINE SULFATE 4 MG: 4 INJECTION, SOLUTION INTRAMUSCULAR; INTRAVENOUS at 09:12

## 2022-08-31 RX ADMIN — MORPHINE SULFATE 4 MG: 4 INJECTION, SOLUTION INTRAMUSCULAR; INTRAVENOUS at 08:20

## 2022-08-31 RX ADMIN — MORPHINE SULFATE 4 MG: 4 INJECTION, SOLUTION INTRAMUSCULAR; INTRAVENOUS at 23:47

## 2022-08-31 RX ADMIN — AMLODIPINE BESYLATE 5 MG: 5 TABLET ORAL at 09:13

## 2022-08-31 ASSESSMENT — PAIN SCALES - GENERAL
PAINLEVEL_OUTOF10: 10
PAINLEVEL_OUTOF10: 6
PAINLEVEL_OUTOF10: 9
PAINLEVEL_OUTOF10: 10
PAINLEVEL_OUTOF10: 7
PAINLEVEL_OUTOF10: 9
PAINLEVEL_OUTOF10: 8
PAINLEVEL_OUTOF10: 9
PAINLEVEL_OUTOF10: 10

## 2022-08-31 ASSESSMENT — PAIN DESCRIPTION - ONSET
ONSET: ON-GOING

## 2022-08-31 ASSESSMENT — PAIN DESCRIPTION - PAIN TYPE
TYPE: ACUTE PAIN

## 2022-08-31 ASSESSMENT — PAIN DESCRIPTION - DESCRIPTORS
DESCRIPTORS: ACHING;CRAMPING;DISCOMFORT
DESCRIPTORS: ACHING;CRAMPING;DISCOMFORT
DESCRIPTORS: CRAMPING;DISCOMFORT
DESCRIPTORS: CRAMPING;DISCOMFORT
DESCRIPTORS: ACHING;CRAMPING;DISCOMFORT
DESCRIPTORS: CRAMPING;DISCOMFORT
DESCRIPTORS: CRAMPING;DISCOMFORT
DESCRIPTORS: ACHING;CRAMPING

## 2022-08-31 ASSESSMENT — PAIN DESCRIPTION - ORIENTATION
ORIENTATION: LEFT

## 2022-08-31 ASSESSMENT — PAIN DESCRIPTION - LOCATION
LOCATION: LEG
LOCATION: LEG;BUTTOCKS;HIP
LOCATION: BUTTOCKS;LEG;HIP
LOCATION: LEG
LOCATION: LEG
LOCATION: LEG;BUTTOCKS;HIP
LOCATION: BUTTOCKS;LEG;HIP
LOCATION: HIP;LEG;BUTTOCKS

## 2022-08-31 ASSESSMENT — PAIN DESCRIPTION - FREQUENCY
FREQUENCY: CONTINUOUS

## 2022-08-31 NOTE — PROGRESS NOTES
Physical Therapy  Facility/Department: [facility] INTERNAL MEDICINE 2      Name: rOquidea Hanna  : 1985  MRN: [id number]  Date of Service: 2022    Attempted to see pt for PT evaluation, pt up independently in room with ww. No inpatient PT needs at this time.    Myla PT [id number]

## 2022-08-31 NOTE — PROGRESS NOTES
[facility] MD Lety Singh MD Geralene Kotyk, MD Lorelei Cobb, CNP   Linda Anguiano, ANGELO  Progress Note    8/31/2022    SUBJECTIVE:   8/18: Complaining of pain in his left upper and lower extremities. Tingling in both extremities. Swelling, though in the left upper extremity due to infiltrated IV. (-) sob on room air. No cp/palp Appetite poor    8/20 seen in her room on RA , alert oriented cooperative stable bp .     8/21 low UO swelling noted     8/22 getting temp IJ HD line then plan daily HD for solute and volume management     8.23 seen on HD tolerating treatment still with scrotal edema and Upper LUE edema . No sensation issues . Ortho note reviewed     8/24 HD today with no reported issues   Still no signs of recovery   Good PO intake    8/25 comfortable on RA , he is telling me he has some UO though not impressive . 8/26 : cr trending at anephric rate . Bp stable on RA , UO marginal      8/27: Ongoing left upper and left lower extremity pain, cramps, aching. Good appetite and intake. HD yesterday without event, 2 L net UF. No SOB on room air.    8/28: cramping. Swelling, though more diffusely. Urine output is picked up    8/29: Seen this morning on dialysis. Tolerating therapy. BP stable. BFR prescribed. Ongoing diffuse muscle cramping, predominantly in the lower extremities bilaterally. Diffuse edema, worse in left upper and lower extremities, also improving.    8/30: Seen this morning. As of 11:30 AM he had already made 1 L of urine. Ongoing cramping in left upper and lower extremities, mostly in his left thigh. No dyspnea at rest on room air. Swelling much improved. ROS otherwise unremarkable    8/31: patient seen and examined this morning, 4.5L UO yesterday. Continued cramping in left LE, ortho consult per primary team to evaluate for compartment syndrome. Has been able to ambulate with improvement.  Swelling localized to bilateral ankles. No dyspnea. He did refuse HD catheter yesterday. Did not receive dialysis yesterday, plan for no dialysis today. PROBLEM LIST:    Principal Problem:    Hyperkalemia  Active Problems:    Acute kidney injury ([address]    Fall    IV infiltrate, initial encounter    Leukocytosis    Traumatic rhabdomyolysis (HCC)    Elevated LFTs  Resolved Problems:    * No resolved hospital problems. *         DIET:    ADULT DIET;  Regular  ADULT ORAL NUTRITION SUPPLEMENT; Lunch, Dinner; Standard High Calorie/High Protein Oral Supplement     MEDS (scheduled):    ceFAZolin  2,000 mg IntraVENous Once    bumetanide  2 mg Oral BID    carvedilol  12.5 mg Oral BID    amLODIPine  5 mg Oral Daily    sennosides-docusate sodium  2 tablet Oral BID    sodium chloride flush  5-40 mL IntraVENous 2 times per day    morphine  4 mg IntraVENous Once    sodium chloride flush  5-40 mL IntraVENous 2 times per day       MEDS (infusions):   sodium chloride      dextrose      sodium chloride         MEDS (prn):  labetalol, heparin (porcine), sodium chloride flush, sodium chloride, morphine, heparin flush, glucose, dextrose bolus **OR** dextrose bolus, glucagon (rDNA), dextrose, sodium chloride flush, sodium chloride, polyethylene glycol    PHYSICAL EXAM:     Patient Vitals for the past 24 hrs:   BP Temp Temp src Pulse Resp SpO2   08/31/22 0820 (!) 148/89 -- -- (!) 117 -- --   08/31/22 0100 (!) 167/103 98.3 °F (36.8 °C) Oral (!) 103 20 95 %   08/30/22 1945 (!) 161/104 98.5 °F (36.9 °C) Oral (!) 102 18 100 %   08/30/22 1840 137/86 98.4 °F (36.9 °C) Oral (!) 102 -- 100 %   08/30/22 1643 -- -- -- -- 18 --   08/30/22 1613 -- -- -- -- 18 --   08/30/22 1001 (!) 166/96 98.2 °F (36.8 °C) Oral 97 20 --     @      Intake/Output Summary (Last 24 hours) at 8/31/2022 0820  Last data filed at 8/31/2022 0106  Gross per 24 hour   Intake --   Output 4525 ml   Net -4525 ml           Wt Readings from Last 3 Encounters:   04/30/18 145 lb (65.8 kg)   12/14/17 155 lb (70.3 kg)   09/14/17 165 lb (74.8 kg)       Constitutional:  in no acute distress  HEENT: NC/AT, EOMI, sclera and conjunctiva are clear and anicteric, mucus membranes moist  Neck: Trachea midline, no JVD  Cardiovascular: S1, S2 regular rhythm, no murmur,or rub  Respiratory:  No crackles, no wheeze  Gastrointestinal:  Soft, nontender, nondistended, NABS  Ext: Left lower extremity edema, mild, though otherwise no edema, feet warm  Skin: dry, no rash  Neuro: awake, alert, interactive. Moves all 4 extremities      DATA:    Recent Labs     08/29/22  0620 08/30/22  0235 08/31/22  0523   WBC 11.8* 11.8* 11.1   HGB 7.1* 7.0* 7.3*   HCT 21.2* 21.4* 22.8*   MCV 88.7 93.4 92.3    291 386       Recent Labs     08/29/22  0620 08/30/22  0235 08/31/22  0523    136  137 134   K 4.8 4.5  4.6 4.6   CL 99 100  101 96*   CO2 28 26  27 29   MG 1.8 1.8 1.7   PHOS 4.8* 4.2 4.6*   BUN 38* 24*  24* 32*   CREATININE 6.4* 4.4*  4.4* 4.6*   * 97*  --    AST 57* 45*  --    BILITOT 0.6 0.4  --    ALKPHOS 48 51  --          Lab Results   Component Value Date    LABPROT 2.0 (H) 08/17/2022    LABPROT 2.0 08/17/2022       Assessment  1. Acute kidney injury, severe, oliguric secondary to rhabdomyolysis, itself severe    2. Rhabdomyolysis, severe, total CK on presentation 398,000, improving to total CK 8/18 morning 155,080. Awaiting reporting of this morning's true total CK (currently just listed as greater than 22,000). Does not appear to have compartment syndrome in either left upper or lower extremity, though both are quite sore, and he complains of tingling.   -8/31 CK is 965    3. Hyperkalemia due to HELGA, and rhabdomyolysis. - improved    4. Hyperphosphatemia, due to rhabdomyolysis, HELGA    5.   Transaminitis, improving    CK improving  Urine output is picked up markedly  - 4.5 L UO 8/30  Volume status improving  Temp RIJ HD line 8/22     Demonstrating early signs of recovery particular with the increased urine output.     Recommendations  No dialysis today   -refused tunneled catheter 8/30  Continue to follow closely for signs of recovery  Tunneled dialysis line per IR, probably on Friday (only limited procedure list availability), only if necessary  Increase Bumex to 2 mg twice daily  Daily bmp , ck   Has been accepted at [facility] for discharge pending labs tomorrow        Electronically signed by Hernan Millard DO on 8/31/2022

## 2022-08-31 NOTE — CARE COORDINATION
Pt refused tunneled cath yesterday- for possible tunneled cath if needed on Friday as per renal note. Plan [facility] when tunneled cath inserted- [facility] in [facility]- bon transport form on chart. Will follow Hortensia Dee RNcase manager    1300: Spoke w/ Dr. Cornelia Lozano- tentative plan pending progress is to hold dialysis and possibly pull temporary HD cath tomorrow. Per Ze @ [facility], patient will not have LTAC criteria if HD is not needed. Per PT/OT, pt declined therapy-states he is up independently w/ [employer]. Discharge plan will be to return home w/ his mother if HD is not required. VM left w/ pt's mother Nahomi Acosta [phone] to discuss discharge planning- awaiting return call.  Hortensia Dee RN case manager

## 2022-08-31 NOTE — PROGRESS NOTES
Occupational Therapy    OT eval and treat orders received and chart reviewed. Attempt made but pt reports he is able to complete own self care and walk to and from bathroom. Pt up independently in room. Pt with no acute OT needs at this time and no further questions or concerns. OT orders discontinued and pt agreed. Please re-order if status changes.     Lorena Dent, OTR/L

## 2022-08-31 NOTE — PROGRESS NOTES
[facility] MD Elkin Armstrong, MD Roxanne Max, MD Hay Sen, CNP   Linda Anguiano, ANGELO  Progress Note    8/30/2022    SUBJECTIVE:   8/18: Complaining of pain in his left upper and lower extremities. Tingling in both extremities. Swelling, though in the left upper extremity due to infiltrated IV. (-) sob on room air. No cp/palp Appetite poor    8/20 seen in her room on RA , alert oriented cooperative stable bp .     8/21 low UO swelling noted     8/22 getting temp IJ HD line then plan daily HD for solute and volume management     8.23 seen on HD tolerating treatment still with scrotal edema and Upper LUE edema . No sensation issues . Ortho note reviewed     8/24 HD today with no reported issues   Still no signs of recovery   Good PO intake    8/25 comfortable on RA , he is telling me he has some UO though not impressive . 8/26 : cr trending at anephric rate . Bp stable on RA , UO marginal      8/27: Ongoing left upper and left lower extremity pain, cramps, aching. Good appetite and intake. HD yesterday without event, 2 L net UF. No SOB on room air.    8/28: cramping. Swelling, though more diffusely. Urine output is picked up    8/29: Seen this morning on dialysis. Tolerating therapy. BP stable. BFR prescribed. Ongoing diffuse muscle cramping, predominantly in the lower extremities bilaterally. Diffuse edema, worse in left upper and lower extremities, also improving.    8/30: Seen this morning. As of 11:30 AM he had already made 1 L of urine. Ongoing cramping in left upper and lower extremities, mostly in his left thigh. No dyspnea at rest on room air. Swelling much improved.   ROS otherwise unremarkable      PROBLEM LIST:    Principal Problem:    Hyperkalemia  Active Problems:    Acute kidney injury ([address]    Fall    IV infiltrate, initial encounter    Leukocytosis    Traumatic rhabdomyolysis (HCC)    Elevated LFTs  Resolved Problems:    * No resolved hospital problems. *         DIET:    ADULT DIET;  Regular  ADULT ORAL NUTRITION SUPPLEMENT; Lunch, Dinner; Standard High Calorie/High Protein Oral Supplement     MEDS (scheduled):    ceFAZolin  2,000 mg IntraVENous Once    carvedilol  12.5 mg Oral BID    amLODIPine  5 mg Oral Daily    sennosides-docusate sodium  2 tablet Oral BID    sodium chloride flush  5-40 mL IntraVENous 2 times per day    morphine  4 mg IntraVENous Once    bumetanide  2 mg Oral Daily    sodium chloride flush  5-40 mL IntraVENous 2 times per day       MEDS (infusions):   sodium chloride      dextrose      sodium chloride         MEDS (prn):  labetalol, heparin (porcine), sodium chloride flush, sodium chloride, morphine, heparin flush, glucose, dextrose bolus **OR** dextrose bolus, glucagon (rDNA), dextrose, sodium chloride flush, sodium chloride, polyethylene glycol    PHYSICAL EXAM:     Patient Vitals for the past 24 hrs:   BP Temp Temp src Pulse Resp SpO2   08/30/22 1945 (!) 161/104 98.5 °F (36.9 °C) Oral (!) 102 18 100 %   08/30/22 1840 137/86 98.4 °F (36.9 °C) Oral (!) 102 -- 100 %   08/30/22 1643 -- -- -- -- 18 --   08/30/22 1613 -- -- -- -- 18 --   08/30/22 1001 (!) 166/96 98.2 °F (36.8 °C) Oral 97 20 --   08/30/22 0215 (!) 154/83 98.5 °F (36.9 °C) Oral (!) 102 18 100 %   @      Intake/Output Summary (Last 24 hours) at 8/30/2022 2227  Last data filed at 8/30/2022 1919  Gross per 24 hour   Intake --   Output 2600 ml   Net -2600 ml         Wt Readings from Last 3 Encounters:   04/30/18 145 lb (65.8 kg)   12/14/17 155 lb (70.3 kg)   09/14/17 165 lb (74.8 kg)       Constitutional:  in no acute distress  HEENT: NC/AT, EOMI, sclera and conjunctiva are clear and anicteric, mucus membranes moist  Neck: Trachea midline, no JVD  Cardiovascular: S1, S2 regular rhythm, no murmur,or rub  Respiratory:  No crackles, no wheeze  Gastrointestinal:  Soft, nontender, nondistended, NABS  Ext: Left lower extremity edema, mild, though otherwise no edema, feet warm  Skin: dry, no rash  Neuro: awake, alert, interactive. Moves all 4 extremities      DATA:    Recent Labs     08/28/22  0400 08/29/22  0620 08/30/22  0235   WBC 11.4 11.8* 11.8*   HGB 7.5* 7.1* 7.0*   HCT 22.6* 21.2* 21.4*   MCV 89.0 88.7 93.4    262 291     Recent Labs     08/28/22  0400 08/29/22  0620 08/30/22  0235    136 136  137   K 4.9 4.8 4.5  4.6    99 100  101   CO2 29 28 26  27   MG 2.0 1.8 1.8   PHOS 4.1 4.8* 4.2   BUN 28* 38* 24*  24*   CREATININE 5.4* 6.4* 4.4*  4.4*   * 104* 97*   AST 81* 57* 45*   BILIDIR <0.2  --   --    BILITOT 0.6 0.6 0.4   ALKPHOS 43 48 51       Lab Results   Component Value Date    LABPROT 2.0 (H) 08/17/2022    LABPROT 2.0 08/17/2022       Assessment  1. Acute kidney injury, severe, oliguric secondary to rhabdomyolysis, itself severe    2. Rhabdomyolysis, severe, total CK on presentation 398,000, improving to total CK 8/18 morning 155,080. Awaiting reporting of this morning's true total CK (currently just listed as greater than 22,000). Does not appear to have compartment syndrome in either left upper or lower extremity, though both are quite sore, and he complains of tingling. 3.  Hyperkalemia due to HELGA, and rhabdomyolysis. 4.  Hyperphosphatemia, due to rhabdomyolysis, HELGA    5. Transaminitis, improving    CK improving  Urine output is picked up markedly  Volume status improving  Temp RIJ HD line 8/22     Demonstrating early signs of recovery particular with the increased urine output.     Recommendations  No dialysis today  Tentatively plan to hold dialysis tomorrow though will evaluate in early a.m. as to needs   Continue to follow closely for signs of recovery  Tunneled dialysis line per IR, probably on Friday (only limited procedure list availability), only if necessary  Increase Bumex to 2 mg twice daily  Daily bmp , ck         Electronically signed by Eleonora Valle MD on 8/30/2022

## 2022-08-31 NOTE — PROGRESS NOTES
Call placed to [address] per Dr. Shravan Hernandez request verifying an Orthopedic doctor will be seeing the patient today. Dr. Bella , per , is in surgery today and is unsure where. Left message for Dr. Eric Diamond nurse to inquire. Awaiting call back.   Valentín Delong RN

## 2022-08-31 NOTE — PROGRESS NOTES
[facility] Progress Note    Admitting Date and Time: 8/17/2022  9:25 AM  Admit Dx: Hyperkalemia [E87.5]  Shock liver [K72.00]  Acute kidney injury ([address] [N17.9]  Fall, initial encounter [W19. XXXA]  Traumatic rhabdomyolysis, initial encounter ([facility]. 6XXA]  Leukocytosis, unspecified type [D72.829]    Subjective:  Patient is being followed for Hyperkalemia [E87.5]  Shock liver [K72.00]  Acute kidney injury ([address] [N17.9]  Fall, initial encounter [W19. XXXA]  Traumatic rhabdomyolysis, initial encounter ([facility]. 6XXA]  Leukocytosis, unspecified type [D72.829]     Patient sitting up in bed. Patient states he continues to have cramping in his left leg. Heating pad has minimally improved symptoms. ROS: denies fever, chills, cp, sob, n/v, HA unless stated above.       ceFAZolin  2,000 mg IntraVENous Once    bumetanide  2 mg Oral BID    carvedilol  12.5 mg Oral BID    amLODIPine  5 mg Oral Daily    sennosides-docusate sodium  2 tablet Oral BID    sodium chloride flush  5-40 mL IntraVENous 2 times per day    morphine  4 mg IntraVENous Once    sodium chloride flush  5-40 mL IntraVENous 2 times per day     labetalol, 10 mg, Q4H PRN  heparin (porcine), 2,200 Units, PRN  sodium chloride flush, 5-40 mL, PRN  sodium chloride, , PRN  morphine, 4 mg, Q4H PRN  heparin flush, 100 Units, PRN  glucose, 4 tablet, PRN  dextrose bolus, 125 mL, PRN   Or  dextrose bolus, 250 mL, PRN  glucagon (rDNA), 1 mg, PRN  dextrose, , Continuous PRN  sodium chloride flush, 5-40 mL, PRN  sodium chloride, , PRN  polyethylene glycol, 17 g, Daily PRN       Objective:    BP (!) 167/103   Pulse (!) 103   Temp 98.3 °F (36.8 °C) (Oral)   Resp 20   Ht 5' 10\" (1.778 m)   Wt 199 lb 4.7 oz (90.4 kg)   SpO2 95%   BMI 28.60 kg/m²   General Appearance: alert and oriented to person, place and time and in no acute distress  Skin: warm and dry  Head: normocephalic and atraumatic  Eyes: pupils equal, round, and reactive to light, extraocular eye movements intact, conjunctivae normal  Neck: neck supple and non tender without mass   Pulmonary/Chest: clear to auscultation bilaterally- no wheezes, rales or rhonchi, normal air movement, no respiratory distress  Cardiovascular: normal rate, normal S1 and S2 and no carotid bruits  Abdomen: soft, non-tender, non-distended, normal bowel sounds, no masses or organomegaly  Extremities: no cyanosis, no clubbing and no edema    Neurologic: no cranial nerve deficit and speech normal        Recent Labs     08/29/22  0620 08/30/22  0235 08/31/22  0523    136  137 134   K 4.8 4.5  4.6 4.6   CL 99 100  101 96*   CO2 28 26  27 29   BUN 38* 24*  24* 32*   CREATININE 6.4* 4.4*  4.4* 4.6*   GLUCOSE 105* 110*  118* 98   CALCIUM 8.1* 8.2*  8.2* 8.8         Recent Labs     08/29/22  0620 08/30/22 0235 08/31/22  0523   WBC 11.8* 11.8* 11.1   RBC 2.39* 2.29* 2.47*   HGB 7.1* 7.0* 7.3*   HCT 21.2* 21.4* 22.8*   MCV 88.7 93.4 92.3   MCH 29.7 30.6 29.6   MCHC 33.5 32.7 32.0   RDW 15.3* 15.4* 14.9    291 386   MPV 9.9 9.7 9.4         Radiology: None    Assessment:    Principal Problem:    Hyperkalemia  Active Problems:    Acute kidney injury (HCC)    Fall    IV infiltrate, initial encounter    Leukocytosis    Traumatic rhabdomyolysis (HCC)    Elevated LFTs  Resolved Problems:    * No resolved hospital problems. *      Plan:  1. Rhabdomyolysis: S/p fall at home. Unknown down time, but was seen well 24 hours prior to admission. Presented with severe left leg and arm pain. CK initially >350,000 now down to 11,468>6,953>2997>1238>965. Developed anasarca and worsening renal failure after IVF resuscitation. 2. HELGA: Felt secondary to rhabdomyolysis. Treated initially with IVF, but patient developed anasarca and concern for compartment syndrome. Received IV Bumex. Renal function worsened, with peak at 12.8. Temporary HD cath inserted and HD started on 8/22.  Received daily HD, held on 8/25 then Cr worsened to 9.1. HD restarted with improvement of Cr. Avoid nephrotoxic agents. Nephrology following. Plans for tunneled cath on Friday if patient is agreeable and if necessary. Vascular consulted. 3. Transaminitis: Due to rhabdo. Tylenol was unremarkable. Abdominal imagining negative. UDS positive for cocaine and fentanyl. Avoid heptotoxic agents. GI consulted. No immediate plans for intervention. Should follow up in the office 2-3 weeks after discharge. 4. Hyperkalemia: Resolved. K on admission 5.6. Ca+ gluconate, HCO3, Regular Insulin and lokelma given in ED. EKG in ED showing no evidence of acute ischemic injury, but peaked T waves in multiple leads. Monitor. Nephrology following. 5. Drug Use: Admitted to taking \"pills\" on admission. UDS positive for cocaine and fentanyl. Encourage cessation. 6. Elevated glucose: No known DM. On admission glucose was 487. Now in the 100s. Hemoglobin a1c 5.9. Monitor. 7. Left thigh pain: Concern for compartment syndrome. Orthopedics x2 consulted. Prophylactic fasciotomy considered but patient and family declining at this time. Encourage mobilization. PT/OT. Ortho re consulted. 8. Hypervolemic hyponatremia: Resolved. Managed with volume removal. On Bumex and HD. 9. Leukocytosis: Resolved. ID consulted. Believe this is associated to rhabdo, and pain. Antibiotics not warranted. 10. Hypertension: Continue Coreg. Monitor and adjust prn. Dispo: Has been accepted at [facility]. NOTE: This report was transcribed using voice recognition software. Every effort was made to ensure accuracy; however, inadvertent computerized transcription errors may be present. Electronically signed by Hank Naranjo PA-C on 8/31/2022 at 8:10 AM    15 minutes time spent reviewing patient chart, assessing patient, discussing plan of care with patient and family, discussing plan of care with collaborating physician, and charting.     HOSPITALIST ATTENDING PHYSICIAN NOTE 8/31/2022 1936PM:    Details of the evaluation - subjective assessment (including medication profile, past medical, family and social history when applicable), examination, review of lab and test data, diagnostic impressions and medical decision making - performed by Anjum Dan PA-C, were discussed with me on the date of service and I agree with clinical information herein unless otherwise noted. The patient has been evaluated by me personally earlier today. Pt reports no fevers, chills,n/v.     Exam: heart reg at rate of 102, lungs cta, abd pos bs soft nt, ext neg for le edema    I agree with the assessment and plan of Daphnie Echevarria PA-C    Rhabdomyolysis  Manjinder  Hyperkalemia  Elevated lfts  Hyperglycemia  hyponatremia  Leukocytosis    I have been assessing pt each day and pt had been having severe pain. Pt told me yesterday that pt was slightly improved. Today pt states it is at worst when cramps. Since pain seems to be improving, will transition to po pain medication. Electronically signed by Nohemi Alexandra D.O.   Hospitalist  [facility]

## 2022-09-01 LAB
ANION GAP SERPL CALCULATED.3IONS-SCNC: 12 MMOL/L (ref 7–16)
BASOPHILS ABSOLUTE: 0.07 E9/L (ref 0–0.2)
BASOPHILS RELATIVE PERCENT: 0.7 % (ref 0–2)
BUN BLDV-MCNC: 39 MG/DL (ref 6–20)
CALCIUM SERPL-MCNC: 9.4 MG/DL (ref 8.6–10.2)
CHLORIDE BLD-SCNC: 94 MMOL/L (ref 98–107)
CO2: 31 MMOL/L (ref 22–29)
CREAT SERPL-MCNC: 4.4 MG/DL (ref 0.7–1.2)
EOSINOPHILS ABSOLUTE: 0.32 E9/L (ref 0.05–0.5)
EOSINOPHILS RELATIVE PERCENT: 3.3 % (ref 0–6)
GFR AFRICAN AMERICAN: 18
GFR NON-AFRICAN AMERICAN: 18 ML/MIN/1.73
GLUCOSE BLD-MCNC: 140 MG/DL (ref 74–99)
HCT VFR BLD CALC: 24.2 % (ref 37–54)
HEMOGLOBIN: 8 G/DL (ref 12.5–16.5)
IMMATURE GRANULOCYTES #: 0.03 E9/L
IMMATURE GRANULOCYTES %: 0.3 % (ref 0–5)
LYMPHOCYTES ABSOLUTE: 2.79 E9/L (ref 1.5–4)
LYMPHOCYTES RELATIVE PERCENT: 28.9 % (ref 20–42)
MAGNESIUM: 1.7 MG/DL (ref 1.6–2.6)
MCH RBC QN AUTO: 30.2 PG (ref 26–35)
MCHC RBC AUTO-ENTMCNC: 33.1 % (ref 32–34.5)
MCV RBC AUTO: 91.3 FL (ref 80–99.9)
MONOCYTES ABSOLUTE: 0.75 E9/L (ref 0.1–0.95)
MONOCYTES RELATIVE PERCENT: 7.8 % (ref 2–12)
NEUTROPHILS ABSOLUTE: 5.68 E9/L (ref 1.8–7.3)
NEUTROPHILS RELATIVE PERCENT: 59 % (ref 43–80)
PDW BLD-RTO: 14.7 FL (ref 11.5–15)
PHOSPHORUS: 5.5 MG/DL (ref 2.5–4.5)
PLATELET # BLD: 528 E9/L (ref 130–450)
PMV BLD AUTO: 9.6 FL (ref 7–12)
POTASSIUM SERPL-SCNC: 4.1 MMOL/L (ref 3.5–5)
RBC # BLD: 2.65 E12/L (ref 3.8–5.8)
SODIUM BLD-SCNC: 137 MMOL/L (ref 132–146)
TOTAL CK: 647 U/L (ref 20–200)
WBC # BLD: 9.6 E9/L (ref 4.5–11.5)

## 2022-09-01 PROCEDURE — 82550 ASSAY OF CK (CPK): CPT

## 2022-09-01 PROCEDURE — 6370000000 HC RX 637 (ALT 250 FOR IP): Performed by: INTERNAL MEDICINE

## 2022-09-01 PROCEDURE — 2580000003 HC RX 258: Performed by: ORTHOPAEDIC SURGERY

## 2022-09-01 PROCEDURE — 99232 SBSQ HOSP IP/OBS MODERATE 35: CPT | Performed by: STUDENT IN AN ORGANIZED HEALTH CARE EDUCATION/TRAINING PROGRAM

## 2022-09-01 PROCEDURE — 2060000000 HC ICU INTERMEDIATE R&B

## 2022-09-01 PROCEDURE — 36415 COLL VENOUS BLD VENIPUNCTURE: CPT

## 2022-09-01 PROCEDURE — 84100 ASSAY OF PHOSPHORUS: CPT

## 2022-09-01 PROCEDURE — 99232 SBSQ HOSP IP/OBS MODERATE 35: CPT | Performed by: NURSE PRACTITIONER

## 2022-09-01 PROCEDURE — 83735 ASSAY OF MAGNESIUM: CPT

## 2022-09-01 PROCEDURE — 85025 COMPLETE CBC W/AUTO DIFF WBC: CPT

## 2022-09-01 PROCEDURE — 80048 BASIC METABOLIC PNL TOTAL CA: CPT

## 2022-09-01 PROCEDURE — 2580000003 HC RX 258: Performed by: NURSE PRACTITIONER

## 2022-09-01 RX ADMIN — Medication 10 ML: at 09:50

## 2022-09-01 RX ADMIN — CARVEDILOL 12.5 MG: 6.25 TABLET, FILM COATED ORAL at 09:50

## 2022-09-01 RX ADMIN — OXYCODONE AND ACETAMINOPHEN 1 TABLET: 5; 325 TABLET ORAL at 03:23

## 2022-09-01 RX ADMIN — OXYCODONE AND ACETAMINOPHEN 1 TABLET: 5; 325 TABLET ORAL at 22:56

## 2022-09-01 RX ADMIN — AMLODIPINE BESYLATE 5 MG: 5 TABLET ORAL at 09:50

## 2022-09-01 RX ADMIN — OXYCODONE AND ACETAMINOPHEN 1 TABLET: 5; 325 TABLET ORAL at 18:58

## 2022-09-01 RX ADMIN — CARVEDILOL 12.5 MG: 6.25 TABLET, FILM COATED ORAL at 21:01

## 2022-09-01 RX ADMIN — OXYCODONE AND ACETAMINOPHEN 1 TABLET: 5; 325 TABLET ORAL at 12:54

## 2022-09-01 ASSESSMENT — PAIN SCALES - GENERAL
PAINLEVEL_OUTOF10: 2
PAINLEVEL_OUTOF10: 8
PAINLEVEL_OUTOF10: 10
PAINLEVEL_OUTOF10: 10
PAINLEVEL_OUTOF10: 8

## 2022-09-01 ASSESSMENT — PAIN DESCRIPTION - PAIN TYPE: TYPE: ACUTE PAIN

## 2022-09-01 ASSESSMENT — PAIN DESCRIPTION - ONSET: ONSET: ON-GOING

## 2022-09-01 ASSESSMENT — PAIN DESCRIPTION - DESCRIPTORS
DESCRIPTORS: ACHING
DESCRIPTORS: ACHING;CRAMPING
DESCRIPTORS: ACHING;CRAMPING;DISCOMFORT

## 2022-09-01 ASSESSMENT — PAIN DESCRIPTION - ORIENTATION
ORIENTATION: LEFT

## 2022-09-01 ASSESSMENT — PAIN DESCRIPTION - LOCATION
LOCATION: LEG
LOCATION: NECK
LOCATION: LEG
LOCATION: NECK
LOCATION: HIP;LEG;BUTTOCKS

## 2022-09-01 ASSESSMENT — PAIN - FUNCTIONAL ASSESSMENT
PAIN_FUNCTIONAL_ASSESSMENT: ACTIVITIES ARE NOT PREVENTED
PAIN_FUNCTIONAL_ASSESSMENT: PREVENTS OR INTERFERES SOME ACTIVE ACTIVITIES AND ADLS
PAIN_FUNCTIONAL_ASSESSMENT: PREVENTS OR INTERFERES SOME ACTIVE ACTIVITIES AND ADLS

## 2022-09-01 ASSESSMENT — PAIN DESCRIPTION - FREQUENCY: FREQUENCY: CONTINUOUS

## 2022-09-01 NOTE — PROGRESS NOTES
[facility] Progress Note    Admitting Date and Time: 8/17/2022  9:25 AM  Admit Dx: Hyperkalemia [E87.5]  Shock liver [K72.00]  Acute kidney injury ([facility] [N17.9]  Fall, initial encounter [W19. XXXA]  Traumatic rhabdomyolysis, initial encounter ([facility] [address] Hiro Jewels. 6XXA]  Leukocytosis, unspecified type [D72.829]      Assessment:    Principal Problem:    Hyperkalemia  Active Problems:    Acute kidney injury ([facility] [address]    Fall    IV infiltrate, initial encounter    Leukocytosis    Traumatic rhabdomyolysis (HCC)    Elevated LFTs  Resolved Problems:    * No resolved hospital problems. *      Plan:  Traumatic or toxic rhabdomyolysis  - he was found down by mother, unknown down time but was last seen well 24 hours prior. Had complained of severe left leg and arm pain. CPK initially > 350,000, with transamanitis. CPK trended down to 600 range. LFTs nearly normalized    Urine tox +ve cocaine and fentanyl  Tylenol level normal     Treated initially with aggressive IV fluids     After IV fluid resuscitation - developed anasarca and worsening renal failure. Treated with several days HD -> he is now non-oliguric, good urine output and renal function has stabilized. Plan to remove HD catheter and aim toward discharge home with Trung Hernandes. 2. HELGA  - felt secondary to above rhabdomyolysis  Treated initially with IV fluids, then developed anasarca and concern for compartment syndrome. Treated with IV Bumex. Renal function further worsened. Cr peaked at 12.8. Had temporary HD cath inserted and started HD 8/22  Cr improved down to 4.4    Patient has declined further HD and does not want tunneled cath placed to continue as outpatient    Plan is to remove his temporary catheter today and follow up chemistry in AM.  Probable DC home tomorrow     3. Transamanitis  - due to rhabdo  Tylenol was unremarkable  Abdominal imaging negative for acute process. LFTs much improved     4.  Hyperkalemia  - due to above  Initial K was 8.3  Was treated emergently with Ca+Gluc/HCO3/Regular Insulin/Lokelma  RESOLVED. 5. Hypervolemic hyponatremia  - managed with volume removal  RESOLVED  Bumex is currently on hold. 6. Left arm and thigh pain  - due to rhabdomyolysis  Orthopedics were consulted x 2 re: concern for compartment syndrome given severity of pain and significant swelling. Offered prophylactic fasciotomy but patient and mother declined. Over following days, his pain is slowly improving. Left arm symtpoms essentially resolved. Left leg pain/swelling improving daily. He is ambulating with walker. 7. HTN  - continue norvasc and carvedilol       Subjective:  Patient is being followed for Hyperkalemia [E87.5]  Shock liver [K72.00]  Acute kidney injury ([facility] [address] [N17.9]  Fall, initial encounter [W19. XXXA]  Traumatic rhabdomyolysis, initial encounter ([facility] [address] Gwenith Mighty. 6XXA]  Leukocytosis, unspecified type [D72.829]     He is comfortable, pleasant, no new acute complaints. States left arm pain/swelling and tingling essentially resolved. He feels left leg is following similar pattern and improving. Plan is to remove temporary HD catheter today and follow renal function/urine output overnight with anticipated DC to home tomorrow     ROS: denies fever, chills, cp, sob, n/v, HA unless stated above.       ceFAZolin  2,000 mg IntraVENous See Admin Instructions    [Held by provider] bumetanide  2 mg Oral BID    carvedilol  12.5 mg Oral BID    amLODIPine  5 mg Oral Daily    sennosides-docusate sodium  2 tablet Oral BID    sodium chloride flush  5-40 mL IntraVENous 2 times per day    sodium chloride flush  5-40 mL IntraVENous 2 times per day     oxyCODONE-acetaminophen, 1 tablet, Q4H PRN  labetalol, 10 mg, Q4H PRN  heparin (porcine), 2,200 Units, PRN  sodium chloride flush, 5-40 mL, PRN  sodium chloride, , PRN  heparin flush, 100 Units, PRN  glucose, 4 tablet, PRN  dextrose bolus, 125 mL, PRN   Or  dextrose bolus, 250 mL, PRN  glucagon (rDNA), 1 mg, PRN  dextrose, , Continuous PRN  sodium chloride flush, 5-40 mL, PRN  sodium chloride, , PRN  polyethylene glycol, 17 g, Daily PRN         Objective:    BP (!) 145/90   Pulse 94   Temp 98.7 °F (37.1 °C) (Oral)   Resp 18   Ht 5' 10\" (1.778 m)   Wt 199 lb 4.7 oz (90.4 kg)   SpO2 100%   BMI 28.60 kg/m²     General Appearance: resting in bed, no acute distress. Pain is improved  Skin: warm and dry  Head: normocephalic and atraumatic  Eyes: pupils equal, round, and reactive to light, extraocular eye movements intact, conjunctivae normal  Neck: neck supple and non tender without mass   Pulmonary/Chest: clear to auscultation bilaterally- no wheezes, rales or rhonchi, normal air movement, no respiratory distress  Cardiovascular: normal rate, normal S1 and S2 and no carotid bruits  Abdomen: soft, non-tender, non-distended, normal bowel sounds, no masses or organomegaly  Extremities: no cyanosis, no clubbing and no edema  Neurologic: no cranial nerve deficit and speech normal        Recent Labs     08/30/22 0235 08/31/22  0523 09/01/22  0557     137 134 137   K 4.5  4.6 4.6 4.1     101 96* 94*   CO2 26  27 29 31*   BUN 24*  24* 32* 39*   CREATININE 4.4*  4.4* 4.6* 4.4*   GLUCOSE 110*  118* 98 140*   CALCIUM 8.2*  8.2* 8.8 9.4       Recent Labs     08/30/22 0235 08/31/22 0523 09/01/22  0557   WBC 11.8* 11.1 9.6   RBC 2.29* 2.47* 2.65*   HGB 7.0* 7.3* 8.0*   HCT 21.4* 22.8* 24.2*   MCV 93.4 92.3 91.3   MCH 30.6 29.6 30.2   MCHC 32.7 32.0 33.1   RDW 15.4* 14.9 14.7    386 528*   MPV 9.7 9.4 9.6       Radiology:   Time spent reviewing chart, clinical exam, discussing case and answering questions with staff/consultants/patient/family = 20 minutes      NOTE: This report was transcribed using voice recognition software. Every effort was made to ensure accuracy; however, inadvertent computerized transcription errors may be present.   Electronically signed by KARY Garvey CNP on 9/1/2022 at 2:10 PM      Addendum: I have personally participated in the history, exam, medical decision making with  Jarek Karl  on the date of service and I agree with all of the pertinent clinical information unless otherwise noted. I have also reviewed and agree with the past medical, family, and social history unless otherwise noted. Patient was admitted with traumatic rhabdomyolysis, HELGA, hyperkalemia. PHYSICAL EXAM:  Vitals:  /75   Pulse (!) 105   Temp 98.4 °F (36.9 °C) (Oral)   Resp 18   Ht 5' 10\" (1.778 m)   Wt 199 lb 4.7 oz (90.4 kg)   SpO2 99%   BMI 28.60 kg/m²   Gen: awake, alert, NAD  Lungs: clear to auscultation bilaterally no crackles no wheezing. Heart: RRR, no murmur   Abdomen: soft nontender nondistended positive bowel sounds. Extremities: full range of motion no peripheral edema. Impression:  Principal Problem:    Hyperkalemia  Active Problems:    Acute kidney injury ([address]    Fall    IV infiltrate, initial encounter    Leukocytosis    Traumatic rhabdomyolysis (HCC)    Elevated LFTs        My findings/plan include:  Acute kidney injury requiring HD. Improved, monitoring off HD. Good urine output, renal function improving. Plans are to monitor labs with possible discharge tomorrow. Traumatic/toxic rhabdomyolysis - improved with IV fluids. Elevated LFTs  Hyperkalemia: Resolved  Left arm and thigh pain- 2/2 to rhabdomyolysis, offered prophylactic fasciotomy by orthopedics and patient and mother declined. Hypertension-continue Norvasc and Coreg    NOTE: This report was transcribed using voice recognition software. Every effort was made to ensure accuracy; however, inadvertent computerized transcription errors may be present.   Electronically signed by Trevor Carroll MD on 9/1/2022 at 2:57 PM

## 2022-09-01 NOTE — PROGRESS NOTES
[facility] MD Erik Bangura, MD Imani Mueller, MD Courtney Powell, CNP   Linda Anguiano, ANGELO  Progress Note    8/31/2022    SUBJECTIVE:   8/18: Complaining of pain in his left upper and lower extremities. Tingling in both extremities. Swelling, though in the left upper extremity due to infiltrated IV. (-) sob on room air. No cp/palp Appetite poor    8/20 seen in her room on RA , alert oriented cooperative stable bp .     8/21 low UO swelling noted     8/22 getting temp IJ HD line then plan daily HD for solute and volume management     8.23 seen on HD tolerating treatment still with scrotal edema and Upper LUE edema . No sensation issues . Ortho note reviewed     8/24 HD today with no reported issues   Still no signs of recovery   Good PO intake    8/25 comfortable on RA , he is telling me he has some UO though not impressive . 8/26 : cr trending at anephric rate . Bp stable on RA , UO marginal      8/27: Ongoing left upper and left lower extremity pain, cramps, aching. Good appetite and intake. HD yesterday without event, 2 L net UF. No SOB on room air.    8/28: cramping. Swelling, though more diffusely. Urine output is picked up    8/29: Seen this morning on dialysis. Tolerating therapy. BP stable. BFR prescribed. Ongoing diffuse muscle cramping, predominantly in the lower extremities bilaterally. Diffuse edema, worse in left upper and lower extremities, also improving.    8/30: Seen this morning. As of 11:30 AM he had already made 1 L of urine. Ongoing cramping in left upper and lower extremities, mostly in his left thigh. No dyspnea at rest on room air. Swelling much improved. ROS otherwise unremarkable    8/31: Seen this morning. Ongoing fatigue and malaise. Not sleeping very well at night. Bilateral lower extremity cramping. No dyspnea at rest.  No chest pain. Good appetite. Continued good UO.   ROS otherwise unremarkable    PROBLEM LIST:    Principal Problem:    Hyperkalemia  Active Problems:    Acute kidney injury ([address]    Fall    IV infiltrate, initial encounter    Leukocytosis    Traumatic rhabdomyolysis (HCC)    Elevated LFTs  Resolved Problems:    * No resolved hospital problems. *         DIET:    ADULT DIET;  Regular  ADULT ORAL NUTRITION SUPPLEMENT; Lunch, Dinner; Standard High Calorie/High Protein Oral Supplement     MEDS (scheduled):    ceFAZolin  2,000 mg IntraVENous Once    bumetanide  2 mg Oral BID    carvedilol  12.5 mg Oral BID    amLODIPine  5 mg Oral Daily    sennosides-docusate sodium  2 tablet Oral BID    sodium chloride flush  5-40 mL IntraVENous 2 times per day    sodium chloride flush  5-40 mL IntraVENous 2 times per day       MEDS (infusions):   sodium chloride      dextrose      sodium chloride         MEDS (prn):  oxyCODONE-acetaminophen, labetalol, heparin (porcine), sodium chloride flush, sodium chloride, morphine, heparin flush, glucose, dextrose bolus **OR** dextrose bolus, glucagon (rDNA), dextrose, sodium chloride flush, sodium chloride, polyethylene glycol    PHYSICAL EXAM:     Patient Vitals for the past 24 hrs:   BP Temp Temp src Pulse Resp SpO2   08/31/22 1915 (!) 158/107 98.8 °F (37.1 °C) Oral (!) 105 18 100 %   08/31/22 1837 -- -- -- -- 18 --   08/31/22 1439 (!) 161/88 98.5 °F (36.9 °C) Oral (!) 103 18 98 %   08/31/22 1437 -- -- -- -- 18 --   08/31/22 0942 -- -- -- -- 18 --   08/31/22 0912 -- -- -- (!) 117 -- --   08/31/22 0850 -- -- -- -- 18 --   08/31/22 0820 (!) 148/89 98.2 °F (36.8 °C) Oral (!) 117 18 --   08/31/22 0100 (!) 167/103 98.3 °F (36.8 °C) Oral (!) 103 20 95 %   @      Intake/Output Summary (Last 24 hours) at 8/31/2022 2332  Last data filed at 8/31/2022 1933  Gross per 24 hour   Intake --   Output 6525 ml   Net -6525 ml         Wt Readings from Last 3 Encounters:   04/30/18 145 lb (65.8 kg)   12/14/17 155 lb (70.3 kg)   09/14/17 165 lb (74.8 kg)       Constitutional:  in no acute tomorrow, will remove temporary dialysis catheter  Discussed case, okay for discharge tomorrow  If renal functions worsens, plan for tunneled dialysis catheter on Friday  Continue Bumex to 2 mg twice daily for now  Daily bmp  ck         Electronically signed by Theresa Thomas MD on 8/31/2022

## 2022-09-01 NOTE — PROGRESS NOTES
Call to floor nurse, Ezequiel Berrios RN. Notified her NPO and ancef orders placed for scheduled IR procedure tomorrow. She says procedure may be cancelled, she will update IR department if it is.

## 2022-09-01 NOTE — CARE COORDINATION
Awaiting renal plan. Temporary HD cath remains. [facility] following- patient will not have LTAC criteria if HD is not needed. [facility] following [phone]- updated- notify when pt is discharging. Discharge plan will be to return home w/ his mother if HD is not required. Using [employer]- order on chart-no DME preference- [facility] DME notified of referral via - notify when pt is discharging for delivery. Pt does not have a PCP so would not be eligible for [facility] on discharge- PCP Pre-service # on discharge instructions. Will follow Steven Cherry RNcase manager    The Plan for Transition of Care is related to the following treatment goals: DME equipment for gait instability    The Patient and/or patient representative Lemuel Kowalski was provided with a choice of provider and agrees   with the discharge plan. [x] Yes [] No    Freedom of choice list was provided with basic dialogue that supports the patient's individualized plan of care/goals, treatment preferences and shares the quality data associated with the providers.  [x] Yes [] No

## 2022-09-02 VITALS
OXYGEN SATURATION: 97 % | RESPIRATION RATE: 18 BRPM | HEART RATE: 106 BPM | DIASTOLIC BLOOD PRESSURE: 103 MMHG | WEIGHT: 199.3 LBS | SYSTOLIC BLOOD PRESSURE: 158 MMHG | BODY MASS INDEX: 28.53 KG/M2 | TEMPERATURE: 97.2 F | HEIGHT: 70 IN

## 2022-09-02 LAB
ANION GAP SERPL CALCULATED.3IONS-SCNC: 13 MMOL/L (ref 7–16)
BUN BLDV-MCNC: 44 MG/DL (ref 6–20)
CALCIUM SERPL-MCNC: 9.3 MG/DL (ref 8.6–10.2)
CHLORIDE BLD-SCNC: 94 MMOL/L (ref 98–107)
CO2: 28 MMOL/L (ref 22–29)
CREAT SERPL-MCNC: 3.5 MG/DL (ref 0.7–1.2)
GFR AFRICAN AMERICAN: 24
GFR NON-AFRICAN AMERICAN: 24 ML/MIN/1.73
GLUCOSE BLD-MCNC: 102 MG/DL (ref 74–99)
MAGNESIUM: 1.7 MG/DL (ref 1.6–2.6)
PHOSPHORUS: 5 MG/DL (ref 2.5–4.5)
POTASSIUM SERPL-SCNC: 4.5 MMOL/L (ref 3.5–5)
SODIUM BLD-SCNC: 135 MMOL/L (ref 132–146)
TOTAL CK: 505 U/L (ref 20–200)

## 2022-09-02 PROCEDURE — 99232 SBSQ HOSP IP/OBS MODERATE 35: CPT | Performed by: STUDENT IN AN ORGANIZED HEALTH CARE EDUCATION/TRAINING PROGRAM

## 2022-09-02 PROCEDURE — 83735 ASSAY OF MAGNESIUM: CPT

## 2022-09-02 PROCEDURE — 84100 ASSAY OF PHOSPHORUS: CPT

## 2022-09-02 PROCEDURE — 80048 BASIC METABOLIC PNL TOTAL CA: CPT

## 2022-09-02 PROCEDURE — 36415 COLL VENOUS BLD VENIPUNCTURE: CPT

## 2022-09-02 PROCEDURE — 82550 ASSAY OF CK (CPK): CPT

## 2022-09-02 PROCEDURE — 6370000000 HC RX 637 (ALT 250 FOR IP): Performed by: INTERNAL MEDICINE

## 2022-09-02 RX ORDER — AMLODIPINE BESYLATE 5 MG/1
5 TABLET ORAL DAILY
Qty: 30 TABLET | Refills: 3 | Status: SHIPPED | OUTPATIENT
Start: 2022-09-03

## 2022-09-02 RX ORDER — CARVEDILOL 12.5 MG/1
12.5 TABLET ORAL 2 TIMES DAILY
Qty: 60 TABLET | Refills: 3 | Status: SHIPPED | OUTPATIENT
Start: 2022-09-02

## 2022-09-02 RX ORDER — OXYCODONE HYDROCHLORIDE AND ACETAMINOPHEN 5; 325 MG/1; MG/1
1 TABLET ORAL EVERY 6 HOURS PRN
Qty: 12 TABLET | Refills: 0 | Status: SHIPPED | OUTPATIENT
Start: 2022-09-02 | End: 2022-09-07

## 2022-09-02 RX ORDER — OXYCODONE HYDROCHLORIDE AND ACETAMINOPHEN 5; 325 MG/1; MG/1
1 TABLET ORAL EVERY 6 HOURS PRN
Qty: 28 TABLET | Refills: 0 | Status: SHIPPED | OUTPATIENT
Start: 2022-09-02 | End: 2022-09-02 | Stop reason: HOSPADM

## 2022-09-02 RX ADMIN — CARVEDILOL 12.5 MG: 6.25 TABLET, FILM COATED ORAL at 08:58

## 2022-09-02 RX ADMIN — OXYCODONE AND ACETAMINOPHEN 1 TABLET: 5; 325 TABLET ORAL at 03:24

## 2022-09-02 RX ADMIN — OXYCODONE AND ACETAMINOPHEN 1 TABLET: 5; 325 TABLET ORAL at 08:57

## 2022-09-02 RX ADMIN — AMLODIPINE BESYLATE 5 MG: 5 TABLET ORAL at 08:58

## 2022-09-02 ASSESSMENT — PAIN SCALES - GENERAL
PAINLEVEL_OUTOF10: 2
PAINLEVEL_OUTOF10: 10
PAINLEVEL_OUTOF10: 10
PAINLEVEL_OUTOF10: 8

## 2022-09-02 ASSESSMENT — PAIN DESCRIPTION - ORIENTATION
ORIENTATION: LEFT

## 2022-09-02 ASSESSMENT — PAIN - FUNCTIONAL ASSESSMENT
PAIN_FUNCTIONAL_ASSESSMENT: ACTIVITIES ARE NOT PREVENTED
PAIN_FUNCTIONAL_ASSESSMENT: ACTIVITIES ARE NOT PREVENTED

## 2022-09-02 ASSESSMENT — PAIN DESCRIPTION - LOCATION
LOCATION: LEG

## 2022-09-02 ASSESSMENT — PAIN DESCRIPTION - DESCRIPTORS
DESCRIPTORS: ACHING
DESCRIPTORS: ACHING;CRAMPING

## 2022-09-02 NOTE — DISCHARGE SUMMARY
[facility] Physician Discharge Summary       [facility]    Call  Call [phone] to establish a PCP in your community. please inform them you were just discharged from the hospital and need a follow up appointment    Jorge L Mark MD  [address] [place] [address] [phone]    Follow up in 1 week(s)  for hospital follow up      Activity level: As tolerated     Dispo: Home    Condition on discharge: Stable     Patient ID:  Migel Mckeon  [id number]  41 y.o.  1985    Admit date: 8/17/2022    Discharge date and time:  9/2/2022  12:06 PM    Admission Diagnoses: Principal Problem:    Hyperkalemia  Active Problems:    Acute kidney injury ([address]    Fall    IV infiltrate, initial encounter    Leukocytosis    Traumatic rhabdomyolysis ([address]    Elevated LFTs  Resolved Problems:    * No resolved hospital problems. *      Discharge Diagnoses: Principal Problem:    Hyperkalemia  Active Problems:    Acute kidney injury ([address]    Fall    IV infiltrate, initial encounter    Leukocytosis    Traumatic rhabdomyolysis (HCC)    Elevated LFTs  Resolved Problems:    * No resolved hospital problems. *      Consults:  IP CONSULT TO NEPHROLOGY  IP CONSULT TO CRITICAL CARE  IP CONSULT TO GI  IP CONSULT TO IV TEAM  IP CONSULT TO SOCIAL WORK  IP CONSULT TO ORTHOPEDIC SURGERY  IP CONSULT TO ORTHOPEDIC SURGERY  IP CONSULT TO VASCULAR SURGERY  IP CONSULT TO IV TEAM  IP CONSULT TO ORTHOPEDIC SURGERY  IP CONSULT TO IV TEAM  IP CONSULT TO VASCULAR SURGERY  IP CONSULT TO INFECTIOUS DISEASES  IP CONSULT  [facility] [address] [facility]:   Patient Migel Mckeon is a 42 y.o. presented with Hyperkalemia [E87.5]  Shock liver [K72.00]  Acute kidney injury ([address] [N17.9]  Fall, initial encounter [W19. XXXA]  Traumatic rhabdomyolysis, initial encounter ([address] Krystle Perking. 6XXA]  Leukocytosis, unspecified type [D72.829]    Mr. Brock Roldan presented to [facility] on ONE XRAY VIEW OF THE PELVIS 8/17/2022 10:21 am COMPARISON: None. HISTORY: ORDERING SYSTEM PROVIDED HISTORY: fall TECHNOLOGIST PROVIDED HISTORY: Reason for exam:->fall FINDINGS: No evidence of pelvic fracture. Bilateral hips demonstrate normal alignment. No focal osseous lesion. SI joints are symmetric. No acute abnormality of the pelvis. XR HUMERUS LEFT (MIN 2 VIEWS)    Result Date: 8/17/2022  EXAMINATION: TWO XRAY VIEWS OF THE LEFT HUMERUS 8/17/2022 7:58 pm COMPARISON: None. HISTORY: ORDERING SYSTEM PROVIDED HISTORY: Pain TECHNOLOGIST PROVIDED HISTORY: Reason for exam:->Pain FINDINGS: Alignment: No traumatic malalignment. Osseous: Intact without a definite acute fracture identified. Joints: No significant arthritic disease is present. Other: There is prominent appearing soft tissue swelling and apparently linear air anteriorly, advise clinical correlation. .     No acute fracture is identified. Prominent soft tissue swelling an apparent air tracking in the antecubital region. RECOMMENDATION: Clinical correlation. XR ELBOW LEFT (MIN 3 VIEWS)    Result Date: 8/17/2022  EXAMINATION: THREE X-RAY VIEWS OF THE LEFT ELBOW 8/17/2022 7:58 pm COMPARISON: None HISTORY: ORDERING SYSTEM PROVIDED HISTORY: Pain TECHNOLOGIST PROVIDED HISTORY: Reason for Exam:  Pain FINDINGS: Alignment: No traumatic malalignment. Osseous: Intact without a definite acute fracture identified. Joints: No significant arthritic disease is present. Other: There is prominent appearing soft tissue swelling and apparently linear air anteriorly, advise clinical correlation. No acute fracture is identified. Prominent soft tissue swelling and apparent air tracking. RECOMMENDATION: Clinical correlation. XR RADIUS ULNA LEFT (2 VIEWS)    Result Date: 8/17/2022  EXAMINATION: TWO XRAY VIEWS OF THE LEFT FOREARM 8/17/2022 7:58 pm COMPARISON: None.  HISTORY: ORDERING SYSTEM PROVIDED HISTORY: Pain TECHNOLOGIST PROVIDED HISTORY: Reason for exam:->Pain FINDINGS: Alignment: No traumatic malalignment. Osseous: Intact without a definite acute fracture identified. Joints: No significant arthritic disease is present. Other: There is prominent appearing soft tissue swelling and apparently linear air anteriorly, advise clinical correlation for relating to procedure versus complication. .. No acute fracture is identified. Prominent soft tissue swelling an apparent air tracking. Results were discussed with the ordering physician. RECOMMENDATION: Clinical correlation. XR FEMUR LEFT (MIN 2 VIEWS)    Result Date: 8/18/2022  EXAMINATION: 4 XRAY VIEWS OF THE LEFT FEMUR 8/18/2022 1:07 pm COMPARISON: None. HISTORY: ORDERING SYSTEM PROVIDED HISTORY: left thigh pain TECHNOLOGIST PROVIDED HISTORY: Reason for exam:->left thigh pain FINDINGS: Four views of the left femur demonstrate normal alignment without evidence of fracture or subluxation. The soft tissues and osseous structures appear unremarkable. There is no evidence of osteoarthritic change. Unremarkable left femur without evidence of fracture or subluxation. No evidence of radiopaque foreign bodies. CT Head WO Contrast    Result Date: 8/17/2022  EXAMINATION: CT OF THE HEAD WITHOUT CONTRAST  8/17/2022 10:34 am TECHNIQUE: CT of the head was performed without the administration of intravenous contrast. Automated exposure control, iterative reconstruction, and/or weight based adjustment of the mA/kV was utilized to reduce the radiation dose to as low as reasonably achievable. COMPARISON: None. HISTORY: ORDERING SYSTEM PROVIDED HISTORY: fall TECHNOLOGIST PROVIDED HISTORY: Reason for exam:->fall Has a \"code stroke\" or \"stroke alert\" been called? ->No Decision Support Exception - unselect if not a suspected or confirmed emergency medical condition->Emergency Medical Condition (MA) FINDINGS: BRAIN/VENTRICLES: There is no acute intracranial hemorrhage, mass effect or midline shift.   No abnormal extra-axial fluid collection. The gray-white differentiation is maintained without evidence of an acute infarct. There is no evidence of hydrocephalus. ORBITS: The visualized portion of the orbits demonstrate no acute abnormality. SINUSES: The visualized paranasal sinuses and mastoid air cells demonstrate no acute abnormality. SOFT TISSUES/SKULL:  No acute abnormality of the visualized skull or soft tissues. No acute intracranial abnormality. CT Cervical Spine WO Contrast    Result Date: 8/17/2022  EXAMINATION: CT OF THE CERVICAL SPINE WITHOUT CONTRAST 8/17/2022 10:34 am TECHNIQUE: CT of the cervical spine was performed without the administration of intravenous contrast. Multiplanar reformatted images are provided for review. Automated exposure control, iterative reconstruction, and/or weight based adjustment of the mA/kV was utilized to reduce the radiation dose to as low as reasonably achievable. COMPARISON: None. HISTORY: ORDERING SYSTEM PROVIDED HISTORY: fall TECHNOLOGIST PROVIDED HISTORY: Reason for exam:->fall Decision Support Exception - unselect if not a suspected or confirmed emergency medical condition->Emergency Medical Condition (MA) FINDINGS: BONES/ALIGNMENT: There is no acute fracture or traumatic malalignment. There is straightening of lordotic curvature likely due to muscular spasm and/or positioning of the neck. DEGENERATIVE CHANGES: No significant degenerative changes. SOFT TISSUES: There is no prevertebral soft tissue swelling. Lung apices: Small right apical bullae. No acute abnormality of the cervical spine. XR SHOULDER LEFT (MIN 2 VIEWS)    Result Date: 8/17/2022  EXAMINATION: THREE XRAY VIEWS OF THE LEFT SHOULDER 8/17/2022 10:21 am COMPARISON: None. HISTORY: ORDERING SYSTEM PROVIDED HISTORY: fall TECHNOLOGIST PROVIDED HISTORY: Reason for exam:->fall FINDINGS: Glenohumeral joint is normally aligned. No evidence of acute fracture or dislocation. No abnormal periarticular calcifications. The [facility] joint is unremarkable in appearance. Visualized lung is unremarkable. No acute abnormality. US GALLBLADDER RUQ    Result Date: 8/17/2022  EXAMINATION: RIGHT UPPER QUADRANT ULTRASOUND 8/17/2022 5:35 pm COMPARISON: None. HISTORY: ORDERING SYSTEM PROVIDED HISTORY: shock liver TECHNOLOGIST PROVIDED HISTORY: Reason for exam:->shock liver What reading provider will be dictating this exam?->CRC FINDINGS: LIVER:  The liver demonstrates normal echogenicity without evidence of intrahepatic biliary ductal dilatation. BILIARY SYSTEM:  Gallbladder is unremarkable without evidence of pericholecystic fluid, wall thickening or stones. Negative sonographic Davies's sign. Common bile duct is within normal limits measuring . PANCREAS:  Visualized portions of the pancreas are unremarkable. OTHER: No evidence of right upper quadrant ascites. Unremarkable right upper quadrant ultrasound. US DUP UPPER EXTREMITY LEFT VENOUS    Result Date: 8/18/2022  EXAMINATION: VENOUS ULTRASOUND OF THE LEFT UPPER EXTREMITY, 8/18/2022 10:45 am TECHNIQUE: Duplex ultrasound using B-mode/gray scaled imaging and Doppler spectral analysis and color flow was obtained of the deep venous structures of the left upper extremity. COMPARISON: None. HISTORY: ORDERING SYSTEM PROVIDED HISTORY: Discomfort TECHNOLOGIST PROVIDED HISTORY: Reason for exam:->Discomfort What reading provider will be dictating this exam?->CRC FINDINGS: There is normal flow and compressibility of the visualized venous structures. There is no evidence of echogenic thrombus. The veins demonstrate good compressibility with normal color flow study and spectral analysis. No evidence of DVT.  RECOMMENDATIONS: Unavailable     US RETROPERITONEAL COMPLETE    Result Date: 8/17/2022  EXAMINATION: RETROPERITONEAL ULTRASOUND OF THE KIDNEYS AND URINARY BLADDER 8/17/2022 COMPARISON: None HISTORY: ORDERING SYSTEM PROVIDED HISTORY: ryne, r/o hydronephrosis TECHNOLOGIST PROVIDED

## 2022-09-02 NOTE — PROGRESS NOTES
[facility] MD Nani Ng MD Terisa Hercules, MD Storm Lav, CNP   Linda Anguiano, ANP  Progress Note    SUBJECTIVE:   8/18: Complaining of pain in his left upper and lower extremities. Tingling in both extremities. Swelling, though in the left upper extremity due to infiltrated IV. (-) sob on room air. No cp/palp Appetite poor    8/20 seen in her room on RA , alert oriented cooperative stable bp .     8/21 low UO swelling noted     8/22 getting temp IJ HD line then plan daily HD for solute and volume management     8.23 seen on HD tolerating treatment still with scrotal edema and Upper LUE edema . No sensation issues . Ortho note reviewed     8/24 HD today with no reported issues   Still no signs of recovery   Good PO intake    8/25 comfortable on RA , he is telling me he has some UO though not impressive . 8/26 : cr trending at anephric rate . Bp stable on RA , UO marginal      8/27: Ongoing left upper and left lower extremity pain, cramps, aching. Good appetite and intake. HD yesterday without event, 2 L net UF. No SOB on room air.    8/28: cramping. Swelling, though more diffusely. Urine output is picked up    8/29: Seen this morning on dialysis. Tolerating therapy. BP stable. BFR prescribed. Ongoing diffuse muscle cramping, predominantly in the lower extremities bilaterally. Diffuse edema, worse in left upper and lower extremities, also improving.    8/30: Seen this morning. As of 11:30 AM he had already made 1 L of urine. Ongoing cramping in left upper and lower extremities, mostly in his left thigh. No dyspnea at rest on room air. Swelling much improved. ROS otherwise unremarkable    8/31: Seen this morning. Ongoing fatigue and malaise. Not sleeping very well at night. Bilateral lower extremity cramping. No dyspnea at rest.  No chest pain. Good appetite. Continued good UO.   ROS otherwise unremarkable    9/1: Ongoing fatigue malaise generalized weakness. Simply sleeping all day and bedbound. Ongoing diffuse cramps mostly in his lower extremities    9/2: seen and examined, notes that cramping is still present but greatly improved. Resting more comfortably, UO of ~3500cc yesterday. Plan for discharge. PROBLEM LIST:    Principal Problem:    Hyperkalemia  Active Problems:    Acute kidney injury ([address]    Fall    IV infiltrate, initial encounter    Leukocytosis    Traumatic rhabdomyolysis (HCC)    Elevated LFTs  Resolved Problems:    * No resolved hospital problems. *         DIET:    ADULT DIET;  Regular  ADULT ORAL NUTRITION SUPPLEMENT; Lunch, Dinner; Standard High Calorie/High Protein Oral Supplement     MEDS (scheduled):    ceFAZolin  2,000 mg IntraVENous See Admin Instructions    [Held by provider] bumetanide  2 mg Oral BID    carvedilol  12.5 mg Oral BID    amLODIPine  5 mg Oral Daily    sennosides-docusate sodium  2 tablet Oral BID    sodium chloride flush  5-40 mL IntraVENous 2 times per day    sodium chloride flush  5-40 mL IntraVENous 2 times per day       MEDS (infusions):   sodium chloride      dextrose      sodium chloride         MEDS (prn):  oxyCODONE-acetaminophen, labetalol, heparin (porcine), sodium chloride flush, sodium chloride, heparin flush, glucose, dextrose bolus **OR** dextrose bolus, glucagon (rDNA), dextrose, sodium chloride flush, sodium chloride, polyethylene glycol    PHYSICAL EXAM:     Patient Vitals for the past 24 hrs:   BP Temp Temp src Pulse Resp SpO2   09/02/22 0845 (!) 158/103 97.2 °F (36.2 °C) Oral (!) 106 18 97 %   09/02/22 0354 -- -- -- -- 18 --   09/02/22 0324 -- -- -- -- 18 --   09/02/22 0130 (!) 151/83 99.4 °F (37.4 °C) Oral 97 18 97 %   09/01/22 2326 -- -- -- -- 18 --   09/01/22 2256 -- -- -- -- 18 --   09/01/22 2000 (!) 152/97 98.4 °F (36.9 °C) Oral 98 18 100 %   09/01/22 1928 -- -- -- -- 18 --   09/01/22 1446 135/75 98.4 °F (36.9 °C) Oral (!) 105 18 99 %   09/01/22 1254 -- -- -- -- 18 -- @      Intake/Output Summary (Last 24 hours) at 9/2/2022 1048  Last data filed at 9/2/2022 0834  Gross per 24 hour   Intake --   Output 2950 ml   Net -2950 ml         Wt Readings from Last 3 Encounters:   04/30/18 145 lb (65.8 kg)   12/14/17 155 lb (70.3 kg)   09/14/17 165 lb (74.8 kg)       Constitutional:  in no acute distress  HEENT: NC/AT, EOMI, sclera and conjunctiva are clear and anicteric, mucus membranes moist  Neck: Trachea midline, no JVD  Cardiovascular: S1, S2 regular rhythm, no murmur,or rub  Respiratory:  No crackles, no wheeze  Gastrointestinal:  Soft, nontender, nondistended, NABS  Ext: no LE edema, feet warm  Skin: dry, no rash  Neuro: awake, alert, interactive. Moves all 4 extremities      DATA:    Recent Labs     08/31/22  0523 09/01/22  0557   WBC 11.1 9.6   HGB 7.3* 8.0*   HCT 22.8* 24.2*   MCV 92.3 91.3    528*     Recent Labs     08/31/22  0523 09/01/22  0557 09/02/22  0403    137 135   K 4.6 4.1 4.5   CL 96* 94* 94*   CO2 29 31* 28   MG 1.7 1.7 1.7   PHOS 4.6* 5.5* 5.0*   BUN 32* 39* 44*   CREATININE 4.6* 4.4* 3.5*       Lab Results   Component Value Date    LABPROT 2.0 (H) 08/17/2022    LABPROT 2.0 08/17/2022       Assessment  1. Acute kidney injury, severe, oliguric secondary to rhabdomyolysis, itself severe    2. Rhabdomyolysis, severe, total CK on presentation 398,000, improving to total CK 8/18 morning 155,080. Awaiting reporting of this morning's true total CK (currently just listed as greater than 22,000). Does not appear to have compartment syndrome in either left upper or lower extremity, though both are quite sore, and he complains of tingling. 3.  Hyperkalemia due to HELGA, and rhabdomyolysis. 4.  Hyperphosphatemia, due to rhabdomyolysis, HELGA    5.   Transaminitis, improving    CK improving  Volume status improving, edema but mild  Temp RIJ HD line 8/22         Urine output has picked up substantially  Creatinine improved since yesterday    Recommendations  Dialysis catheter removed 9/1  UO remaining improved, LE extremity edema improved as well  Okay for discharge today  Hold Bumex  Follow labs, UO        Electronically signed by Zander Vee DO

## 2022-09-02 NOTE — PROGRESS NOTES
Patient up on couch eating breakfast c/o 10/10 leg pain. Prn pain medication given. Patient ambulates with no assistance, he does not have an IV and refuses to have heart monitor on.

## 2022-09-02 NOTE — PROGRESS NOTES
[facility] MD Deepali Mejia, MD Gricelda Sánchez, MD Hannah Lawson, CNP   Linda Anugiano, ANGELO  Progress Note    9/1/2022    SUBJECTIVE:   8/18: Complaining of pain in his left upper and lower extremities. Tingling in both extremities. Swelling, though in the left upper extremity due to infiltrated IV. (-) sob on room air. No cp/palp Appetite poor    8/20 seen in her room on RA , alert oriented cooperative stable bp .     8/21 low UO swelling noted     8/22 getting temp IJ HD line then plan daily HD for solute and volume management     8.23 seen on HD tolerating treatment still with scrotal edema and Upper LUE edema . No sensation issues . Ortho note reviewed     8/24 HD today with no reported issues   Still no signs of recovery   Good PO intake    8/25 comfortable on RA , he is telling me he has some UO though not impressive . 8/26 : cr trending at anephric rate . Bp stable on RA , UO marginal      8/27: Ongoing left upper and left lower extremity pain, cramps, aching. Good appetite and intake. HD yesterday without event, 2 L net UF. No SOB on room air.    8/28: cramping. Swelling, though more diffusely. Urine output is picked up    8/29: Seen this morning on dialysis. Tolerating therapy. BP stable. BFR prescribed. Ongoing diffuse muscle cramping, predominantly in the lower extremities bilaterally. Diffuse edema, worse in left upper and lower extremities, also improving.    8/30: Seen this morning. As of 11:30 AM he had already made 1 L of urine. Ongoing cramping in left upper and lower extremities, mostly in his left thigh. No dyspnea at rest on room air. Swelling much improved. ROS otherwise unremarkable    8/31: Seen this morning. Ongoing fatigue and malaise. Not sleeping very well at night. Bilateral lower extremity cramping. No dyspnea at rest.  No chest pain. Good appetite. Continued good UO.   ROS otherwise unremarkable    9/1: Ongoing fatigue malaise generalized weakness. Simply sleeping all day and bedbound. Ongoing diffuse cramps mostly in his lower extremities    PROBLEM LIST:    Principal Problem:    Hyperkalemia  Active Problems:    Acute kidney injury ([address]    Fall    IV infiltrate, initial encounter    Leukocytosis    Traumatic rhabdomyolysis (HCC)    Elevated LFTs  Resolved Problems:    * No resolved hospital problems.  *         DIET:    Diet NPO Exceptions are: Sips of Water with Meds, Ice Chips     MEDS (scheduled):    ceFAZolin  2,000 mg IntraVENous See Admin Instructions    [Held by provider] bumetanide  2 mg Oral BID    carvedilol  12.5 mg Oral BID    amLODIPine  5 mg Oral Daily    sennosides-docusate sodium  2 tablet Oral BID    sodium chloride flush  5-40 mL IntraVENous 2 times per day    sodium chloride flush  5-40 mL IntraVENous 2 times per day       MEDS (infusions):   sodium chloride      dextrose      sodium chloride         MEDS (prn):  oxyCODONE-acetaminophen, labetalol, heparin (porcine), sodium chloride flush, sodium chloride, heparin flush, glucose, dextrose bolus **OR** dextrose bolus, glucagon (rDNA), dextrose, sodium chloride flush, sodium chloride, polyethylene glycol    PHYSICAL EXAM:     Patient Vitals for the past 24 hrs:   BP Temp Temp src Pulse Resp SpO2   09/01/22 2326 -- -- -- -- 18 --   09/01/22 2256 -- -- -- -- 18 --   09/01/22 2000 (!) 152/97 98.4 °F (36.9 °C) Oral 98 18 100 %   09/01/22 1928 -- -- -- -- 18 --   09/01/22 1446 135/75 98.4 °F (36.9 °C) Oral (!) 105 18 99 %   09/01/22 1254 -- -- -- -- 18 --   09/01/22 0945 (!) 145/90 98.7 °F (37.1 °C) Oral 94 18 100 %   @      Intake/Output Summary (Last 24 hours) at 9/2/2022 0025  Last data filed at 9/1/2022 1903  Gross per 24 hour   Intake --   Output 3475 ml   Net -3475 ml         Wt Readings from Last 3 Encounters:   04/30/18 145 lb (65.8 kg)   12/14/17 155 lb (70.3 kg)   09/14/17 165 lb (74.8 kg)       Constitutional:  in no acute distress  HEENT: NC/AT, EOMI, sclera and conjunctiva are clear and anicteric, mucus membranes moist  Neck: Trachea midline, no JVD  Cardiovascular: S1, S2 regular rhythm, no murmur,or rub  Respiratory:  No crackles, no wheeze  Gastrointestinal:  Soft, nontender, nondistended, NABS  Ext: Mild lower extremity edema, feet warm  Skin: dry, no rash  Neuro: awake, alert, interactive. Moves all 4 extremities      DATA:    Recent Labs     08/30/22 0235 08/31/22 0523 09/01/22  0557   WBC 11.8* 11.1 9.6   HGB 7.0* 7.3* 8.0*   HCT 21.4* 22.8* 24.2*   MCV 93.4 92.3 91.3    386 528*     Recent Labs     08/30/22 0235 08/31/22 0523 09/01/22  0557     137 134 137   K 4.5  4.6 4.6 4.1     101 96* 94*   CO2 26  27 29 31*   MG 1.8 1.7 1.7   PHOS 4.2 4.6* 5.5*   BUN 24*  24* 32* 39*   CREATININE 4.4*  4.4* 4.6* 4.4*   ALT 97*  --   --    AST 45*  --   --    BILITOT 0.4  --   --    ALKPHOS 51  --   --        Lab Results   Component Value Date    LABPROT 2.0 (H) 08/17/2022    LABPROT 2.0 08/17/2022       Assessment  1. Acute kidney injury, severe, oliguric secondary to rhabdomyolysis, itself severe    2. Rhabdomyolysis, severe, total CK on presentation 398,000, improving to total CK 8/18 morning 155,080. Awaiting reporting of this morning's true total CK (currently just listed as greater than 22,000). Does not appear to have compartment syndrome in either left upper or lower extremity, though both are quite sore, and he complains of tingling. 3.  Hyperkalemia due to HELGA, and rhabdomyolysis. 4.  Hyperphosphatemia, due to rhabdomyolysis, HELGA    5.   Transaminitis, improving    CK improving  Volume status improving, edema but mild  Temp RIJ HD line 8/22     Urine output has picked up substantially  Creatinine improved since yesterday    Recommendations  Hold dialysis again today  Remove dialysis catheter  Barring new development okay for discharge tomorrow  Hold Bumex  Follow labs, UO        Electronically signed by Mikhail Schwartz [place] MD

## 2022-09-02 NOTE — CARE COORDINATION
Temp HD cath removed yesterday. Plan home w/ mother. [facility] DME notified via VM to deliver to pt's room. Does not have a PCP-PCP Pre-service # on discharge instructions-pt is not eligible for HHC. No other needs Hortensia Dee, RNcase manager    [id number]: Discharge to home w/ his mother today. LETHA HERNANDEZ [facility] [phone] notified that dialysis is not needed at this time. Patient refusing [facility] notified to cancel referral. Pt is requesting crutches- nursing to obtain from ER. Does not have a PCP-PCP Pre-service # on discharge instructions-pt is not eligible for HHC.  No other needs Hortensia Dee RNcase manager

## 2022-09-06 NOTE — PROGRESS NOTES
CLINICAL PHARMACY NOTE: MEDS TO BEDS    Total # of Prescriptions Filled: 2   The following medications were delivered to the patient:  Amlodipine 5  Carvedill 12.5    Additional Documentation:

## 2022-09-12 ENCOUNTER — OFFICE VISIT (OUTPATIENT)
Dept: FAMILY MEDICINE CLINIC | Age: 37
End: 2022-09-12
Payer: MEDICAID

## 2022-09-12 VITALS
RESPIRATION RATE: 16 BRPM | DIASTOLIC BLOOD PRESSURE: 70 MMHG | WEIGHT: 150 LBS | TEMPERATURE: 97.9 F | OXYGEN SATURATION: 98 % | HEIGHT: 69 IN | SYSTOLIC BLOOD PRESSURE: 124 MMHG | BODY MASS INDEX: 22.22 KG/M2 | HEART RATE: 115 BPM

## 2022-09-12 DIAGNOSIS — T79.6XXS TRAUMATIC RHABDOMYOLYSIS, SEQUELA: ICD-10-CM

## 2022-09-12 DIAGNOSIS — M79.605 LOWER EXTREMITY PAIN, POSTERIOR, LEFT: Primary | ICD-10-CM

## 2022-09-12 DIAGNOSIS — N17.9 ACUTE RENAL FAILURE, UNSPECIFIED ACUTE RENAL FAILURE TYPE (HCC): ICD-10-CM

## 2022-09-12 PROCEDURE — 99214 OFFICE O/P EST MOD 30 MIN: CPT

## 2022-09-12 RX ORDER — DULOXETIN HYDROCHLORIDE 30 MG/1
30 CAPSULE, DELAYED RELEASE ORAL DAILY
Qty: 30 CAPSULE | Refills: 0 | Status: SHIPPED | OUTPATIENT
Start: 2022-09-12

## 2022-09-12 RX ORDER — CYCLOBENZAPRINE HCL 10 MG
10 TABLET ORAL 3 TIMES DAILY PRN
Qty: 30 TABLET | Refills: 0 | Status: SHIPPED | OUTPATIENT
Start: 2022-09-12 | End: 2022-09-22

## 2022-09-12 SDOH — ECONOMIC STABILITY: FOOD INSECURITY: WITHIN THE PAST 12 MONTHS, THE FOOD YOU BOUGHT JUST DIDN'T LAST AND YOU DIDN'T HAVE MONEY TO GET MORE.: NEVER TRUE

## 2022-09-12 SDOH — ECONOMIC STABILITY: FOOD INSECURITY: WITHIN THE PAST 12 MONTHS, YOU WORRIED THAT YOUR FOOD WOULD RUN OUT BEFORE YOU GOT MONEY TO BUY MORE.: NEVER TRUE

## 2022-09-12 ASSESSMENT — PATIENT HEALTH QUESTIONNAIRE - PHQ9
SUM OF ALL RESPONSES TO PHQ QUESTIONS 1-9: 0
SUM OF ALL RESPONSES TO PHQ QUESTIONS 1-9: 0
2. FEELING DOWN, DEPRESSED OR HOPELESS: 0
1. LITTLE INTEREST OR PLEASURE IN DOING THINGS: 0
SUM OF ALL RESPONSES TO PHQ QUESTIONS 1-9: 0
SUM OF ALL RESPONSES TO PHQ9 QUESTIONS 1 & 2: 0
SUM OF ALL RESPONSES TO PHQ QUESTIONS 1-9: 0

## 2022-09-12 ASSESSMENT — ENCOUNTER SYMPTOMS
EYE PAIN: 0
EYE REDNESS: 0

## 2022-09-12 ASSESSMENT — SOCIAL DETERMINANTS OF HEALTH (SDOH): HOW HARD IS IT FOR YOU TO PAY FOR THE VERY BASICS LIKE FOOD, HOUSING, MEDICAL CARE, AND HEATING?: NOT HARD AT ALL
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
## 2022-09-16 ASSESSMENT — ENCOUNTER SYMPTOMS
NAUSEA: 0
DIARRHEA: 0
CHEST TIGHTNESS: 0
BLOOD IN STOOL: 0
SHORTNESS OF BREATH: 0
VOMITING: 0
CONSTIPATION: 0

## 2022-09-21 ENCOUNTER — TELEPHONE (OUTPATIENT)
Dept: FAMILY MEDICINE CLINIC | Age: 37
End: 2022-09-21

## 2022-09-21 NOTE — TELEPHONE ENCOUNTER
At physical therapy having a lot of pain in the left foot wanted [email or web address] know if you can order an xray to be done?